# Patient Record
Sex: FEMALE | Race: WHITE | NOT HISPANIC OR LATINO | Employment: OTHER | URBAN - METROPOLITAN AREA
[De-identification: names, ages, dates, MRNs, and addresses within clinical notes are randomized per-mention and may not be internally consistent; named-entity substitution may affect disease eponyms.]

---

## 2023-10-05 ENCOUNTER — TELEPHONE (OUTPATIENT)
Dept: PRIMARY CARE | Facility: CLINIC | Age: 68
End: 2023-10-05

## 2023-10-05 NOTE — TELEPHONE ENCOUNTER
[facility] called in regards to the cert of medical necessity. States the form is missing the patient's height and weight. Form needs to be updated and refaxed. Callback number is [phone].

## 2024-06-24 ENCOUNTER — TELEPHONE (OUTPATIENT)
Dept: PRIMARY CARE | Facility: CLINIC | Age: 69
End: 2024-06-24

## 2024-06-24 NOTE — TELEPHONE ENCOUNTER
Germán is calling with [facility] and would like to know if Dr. Alvarez would be ok with them seeing this pt in between office visits with her, please advise

## 2024-07-10 ENCOUNTER — TELEPHONE (OUTPATIENT)
Dept: PRIMARY CARE | Facility: CLINIC | Age: 69
End: 2024-07-10
Payer: MEDICARE

## 2024-07-10 NOTE — TELEPHONE ENCOUNTER
Pt is calling and would like to know why Dr. Alvarez wants her to have home care, pt states that she is in rehab right now and does not have her cell phone, pt also says that it is ok to call her sister Yarelis at [phone] to explain why she needs home care, please advise

## 2024-07-10 NOTE — TELEPHONE ENCOUNTER
Call placed to patient's sister, advised PCP has not ordered homecare and that this may have been recommended from the patient's hospital admission. Pt's sister scheduled follow up for Tuesday with PCP to discuss patient being placed in assisted living.

## 2024-07-12 NOTE — PROGRESS NOTES
This is a 68 year old female for REHAB FU visit and FOR PLACEMENT in SNF evaluation:  Was at [facility] then F SOCIAL WORK sent to [facility] from [facility] to [facility] and now they are RECOMMENDING SNF    Her middle sister Zulema not here today but resides in [place] and the family wishes the PLACEMENT to be on [facility].    She also has VOCAL CORD issues likely smoking related but did not report to Dr Bradford for FU    ALSO REPORTS NEW ONSET DIARRHEA JUNE 28th which is NOT WATERY but loose NO BRBPR or MELENA        SUMMARY BELOW:      ASSESSMENT AND PLAN:   Acute resp failure hypercapneic and hypoxic   COPD exacerbation  - home regimen: Dulera, Incruse, Albuterol  HX  etoh abuse  Lung infiltrate  Severe protein calorie malnutrition     Plan  - Supplemental oxygen as needed, wean as tolerated  - continue BiPAP nightly and as needed  - continue steroids - decreased IV Solumedrol to 40 mg q 12 hrs  - continue empiric antibiotics - azithromycin day 5/5 and ceftriaxone day 5/5, last day today 6/26  - Bronchodilators -Duoneb scheduled and as needed- continue Dulera and Spiriva (for Incruse)  - mucinex and acapella  - add humidification to oxygen, ocean nasal spray to help with nasal congestion  - smoking cessation strongly encouraged, patient reports she not going to smoke anymore  - follow up outpatient with Dr. Martinez - currently scheduled 8/9/2024  - consult to smoking cessation, message sent to arrange  - encourage mobility, out of bed to chair, ambulate with therapy as able  - PT/OT recommending SNF and CM working on placement    ADMITTED TO [facility]:    Visit Diagnoses  SOB (shortness of breath)    Acute respiratory failure with hypoxia and hypercapnia (HCC)    History of COPD    Tachycardia    Tobacco use disorder      CURRENT HOSPITAL COURSE   Patient is 69 yo female who was admitted on 6/22/24 due to SOB. Patient was initially placed on BiPAP secondary acute hypercapnic and hypoxemia respiratory failure  "due to COPD exac and lung infiltrate. Labs show severe protein calorie malnutrition. Patient endorses decreased PO intake at home.     Relevant Past Medical History: COPD, smoker, lung nodule, h/o ETOH abuse, s/p L femur ORIF     HOME LIVING  Patient Lives With: Self/Alone  Assistance Available: PRN (family live nearby)  Entry To Home: No Stairs  Number Of Stairs To Bed/Bath: 0  Tub/Shower Type: tub/shower combo  Laundry: first floor  Equipment Owned: Cane, Walker- Wheeled         DISCHARGE RECOMMENDATIONS  Subacute/SNF  Recommended Discharge Disposition Comments: Patient is not at her usual functional baseline and would benefit from continued skilled PT to improve activity tolerance in standing/ambulation to allow patient to perform ADLs and household IADLs  Recommended Discharge Disposition Due to: Functional deficits requiring ongoing therapy service prior to discharge home., Anticipated community discharge, Functional status decline  Recommended Discharge Equipment: To Be Determined     ASSESSMENT  Response to Therapy Interventions: Requires Additional Time to Complete Activities, Limited Participation, Low Activity Tolerance      ROS is NEG for HEADACHE, NAUSEA, VOMITING, DIARRHEA, CHEST PAIN, SOB, and BLEEDING and as further REVIEWED BELOW.      Subjective   Ingris Mckinney is a 68 y.o. female who presents for discuss referrals and Foot Swelling (\"Purple coloring in feet since July12th\" also some \"new red veins all over body\" ).    HPI:    Per nursing intake, pt here for discuss referrals and Foot Swelling (\"Purple coloring in feet since July12th\" also some \"new red veins all over body\" )       Review of systems is essentially negative for all systems except for any identified issues in HPI above.    Objective     /60   Pulse (!) 112   Temp 36.4 °C (97.6 °F)   Wt (!) 41.3 kg (91 lb)   SpO2 96% Comment: RA  BMI 15.87 kg/m²      Physical Examination:       GENERAL           General Appearance: " well-appearing, well-developed, well-hydrated, well-nourished, no acute distress.        HEENT           NECK supple, no masses or thyromegaly, no carotid bruit.        EYES           Extraocular Movements: normal, bilateral eyes PALMA, no conjunctival injection.        HEART           Rate and Rhythm regular rate and rhythm. Heart sounds: normal S1S2, no S3 or S4. Murmurs: none.        CHEST           Breath sounds: Clear to IPPA, RR<16 no use of accessory muscles.        ABDOMEN           General: Neg for LKKS or masses, no scleral icterus or jaundice.        MUSCULOSKELETAL           Joints Demonstration: Neg for erythema, swelling or joint deformities. gross abnormalities no gross abnormalities.        EXTREMITIES           Lower Extremities: Neg for cyanosis, clubbing or edema.       Assessment/Plan   DUE TO MULTIPLE COMPLEX CONDITIONS including ACUTE ON CHRONIC RESP FAILURE / COPD/ Hx ETOH ABUSE / LUNG INFILRATE and severe protein calorie malnutrition pt is recommended to SNF per review of ADMISSION NOTES and DC from REHAB NOTES.    SHE IS INTERESTED IN RELOCATING to a PCP from [facility] on [facility] near [facility].    CASE MGT TEAM TO ASSIST with this and other referrals which she wishes to consolidate to [facility]>      SMOKING CESSATION emphasized to conitnue TOTAL AVOIDANCE of CIGARETTES which she is currently successful at doing    FU with PULM recommended and already entered in DC NOTES and also CT SCAN at [facility] AUG 9th planned and pt is aware and has all notes from REHAB regarding these plans.    PT/OT recommend and CARE MGT team working on PLACEMENT to SNF  Problem List Items Addressed This Visit    None  Visit Diagnoses       SOB (shortness of breath)    -  Primary    Relevant Orders    Referral to Pulmonology    Acute on chronic respiratory failure, unspecified whether with hypoxia or hypercapnia (Multi)        Relevant Orders    Referral to Pulmonology    Hypoxia        Relevant  Orders    Referral to Pulmonology    Hx of hypercapnia        Muscle weakness        Relevant Orders    Referral to Pulmonology    Tachycardia        Relevant Orders    Referral to Pulmonology    Hospital discharge follow-up        Relevant Orders    Referral to Pulmonology    Follow Up In [facility] - Care Manager            FOLLOW UP:  PRN and as specified above         Ngoc Alvarez M.D.

## 2024-07-16 ENCOUNTER — OFFICE VISIT (OUTPATIENT)
Dept: PRIMARY CARE | Facility: CLINIC | Age: 69
End: 2024-07-16
Payer: MEDICARE

## 2024-07-16 VITALS
HEART RATE: 112 BPM | BODY MASS INDEX: 15.87 KG/M2 | DIASTOLIC BLOOD PRESSURE: 60 MMHG | WEIGHT: 91 LBS | OXYGEN SATURATION: 96 % | SYSTOLIC BLOOD PRESSURE: 106 MMHG | TEMPERATURE: 97.6 F

## 2024-07-16 DIAGNOSIS — M62.81 MUSCLE WEAKNESS: ICD-10-CM

## 2024-07-16 DIAGNOSIS — Z87.898: ICD-10-CM

## 2024-07-16 DIAGNOSIS — Z09 HOSPITAL DISCHARGE FOLLOW-UP: ICD-10-CM

## 2024-07-16 DIAGNOSIS — R19.7 DIARRHEA, UNSPECIFIED TYPE: ICD-10-CM

## 2024-07-16 DIAGNOSIS — R23.3 ECCHYMOSES, SPONTANEOUS: ICD-10-CM

## 2024-07-16 DIAGNOSIS — J96.20 ACUTE ON CHRONIC RESPIRATORY FAILURE, UNSPECIFIED WHETHER WITH HYPOXIA OR HYPERCAPNIA (MULTI): ICD-10-CM

## 2024-07-16 DIAGNOSIS — R09.02 HYPOXIA: ICD-10-CM

## 2024-07-16 DIAGNOSIS — Q31.8 VOCAL CORD ANOMALY: ICD-10-CM

## 2024-07-16 DIAGNOSIS — Z86.69 HISTORY OF CATARACT: ICD-10-CM

## 2024-07-16 DIAGNOSIS — R06.02 SOB (SHORTNESS OF BREATH): Primary | ICD-10-CM

## 2024-07-16 DIAGNOSIS — R00.0 TACHYCARDIA: ICD-10-CM

## 2024-07-16 PROCEDURE — 99214 OFFICE O/P EST MOD 30 MIN: CPT | Performed by: FAMILY MEDICINE

## 2024-07-16 PROCEDURE — 1159F MED LIST DOCD IN RCRD: CPT | Performed by: FAMILY MEDICINE

## 2024-07-16 RX ORDER — IBUPROFEN 200 MG
1 TABLET ORAL EVERY 24 HOURS
COMMUNITY

## 2024-07-16 RX ORDER — ACETAMINOPHEN 325 MG/1
1 TABLET ORAL
COMMUNITY

## 2024-07-16 RX ORDER — FUROSEMIDE 20 MG/1
20 TABLET ORAL DAILY
COMMUNITY

## 2024-07-16 RX ORDER — PREDNISONE 10 MG/1
10 TABLET ORAL DAILY
COMMUNITY
Start: 2024-06-28 | End: 2024-07-16 | Stop reason: WASHOUT

## 2024-07-16 RX ORDER — OMEPRAZOLE 20 MG/1
20 CAPSULE, DELAYED RELEASE ORAL
COMMUNITY

## 2024-07-16 RX ORDER — ALBUTEROL SULFATE 90 UG/1
2 AEROSOL, METERED RESPIRATORY (INHALATION) EVERY 4 HOURS PRN
COMMUNITY
Start: 2024-01-08

## 2024-07-16 RX ORDER — SODIUM CHLORIDE FOR INHALATION 7 %
4 VIAL, NEBULIZER (ML) INHALATION 2 TIMES DAILY
COMMUNITY
Start: 2024-06-28

## 2024-07-16 RX ORDER — MOMETASONE FUROATE AND FORMOTEROL FUMARATE DIHYDRATE 200; 5 UG/1; UG/1
2 AEROSOL RESPIRATORY (INHALATION) 2 TIMES DAILY
COMMUNITY
Start: 2024-01-08

## 2024-07-16 RX ORDER — HYDROXYZINE PAMOATE 25 MG/1
25 CAPSULE ORAL 3 TIMES DAILY PRN
COMMUNITY

## 2024-07-16 RX ORDER — PANTOPRAZOLE SODIUM 40 MG/1
40 TABLET, DELAYED RELEASE ORAL
COMMUNITY
Start: 2024-06-29

## 2024-07-16 RX ORDER — GUAIFENESIN 600 MG/1
1200 TABLET, EXTENDED RELEASE ORAL 2 TIMES DAILY
COMMUNITY

## 2024-07-16 RX ORDER — MELATONIN 3 MG
2 CAPSULE ORAL NIGHTLY
COMMUNITY

## 2024-07-16 RX ORDER — FLUTICASONE PROPIONATE 50 MCG
2 SPRAY, SUSPENSION (ML) NASAL
COMMUNITY
Start: 2022-09-27

## 2024-07-16 ASSESSMENT — COLUMBIA-SUICIDE SEVERITY RATING SCALE - C-SSRS

## 2024-07-16 ASSESSMENT — ENCOUNTER SYMPTOMS
OCCASIONAL FEELINGS OF UNSTEADINESS: 1
DEPRESSION: 0
LOSS OF SENSATION IN FEET: 1

## 2024-07-16 ASSESSMENT — PATIENT HEALTH QUESTIONNAIRE - PHQ9
2. FEELING DOWN, DEPRESSED OR HOPELESS: NOT AT ALL
SUM OF ALL RESPONSES TO PHQ9 QUESTIONS 1 AND 2: 0
1. LITTLE INTEREST OR PLEASURE IN DOING THINGS: NOT AT ALL

## 2024-07-17 ENCOUNTER — LAB (OUTPATIENT)
Dept: LAB | Facility: LAB | Age: 69
End: 2024-07-17
Payer: MEDICARE

## 2024-07-17 PROCEDURE — 87493 C DIFF AMPLIFIED PROBE: CPT

## 2024-07-17 PROCEDURE — 87506 IADNA-DNA/RNA PROBE TQ 6-11: CPT

## 2024-07-18 LAB
C COLI+JEJ+UPSA DNA STL QL NAA+PROBE: NOT DETECTED
C DIF TOX TCDA+TCDB STL QL NAA+PROBE: NOT DETECTED
EC STX1 GENE STL QL NAA+PROBE: NOT DETECTED
EC STX2 GENE STL QL NAA+PROBE: NOT DETECTED
NOROVIRUS GI + GII RNA STL NAA+PROBE: NOT DETECTED
RV RNA STL NAA+PROBE: NOT DETECTED
SALMONELLA DNA STL QL NAA+PROBE: NOT DETECTED
SHIGELLA DNA SPEC QL NAA+PROBE: NOT DETECTED
V CHOLERAE DNA STL QL NAA+PROBE: NOT DETECTED
Y ENTEROCOL DNA STL QL NAA+PROBE: NOT DETECTED

## 2024-07-22 ENCOUNTER — TELEPHONE (OUTPATIENT)
Dept: PRIMARY CARE | Facility: CLINIC | Age: 69
End: 2024-07-22
Payer: MEDICARE

## 2024-07-22 NOTE — PROGRESS NOTES
I received referral for Ingris but she does not qualify for CCM as she doesn't have 2 qualifying diagnosis.  Is she currently at AL facility?  If so their SW would be responsible for moving her to desired location and finding new PCP.

## 2024-07-26 ENCOUNTER — TELEPHONE (OUTPATIENT)
Dept: PRIMARY CARE | Facility: CLINIC | Age: 69
End: 2024-07-26
Payer: MEDICARE

## 2024-07-26 NOTE — TELEPHONE ENCOUNTER
Ventura is calling from [facility] and would like to know If Dr. Alvarez would be willing to follow this patient and also sign orders, please advise

## 2024-07-27 ENCOUNTER — NURSING HOME VISIT (OUTPATIENT)
Dept: POST ACUTE CARE | Facility: EXTERNAL LOCATION | Age: 69
End: 2024-07-27
Payer: MEDICARE

## 2024-07-27 DIAGNOSIS — Z91.81 AT RISK FOR FALLING: ICD-10-CM

## 2024-07-27 DIAGNOSIS — J44.9 CHRONIC OBSTRUCTIVE PULMONARY DISEASE, UNSPECIFIED COPD TYPE (MULTI): Primary | ICD-10-CM

## 2024-07-27 DIAGNOSIS — J96.90 RESPIRATORY FAILURE, UNSPECIFIED CHRONICITY, UNSPECIFIED WHETHER WITH HYPOXIA OR HYPERCAPNIA (MULTI): ICD-10-CM

## 2024-07-27 DIAGNOSIS — E46 MALNUTRITION, UNSPECIFIED TYPE (MULTI): ICD-10-CM

## 2024-07-27 DIAGNOSIS — R53.1 WEAKNESS: ICD-10-CM

## 2024-07-27 PROCEDURE — 99308 SBSQ NF CARE LOW MDM 20: CPT | Performed by: INTERNAL MEDICINE

## 2024-07-27 NOTE — LETTER
Patient: Ingris Mckinney  : 1955    Encounter Date: 2024    PLACE OF SERVICE:  [facility]    This is a sick visit.    This is cross-coverage for Dr. Baer.    Subjective  Patient ID: Ingris Mckinney is a 68 y.o. female who presents for Follow-up.    Ms. Ingris Mckinney is a 68-year-old female with history of COPD with respiratory failure.  She is unable to care for herself and requires supportive care.    Review of Systems   Constitutional:  Negative for chills and fever.   Cardiovascular:  Negative for chest pain.   All other systems reviewed and are negative.    Objective  /78   Pulse 80   Temp 36.6 °C (97.9 °F)   Resp 18     Physical Exam  Vitals reviewed.   Constitutional:       General: She is not in acute distress.     Comments: This is a well-developed, well-nourished female, siting in a chair   HENT:      Right Ear: Tympanic membrane, ear canal and external ear normal.      Left Ear: Tympanic membrane, ear canal and external ear normal.   Eyes:      General: No scleral icterus.     Pupils: Pupils are equal, round, and reactive to light.   Neck:      Vascular: No carotid bruit.   Cardiovascular:      Heart sounds: Normal heart sounds, S1 normal and S2 normal. No murmur heard.     No friction rub.   Pulmonary:      Breath sounds: Decreased breath sounds (throughout) present.   Abdominal:      Palpations: There is no hepatomegaly, splenomegaly or mass.   Musculoskeletal:         General: No swelling or deformity. Normal range of motion.      Cervical back: Neck supple.      Right lower leg: No edema.      Left lower leg: No edema.   Lymphadenopathy:      Cervical: No cervical adenopathy.      Upper Body:      Right upper body: No axillary adenopathy.      Left upper body: No axillary adenopathy.      Lower Body: No right inguinal adenopathy. No left inguinal adenopathy.   Neurological:      Mental Status: She is oriented to person, place, and time.      Cranial Nerves: Cranial nerves 2-12  are intact. No cranial nerve deficit.      Sensory: No sensory deficit.      Motor: Motor function is intact. No weakness.      Gait: Gait is intact.      Deep Tendon Reflexes: Reflexes normal.   Psychiatric:         Mood and Affect: Mood normal. Mood is not anxious or depressed. Affect is not angry.         Behavior: Behavior is not agitated.         Thought Content: Thought content normal.         Judgment: Judgment normal.     LAB WORK:  Laboratory studies were reviewed.    Assessment/Plan  Problem List Items Addressed This Visit    None  Visit Diagnoses         Codes    Chronic obstructive pulmonary disease, unspecified COPD type (Multi)    -  Primary J44.9    Respiratory failure, unspecified chronicity, unspecified whether with hypoxia or hypercapnia (Multi)     J96.90    Malnutrition, unspecified type (Multi)     E46    Weakness     R53.1    At risk for falling     Z91.81        1. COPD, on bronchodilator therapy.  2. Respiratory failure.  Use oxygen as needed.  3. Malnutrition.  Encouraged to eat.  4. Weakness, on PT/OT.  5. Fall risk, on fall precautions.    Scribe Attestation  By signing my name below, IKelly Scribe attest that this documentation has been prepared under the direction and in the presence of Josee Fowler MD.     All medical record entries made by the scribe were personally dictated by me I have reviewed the chart and agree the record accurately reflects my personal performance of his history physical examination and management      Electronically Signed By: Josee Fowler MD   8/5/24 11:04 PM

## 2024-07-29 NOTE — TELEPHONE ENCOUNTER
Inés calling asking for an update to see if PCP will follow for home care. Call back number is [phone].

## 2024-08-03 VITALS
SYSTOLIC BLOOD PRESSURE: 126 MMHG | RESPIRATION RATE: 18 BRPM | TEMPERATURE: 97.9 F | HEART RATE: 80 BPM | DIASTOLIC BLOOD PRESSURE: 78 MMHG

## 2024-08-03 ASSESSMENT — ENCOUNTER SYMPTOMS
FEVER: 0
CHILLS: 0

## 2024-08-03 NOTE — PROGRESS NOTES
PLACE OF SERVICE:  [facility]    This is a sick visit.    This is cross-coverage for Dr. Baer.    Subjective   Patient ID: Ingris Mckinney is a 68 y.o. female who presents for Follow-up.    Ms. Ingris Mckinney is a 68-year-old female with history of COPD with respiratory failure.  She is unable to care for herself and requires supportive care.    Review of Systems   Constitutional:  Negative for chills and fever.   Cardiovascular:  Negative for chest pain.   All other systems reviewed and are negative.    Objective   /78   Pulse 80   Temp 36.6 °C (97.9 °F)   Resp 18     Physical Exam  Vitals reviewed.   Constitutional:       General: She is not in acute distress.     Comments: This is a well-developed, well-nourished female, siting in a chair   HENT:      Right Ear: Tympanic membrane, ear canal and external ear normal.      Left Ear: Tympanic membrane, ear canal and external ear normal.   Eyes:      General: No scleral icterus.     Pupils: Pupils are equal, round, and reactive to light.   Neck:      Vascular: No carotid bruit.   Cardiovascular:      Heart sounds: Normal heart sounds, S1 normal and S2 normal. No murmur heard.     No friction rub.   Pulmonary:      Breath sounds: Decreased breath sounds (throughout) present.   Abdominal:      Palpations: There is no hepatomegaly, splenomegaly or mass.   Musculoskeletal:         General: No swelling or deformity. Normal range of motion.      Cervical back: Neck supple.      Right lower leg: No edema.      Left lower leg: No edema.   Lymphadenopathy:      Cervical: No cervical adenopathy.      Upper Body:      Right upper body: No axillary adenopathy.      Left upper body: No axillary adenopathy.      Lower Body: No right inguinal adenopathy. No left inguinal adenopathy.   Neurological:      Mental Status: She is oriented to person, place, and time.      Cranial Nerves: Cranial nerves 2-12 are intact. No cranial nerve deficit.      Sensory: No sensory  deficit.      Motor: Motor function is intact. No weakness.      Gait: Gait is intact.      Deep Tendon Reflexes: Reflexes normal.   Psychiatric:         Mood and Affect: Mood normal. Mood is not anxious or depressed. Affect is not angry.         Behavior: Behavior is not agitated.         Thought Content: Thought content normal.         Judgment: Judgment normal.     LAB WORK:  Laboratory studies were reviewed.    Assessment/Plan   Problem List Items Addressed This Visit    None  Visit Diagnoses         Codes    Chronic obstructive pulmonary disease, unspecified COPD type (Multi)    -  Primary J44.9    Respiratory failure, unspecified chronicity, unspecified whether with hypoxia or hypercapnia (Multi)     J96.90    Malnutrition, unspecified type (Multi)     E46    Weakness     R53.1    At risk for falling     Z91.81        1. COPD, on bronchodilator therapy.  2. Respiratory failure.  Use oxygen as needed.  3. Malnutrition.  Encouraged to eat.  4. Weakness, on PT/OT.  5. Fall risk, on fall precautions.    Scribe Attestation  By signing my name below, IKelly Scribe attest that this documentation has been prepared under the direction and in the presence of Josee Fowler MD.     All medical record entries made by the scribjuliane were personally dictated by me I have reviewed the chart and agree the record accurately reflects my personal performance of his history physical examination and management

## 2024-08-06 ENCOUNTER — TELEPHONE (OUTPATIENT)
Dept: PRIMARY CARE | Facility: CLINIC | Age: 69
End: 2024-08-06
Payer: MEDICARE

## 2024-08-06 NOTE — TELEPHONE ENCOUNTER
Agustina canela nurse  from [facility] stating this pt is applying for a waiver and there will be some orders sent over that will need to be signed due to the pt being admitting in the hospital from 6/22- 6/28 and then a rehab visit after nurse is requesting that these orders be signed and sent back ASAP

## 2024-08-12 ENCOUNTER — TELEPHONE (OUTPATIENT)
Dept: PRIMARY CARE | Facility: CLINIC | Age: 69
End: 2024-08-12
Payer: MEDICARE

## 2024-08-12 NOTE — TELEPHONE ENCOUNTER
"[facility] Physical Therapy left VM stating \"pt resting HR is 107-112 bpm but pt does not seem to be in any cardiac distress or any cardiac symptoms. Any further instructions or any further information you may need just call us.\"   "

## 2024-09-23 ENCOUNTER — OFFICE VISIT (OUTPATIENT)
Dept: PRIMARY CARE | Facility: CLINIC | Age: 69
End: 2024-09-23
Payer: MEDICARE

## 2024-09-23 VITALS
WEIGHT: 93 LBS | TEMPERATURE: 97 F | HEART RATE: 97 BPM | HEIGHT: 64 IN | DIASTOLIC BLOOD PRESSURE: 80 MMHG | OXYGEN SATURATION: 98 % | SYSTOLIC BLOOD PRESSURE: 110 MMHG | BODY MASS INDEX: 15.88 KG/M2

## 2024-09-23 DIAGNOSIS — N95.9 MENOPAUSAL AND POSTMENOPAUSAL DISORDER: ICD-10-CM

## 2024-09-23 DIAGNOSIS — Z00.00 PHYSICAL EXAM: Primary | ICD-10-CM

## 2024-09-23 DIAGNOSIS — Z12.31 BREAST CANCER SCREENING BY MAMMOGRAM: ICD-10-CM

## 2024-09-23 PROBLEM — M16.9 DEGENERATIVE JOINT DISEASE (DJD) OF HIP: Status: ACTIVE | Noted: 2024-09-23

## 2024-09-23 PROBLEM — R26.89 BALANCE DISORDER: Status: ACTIVE | Noted: 2024-09-23

## 2024-09-23 PROBLEM — S32.591A CLOSED FRACTURE OF RIGHT INFERIOR PUBIC RAMUS: Status: ACTIVE | Noted: 2024-09-23

## 2024-09-23 PROBLEM — E78.5 HYPERLIPIDEMIA: Status: ACTIVE | Noted: 2024-09-23

## 2024-09-23 PROBLEM — J43.2 CENTRILOBULAR EMPHYSEMA (MULTI): Chronic | Status: ACTIVE | Noted: 2022-06-08

## 2024-09-23 PROBLEM — R09.02 HYPOXEMIA: Status: ACTIVE | Noted: 2024-09-23

## 2024-09-23 PROBLEM — F41.9 ANXIETY: Status: ACTIVE | Noted: 2024-09-23

## 2024-09-23 PROBLEM — J44.1 COPD WITH EXACERBATION (MULTI): Status: ACTIVE | Noted: 2024-06-24

## 2024-09-23 PROBLEM — S32.9XXA CLOSED FRACTURE OF PELVIS (MULTI): Status: ACTIVE | Noted: 2022-11-14

## 2024-09-23 PROBLEM — J96.22 ACUTE ON CHRONIC RESPIRATORY FAILURE WITH HYPOXIA AND HYPERCAPNIA (MULTI): Status: ACTIVE | Noted: 2024-06-22

## 2024-09-23 PROBLEM — J96.21 ACUTE ON CHRONIC RESPIRATORY FAILURE WITH HYPOXIA AND HYPERCAPNIA (MULTI): Status: ACTIVE | Noted: 2024-06-22

## 2024-09-23 PROBLEM — F32.A DEPRESSION: Status: ACTIVE | Noted: 2024-09-23

## 2024-09-23 PROBLEM — I10 ESSENTIAL HYPERTENSION: Status: ACTIVE | Noted: 2023-01-16

## 2024-09-23 PROCEDURE — 99397 PER PM REEVAL EST PAT 65+ YR: CPT | Performed by: INTERNAL MEDICINE

## 2024-09-23 PROCEDURE — 1036F TOBACCO NON-USER: CPT | Performed by: INTERNAL MEDICINE

## 2024-09-23 PROCEDURE — 90662 IIV NO PRSV INCREASED AG IM: CPT | Performed by: INTERNAL MEDICINE

## 2024-09-23 PROCEDURE — 3079F DIAST BP 80-89 MM HG: CPT | Performed by: INTERNAL MEDICINE

## 2024-09-23 PROCEDURE — 1126F AMNT PAIN NOTED NONE PRSNT: CPT | Performed by: INTERNAL MEDICINE

## 2024-09-23 PROCEDURE — 90677 PCV20 VACCINE IM: CPT | Performed by: INTERNAL MEDICINE

## 2024-09-23 PROCEDURE — 1159F MED LIST DOCD IN RCRD: CPT | Performed by: INTERNAL MEDICINE

## 2024-09-23 PROCEDURE — 3074F SYST BP LT 130 MM HG: CPT | Performed by: INTERNAL MEDICINE

## 2024-09-23 PROCEDURE — 99215 OFFICE O/P EST HI 40 MIN: CPT | Performed by: INTERNAL MEDICINE

## 2024-09-23 PROCEDURE — 1124F ACP DISCUSS-NO DSCNMKR DOCD: CPT | Performed by: INTERNAL MEDICINE

## 2024-09-23 PROCEDURE — G0439 PPPS, SUBSEQ VISIT: HCPCS | Performed by: INTERNAL MEDICINE

## 2024-09-23 PROCEDURE — 3008F BODY MASS INDEX DOCD: CPT | Performed by: INTERNAL MEDICINE

## 2024-09-23 RX ORDER — PREDNISONE 20 MG/1
20 TABLET ORAL DAILY
COMMUNITY
Start: 2024-09-18

## 2024-09-23 ASSESSMENT — PAIN SCALES - GENERAL: PAINLEVEL: 0-NO PAIN

## 2024-09-23 NOTE — PROGRESS NOTES
Outpatient Visit Note    No chief complaint on file.      HPI:  Ingris Mckinney is a 68 y.o. female here  for a Medicare Wellness Visit.    Health Maintenance    Pt stopped smoking 3 mos ago; no illicit drug use; unknown ETOH use.     Screening colonoscopy N/A. Screening pap N/A. Screening mammogram N/A. DEXA overdue.     Hearing screen: reports no difficulty with hearing    Does the patient use opioid medications: No    How does the patient rate their health status today: fair    Cognitive Screen:  AAAx3  to person, place and time: Yes  3 word recall: banana, chair, sunshine   - Immediate recall: Yes   - 5 minutes recall: Yes   Impression: No cognitive deficiency observed during screening    Reviewed:   Past Medical History/Allergies:  Yes  Family History:  Yes  Social History:  Yes  Current Medications:  Yes  Vital Signs:  Yes  Advanced Directives:  discussed  Immunizations:  reviewed today  Home Safety:                    Up & Go test > 30 seconds?  No                   Home have rugs; lack grab bars in bathroom; lack handrail on stairs; have poor lighting?  No                   Hearing difficulties?  No  Geriatric Assessment                   ADL areas requiring assistance:  Does not need help with Dressing, Eating, Ambulating, Toileting, Grooming, Hygiene.                    IADL areas requiring assistance:  Does not need help with Shopping, Housework, Accounting, Transportation, Driving.   Medications: reviewed  Current supplements:  Reviewed and recorded.   Other providers: Reviewed and recorded          Past Medical History:   Diagnosis Date    At risk for falling     COPD (chronic obstructive pulmonary disease) (Multi)     Hypoxia     Malnutrition (Multi)     Personal history of other diseases of the respiratory system     History of chronic obstructive lung disease    Respiratory failure (Multi)     Tachycardia     Weakness         Current Medications  Current Outpatient Medications   Medication  Instructions    acetaminophen (Tylenol) 325 mg tablet 1 tablet, oral, Daily RT    albuterol 90 mcg/actuation inhaler 2 puffs, inhalation, Every 4 hours PRN    Dulera 200-5 mcg/actuation inhaler 2 puffs, inhalation, 2 times daily    fluticasone (Flonase) 50 mcg/actuation nasal spray 2 sprays, Does not apply, Daily RT    furosemide (LASIX) 20 mg, oral, Daily    guaiFENesin (MUCINEX) 1,200 mg, oral, 2 times daily, Do not crush, chew, or split.    hydrOXYzine pamoate (VISTARIL) 25 mg, oral, 3 times daily PRN    L. acidophilus/pectin, citrus (ACIDOPHILUS PROBIOTIC ORAL) 2 tablets, oral, Daily    melatonin 3 mg capsule 2 tablets, oral, Nightly    nicotine (Nicoderm CQ) 14 mg/24 hr patch 1 patch, transdermal, Every 24 hours    omeprazole (PRILOSEC) 20 mg, oral, Daily before breakfast, Do not crush or chew.    pantoprazole (PROTONIX) 40 mg, oral, Daily before breakfast    predniSONE (DELTASONE) 20 mg, oral, Daily    sodium chloride 7 % solution for nebulization nebulizer solution 4 mL, inhalation, 2 times daily    sodium chloride-Aloe vera gel (Ayr Saline) gel topical gel 1 Application, nasal, Every 6 hours PRN    umeclidinium (Incruse Ellipta) 62.5 mcg/actuation inhalation 1 puff, inhalation, Daily RT        Allergies  Allergies   Allergen Reactions    Scallops Anaphylaxis    Iodinated Contrast Media Itching        Immunizations  Immunization History   Administered Date(s) Administered    Moderna SARS-CoV-2 Vaccination 02/15/2021, 03/15/2021, 11/04/2021        Past Surgical History:   Procedure Laterality Date    CT GUIDED IMAGING FOR NEEDLE PLACEMENT  5/5/2020    CT GUIDED IMAGING FOR NEEDLE PLACEMENT [facility]    OTHER SURGICAL HISTORY  11/11/2022    Hip surgery    OTHER SURGICAL HISTORY  11/11/2022    Arm surgery    OTHER SURGICAL HISTORY  11/11/2022    Leg surgery     Family History   Problem Relation Name Age of Onset    Other (chronic lung disease) Other       Social History     Tobacco Use    Smoking  status: Former     Types: Cigarettes    Smokeless tobacco: Never   Substance Use Topics    Alcohol use: Yes     Tobacco Use: Medium Risk (9/23/2024)    Patient History     Smoking Tobacco Use: Former     Smokeless Tobacco Use: Never     Passive Exposure: Not on file        ROS  All pertinent positive symptoms are included in the history of present illness.  All other systems have been reviewed and are negative and noncontributory to this patient's current ailments.    VITAL SIGNS  Vitals:    09/23/24 1013   BP: 110/80   Pulse: 97   Temp: 36.1 °C (97 °F)   SpO2: 98%     Vitals:    09/23/24 1013   Weight: (!) 42.2 kg (93 lb)      Body mass index is 16.22 kg/m².     PHYSICAL EXAM  GENERAL APPEARANCE: cachetic, looks older than stated age, in no acute distress, not ill or tired appearing, conversing well.   HEENT: no trauma, normocephalic. PERRLA and EOMI with normal external exam. TM's intact with no injection or effusion, no signs of infection. Nares patent, turbinates pink without discharge. Pharynx pink with no exudates or lesions, no enlarged tonsils.   NECK: no nodes, supple without rigidity, no neck mass was observed, no thyromegaly or thyroid nodules.   HEART: regular rate and rhythm, S1 and S2 heard with no murmurs or skipped beats, no carotid bruits.   LUNGS: clear to auscultation bilaterally with no wheezes, crackles or rales.   ABDOMEN: no organomegaly, soft, nontender, nondistended, normal bowel sounds, no guarding/rebound/rigidity.   EXTREMITIES: moving all extremities equally with no edema or deformities.   SKIN: normal skin color and pigmentation, normal skin turgor without rash, lesions, or nodules visualized.   NEUROLOGIC EXAM: CN II-XII grossly intact, normal gait, normal balance, 5/5 muscle strength, sensation grossly intact.   PSYCH: mood and affect appropriate; alert and oriented to time, place, person; no difficulty with speech or language.   LYMPH NODES: no cervical lymphadenopathy.          Assessment/Plan   Diagnoses and all orders for this visit:  Physical exam  -     Lipid panel; Future  Breast cancer screening by mammogram  -     BI mammo bilateral screening tomosynthesis; Future  Menopausal and postmenopausal disorder  -     XR DEXA bone density; Future  Other orders  -     Flu vaccine, trivalent, preservative free, HIGH-DOSE, age 65y+ (Fluzone)  -     Pneumococcal conjugate vaccine, 20-valent (PREVNAR 20)      This was a shared decision making visit.    Next Wellness Exam Due  In 1 year from today      Marge Camacho MD   09/23/24   10:19 AM

## 2024-09-25 ENCOUNTER — TELEPHONE (OUTPATIENT)
Dept: PRIMARY CARE | Facility: CLINIC | Age: 69
End: 2024-09-25
Payer: MEDICARE

## 2024-10-01 ENCOUNTER — APPOINTMENT (OUTPATIENT)
Dept: PRIMARY CARE | Facility: CLINIC | Age: 69
End: 2024-10-01
Payer: MEDICARE

## 2024-10-03 ENCOUNTER — LAB (OUTPATIENT)
Dept: LAB | Facility: LAB | Age: 69
End: 2024-10-03
Payer: MEDICARE

## 2024-10-03 ENCOUNTER — HOSPITAL ENCOUNTER (OUTPATIENT)
Dept: RADIOLOGY | Facility: HOSPITAL | Age: 69
Discharge: HOME | End: 2024-10-03
Payer: MEDICARE

## 2024-10-03 DIAGNOSIS — Z00.00 PHYSICAL EXAM: ICD-10-CM

## 2024-10-03 DIAGNOSIS — E78.2 MIXED HYPERLIPIDEMIA: Primary | ICD-10-CM

## 2024-10-03 DIAGNOSIS — N95.9 MENOPAUSAL AND POSTMENOPAUSAL DISORDER: ICD-10-CM

## 2024-10-03 LAB
CHOLEST SERPL-MCNC: 272 MG/DL (ref 0–199)
CHOLEST/HDLC SERPL: 4 {RATIO}
HDLC SERPL-MCNC: 68.8 MG/DL
LDLC SERPL CALC-MCNC: 172 MG/DL
NON HDL CHOLESTEROL: 203 MG/DL (ref 0–149)
TRIGL SERPL-MCNC: 156 MG/DL (ref 0–149)
VLDL: 31 MG/DL (ref 0–40)

## 2024-10-03 PROCEDURE — 77080 DXA BONE DENSITY AXIAL: CPT | Performed by: RADIOLOGY

## 2024-10-03 PROCEDURE — 36415 COLL VENOUS BLD VENIPUNCTURE: CPT

## 2024-10-03 PROCEDURE — 77080 DXA BONE DENSITY AXIAL: CPT

## 2024-10-04 RX ORDER — ROSUVASTATIN CALCIUM 10 MG/1
10 TABLET, COATED ORAL DAILY
Qty: 90 TABLET | Refills: 0 | Status: SHIPPED | OUTPATIENT
Start: 2024-10-04 | End: 2025-11-08

## 2024-10-18 ENCOUNTER — HOSPITAL ENCOUNTER (OUTPATIENT)
Dept: RADIOLOGY | Facility: HOSPITAL | Age: 69
Discharge: HOME | End: 2024-10-18
Payer: MEDICARE

## 2024-10-18 VITALS — WEIGHT: 93 LBS | BODY MASS INDEX: 15.88 KG/M2 | HEIGHT: 64 IN

## 2024-10-18 DIAGNOSIS — Z12.31 BREAST CANCER SCREENING BY MAMMOGRAM: ICD-10-CM

## 2024-10-18 PROCEDURE — 77067 SCR MAMMO BI INCL CAD: CPT

## 2024-12-09 ENCOUNTER — APPOINTMENT (OUTPATIENT)
Dept: OTOLARYNGOLOGY | Facility: CLINIC | Age: 69
End: 2024-12-09
Payer: MEDICARE

## 2024-12-09 VITALS — WEIGHT: 8 LBS | BODY MASS INDEX: 1.37 KG/M2 | HEIGHT: 64 IN | TEMPERATURE: 97.1 F

## 2024-12-09 DIAGNOSIS — R49.0 HOARSENESS: Primary | ICD-10-CM

## 2024-12-09 DIAGNOSIS — J38.01 UNILATERAL COMPLETE PARALYSIS OF VOCAL CORD: ICD-10-CM

## 2024-12-09 DIAGNOSIS — J38.7 LESION OF LARYNX: ICD-10-CM

## 2024-12-09 PROCEDURE — 1160F RVW MEDS BY RX/DR IN RCRD: CPT | Performed by: OTOLARYNGOLOGY

## 2024-12-09 PROCEDURE — 99214 OFFICE O/P EST MOD 30 MIN: CPT | Performed by: OTOLARYNGOLOGY

## 2024-12-09 PROCEDURE — 31575 DIAGNOSTIC LARYNGOSCOPY: CPT | Performed by: OTOLARYNGOLOGY

## 2024-12-09 PROCEDURE — 3008F BODY MASS INDEX DOCD: CPT | Performed by: OTOLARYNGOLOGY

## 2024-12-09 PROCEDURE — 1159F MED LIST DOCD IN RCRD: CPT | Performed by: OTOLARYNGOLOGY

## 2024-12-09 PROCEDURE — 1036F TOBACCO NON-USER: CPT | Performed by: OTOLARYNGOLOGY

## 2024-12-09 NOTE — PROGRESS NOTES
HPI  Ingris Mckinney is a 69 y.o. female history of smoking and vocal cord paralysis diagnosed November 2022.Since CT scan was without irregularity at that time.  She has been followed for some subcentimeter lung nodules.  She underwent injection medialization January 16, 2023 and had good result from this.  She presented for a couple follow-ups and seem to have some spontaneous improvement of the vocal cord mobility.  She has been lost to follow-up since and was last seen May 2023.  12 days ago she experienced acute decline of her voice and presents for evaluation of this.  She had been ceasing her smoking and then resuming.  She has been having a little bit of coughing but seems to be swallowing well.  No hemoptysis.  She has some intermittent left throat pain but not now      Past Medical History:   Diagnosis Date    At risk for falling     COPD (chronic obstructive pulmonary disease) (Multi)     Hypoxia     Malnutrition (Multi)     Personal history of other diseases of the respiratory system     History of chronic obstructive lung disease    Respiratory failure (Multi)     Tachycardia     Weakness             Medications:     Current Outpatient Medications:     acetaminophen (Tylenol) 325 mg tablet, Take 1 tablet (325 mg) by mouth once daily., Disp: , Rfl:     albuterol 90 mcg/actuation inhaler, Inhale 2 puffs every 4 hours if needed., Disp: , Rfl:     alendronate (Fosamax) 70 mg tablet, Take 1 tablet (70 mg) by mouth every 7 days. Take in the morning with a full glass of water, on an empty stomach, and do not take anything else by mouth or lie down for the next 30 min., Disp: 12 tablet, Rfl: 3    Dulera 200-5 mcg/actuation inhaler, Inhale 2 puffs twice a day., Disp: , Rfl:     furosemide (Lasix) 20 mg tablet, Take 1 tablet (20 mg) by mouth once daily., Disp: , Rfl:     hydrOXYzine pamoate (Vistaril) 25 mg capsule, Take 1 capsule (25 mg) by mouth 3 times a day as needed for itching., Disp: , Rfl:     melatonin 3 mg  "capsule, Take 2 tablets by mouth once daily at bedtime., Disp: , Rfl:     nicotine (Nicoderm CQ) 14 mg/24 hr patch, Place 1 patch on the skin once every 24 hours., Disp: , Rfl:     omeprazole (PriLOSEC) 20 mg DR capsule, Take 1 capsule (20 mg) by mouth once daily in the morning. Take before meals. Do not crush or chew., Disp: , Rfl:     rosuvastatin (Crestor) 10 mg tablet, Take 1 tablet (10 mg) by mouth once daily., Disp: 90 tablet, Rfl: 0    sodium chloride 7 % solution for nebulization nebulizer solution, Inhale 4 mL twice a day., Disp: , Rfl:     sodium chloride-Aloe vera gel (Ayr Saline) gel topical gel, Apply 1 Application to affected nostril(s) every 6 hours if needed., Disp: , Rfl:     umeclidinium (Incruse Ellipta) 62.5 mcg/actuation inhalation, Inhale 1 puff (62.5 mcg) once daily., Disp: , Rfl:     fluticasone (Flonase) 50 mcg/actuation nasal spray, 2 sprays by Does not apply route once daily. (Patient not taking: Reported on 12/9/2024), Disp: , Rfl:     guaiFENesin (Mucinex) 600 mg 12 hr tablet, Take 2 tablets (1,200 mg) by mouth 2 times a day. Do not crush, chew, or split. (Patient not taking: Reported on 12/9/2024), Disp: , Rfl:     L. acidophilus/pectin, citrus (ACIDOPHILUS PROBIOTIC ORAL), Take 2 tablets by mouth once daily. (Patient not taking: Reported on 12/9/2024), Disp: , Rfl:     pantoprazole (ProtoNix) 40 mg EC tablet, Take 1 tablet (40 mg) by mouth once daily in the morning. Take before meals. (Patient not taking: Reported on 12/9/2024), Disp: , Rfl:     predniSONE (Deltasone) 20 mg tablet, Take 1 tablet (20 mg) by mouth once daily., Disp: , Rfl:      Allergies:  Allergies   Allergen Reactions    Scallops Anaphylaxis    Iodinated Contrast Media Itching        Physical Exam:  Last Recorded Vitals  Temperature 36.2 °C (97.1 °F), height 1.613 m (5' 3.5\"), weight (!) 3.629 kg (8 lb).  General:     General appearance: Well-developed, well-nourished in no acute distress.       Voice:  normal       " Head/face: Normal appearance; nontender to palpation     Facial nerve function: Normal and symmetric bilaterally.    Oral/oropharynx:     Oral vestibule: Normal labial and gingival mucosa     Tongue/floor of mouth: Normal without lesion     Oropharynx: Clear.  No lesions present of the hard/soft palate, posterior pharynx    Neck:     Neck: Normal appearance, trachea midline     Salivary glands: Normal to palpation bilaterally     Lymph nodes: No cervical lymphadenopathy to palpation     Thyroid: No thyromegaly.  No palpable nodules     Range of motion: Normal    Neurological:     Cortical functions: Alert and oriented x3, appropriate affect       Larynx/hypopharynx:     Laryngeal findings: Mirror exam inadequate or limited secondary to enlarged base of tongue and/or excessive gagging.  Flexible laryngoscopy performed secondary to inadequate mirror examination.  Verbal informed consent the flexible laryngoscope was passed through the nasal cavity.  Normal epiglottis, aryepiglottic folds, arytenoids, false vocal cords, true vocal cords.  She has normal vocal cord mobility on the right.  Left side fixed in the paramedian position.  Mildly bowed.  Mildly shortened.  She has mucosal irregularity of the anterior left false vocal cord that is new  Nasopharynx:     Nasopharynx: Normal    Ear:     Ear canal: Normal bilaterally     Tympanic membrane: Intact and mobile bilaterally     Pinna: Normal bilaterally     Hearing:  Gross hearing assessment normal by voice    Nose:     Visualized using: Anterior rhinoscopy     Nasopharynx: Inadequate mirror exam secondary to gag, anatomy.       Nasal dorsum: Nontraumatic midline appearance     Septum: Midline     Inferior turbinates: Normally sized     Mucosa: Bilateral, pink, normal appearing       ASSESSMENT/PLAN:  Left vocal cord paralysis.  Concerning finding on flexible laryngoscopy today for the possibility of neoplasm.  Recommend proceed to the operating room for direct  laryngoscopy and biopsy with further plan to follow.  Risks of anesthesia, bleeding, failure to diagnose were discussed.  Consent indicated and this will be scheduled        Carlito Bradford MD

## 2024-12-18 ENCOUNTER — PREP FOR PROCEDURE (OUTPATIENT)
Dept: OTOLARYNGOLOGY | Facility: HOSPITAL | Age: 69
End: 2024-12-18
Payer: MEDICARE

## 2024-12-18 DIAGNOSIS — J38.7 LESION OF LARYNX: Primary | ICD-10-CM

## 2024-12-19 PROBLEM — J38.7 LESION OF LARYNX: Status: ACTIVE | Noted: 2024-12-18

## 2024-12-30 ENCOUNTER — PRE-ADMISSION TESTING (OUTPATIENT)
Dept: PREADMISSION TESTING | Facility: HOSPITAL | Age: 69
End: 2024-12-30
Payer: MEDICARE

## 2024-12-30 ENCOUNTER — LAB (OUTPATIENT)
Dept: LAB | Facility: LAB | Age: 69
End: 2024-12-30
Payer: MEDICARE

## 2024-12-30 VITALS
WEIGHT: 86.4 LBS | OXYGEN SATURATION: 96 % | SYSTOLIC BLOOD PRESSURE: 108 MMHG | TEMPERATURE: 97.2 F | HEIGHT: 63 IN | HEART RATE: 108 BPM | BODY MASS INDEX: 15.31 KG/M2 | RESPIRATION RATE: 16 BRPM | DIASTOLIC BLOOD PRESSURE: 73 MMHG

## 2024-12-30 DIAGNOSIS — Z01.818 PREOPERATIVE TESTING: ICD-10-CM

## 2024-12-30 DIAGNOSIS — Z01.818 PREOPERATIVE TESTING: Primary | ICD-10-CM

## 2024-12-30 LAB
ANION GAP SERPL CALCULATED.3IONS-SCNC: 12 MMOL/L (ref 10–20)
BUN SERPL-MCNC: 9 MG/DL (ref 6–23)
CALCIUM SERPL-MCNC: 8.8 MG/DL (ref 8.6–10.3)
CHLORIDE SERPL-SCNC: 102 MMOL/L (ref 98–107)
CO2 SERPL-SCNC: 30 MMOL/L (ref 21–32)
CREAT SERPL-MCNC: 0.44 MG/DL (ref 0.5–1.05)
EGFRCR SERPLBLD CKD-EPI 2021: >90 ML/MIN/1.73M*2
ERYTHROCYTE [DISTWIDTH] IN BLOOD BY AUTOMATED COUNT: 15 % (ref 11.5–14.5)
GLUCOSE SERPL-MCNC: 111 MG/DL (ref 74–99)
HCT VFR BLD AUTO: 41.4 % (ref 36–46)
HGB BLD-MCNC: 13.3 G/DL (ref 12–16)
MCH RBC QN AUTO: 30.1 PG (ref 26–34)
MCHC RBC AUTO-ENTMCNC: 32.1 G/DL (ref 32–36)
MCV RBC AUTO: 94 FL (ref 80–100)
NRBC BLD-RTO: 0 /100 WBCS (ref 0–0)
PLATELET # BLD AUTO: 242 X10*3/UL (ref 150–450)
POTASSIUM SERPL-SCNC: 3.8 MMOL/L (ref 3.5–5.3)
RBC # BLD AUTO: 4.42 X10*6/UL (ref 4–5.2)
SODIUM SERPL-SCNC: 140 MMOL/L (ref 136–145)
WBC # BLD AUTO: 10 X10*3/UL (ref 4.4–11.3)

## 2024-12-30 PROCEDURE — 80048 BASIC METABOLIC PNL TOTAL CA: CPT

## 2024-12-30 PROCEDURE — 85027 COMPLETE CBC AUTOMATED: CPT

## 2024-12-30 PROCEDURE — 99204 OFFICE O/P NEW MOD 45 MIN: CPT | Performed by: NURSE PRACTITIONER

## 2024-12-30 RX ORDER — ALBUTEROL SULFATE 0.83 MG/ML
2.5 SOLUTION RESPIRATORY (INHALATION) EVERY 4 HOURS PRN
COMMUNITY

## 2024-12-30 ASSESSMENT — DUKE ACTIVITY SCORE INDEX (DASI)
CAN YOU DO HEAVY WORK AROUND THE HOUSE LIKE SCRUBBING FLOORS OR LIFTING AND MOVING HEAVY FURNITURE: NO
CAN YOU PARTICIPATE IN STRENOUS SPORTS LIKE SWIMMING, SINGLES TENNIS, FOOTBALL, BASKETBALL, OR SKIING: NO
CAN YOU CLIMB A FLIGHT OF STAIRS OR WALK UP A HILL: YES
CAN YOU DO MODERATE WORK AROUND THE HOUSE LIKE VACUUMING, SWEEPING FLOORS OR CARRYING GROCERIES: YES
CAN YOU WALK INDOORS, SUCH AS AROUND YOUR HOUSE: YES
CAN YOU TAKE CARE OF YOURSELF (EAT, DRESS, BATHE, OR USE TOILET): YES
DASI METS SCORE: 5.4
CAN YOU PARTICIPATE IN MODERATE RECREATIONAL ACTIVITIES LIKE GOLF, BOWLING, DANCING, DOUBLES TENNIS OR THROWING A BASEBALL OR FOOTBALL: NO
CAN YOU DO LIGHT WORK AROUND THE HOUSE LIKE DUSTING OR WASHING DISHES: YES
CAN YOU HAVE SEXUAL RELATIONS: YES
CAN YOU DO YARD WORK LIKE RAKING LEAVES, WEEDING OR PUSHING A MOWER: NO
CAN YOU WALK A BLOCK OR TWO ON LEVEL GROUND: NO
CAN YOU RUN A SHORT DISTANCE: NO
TOTAL_SCORE: 21.45

## 2024-12-30 ASSESSMENT — ENCOUNTER SYMPTOMS
CARDIOVASCULAR NEGATIVE: 1
PSYCHIATRIC NEGATIVE: 1
EYES NEGATIVE: 1
GASTROINTESTINAL NEGATIVE: 1
SHORTNESS OF BREATH: 1
ACTIVITY CHANGE: 1

## 2024-12-30 ASSESSMENT — PAIN - FUNCTIONAL ASSESSMENT: PAIN_FUNCTIONAL_ASSESSMENT: 0-10

## 2024-12-30 ASSESSMENT — PAIN SCALES - GENERAL: PAINLEVEL_OUTOF10: 0 - NO PAIN

## 2024-12-30 NOTE — PREPROCEDURE INSTRUCTIONS
Medication List            Accurate as of December 30, 2024  4:08 PM. Always use your most recent med list.                acetaminophen 325 mg tablet  Commonly known as: Tylenol  Medication Adjustments for Surgery: Take/Use as prescribed     * albuterol 2.5 mg /3 mL (0.083 %) nebulizer solution  Medication Adjustments for Surgery: Take/Use as prescribed     * albuterol 90 mcg/actuation inhaler  Medication Adjustments for Surgery: Take/Use as prescribed  Notes to patient: Bring inhaler the day of surgery     alendronate 70 mg tablet  Commonly known as: Fosamax  Take 1 tablet (70 mg) by mouth every 7 days. Take in the morning with a full glass of water, on an empty stomach, and do not take anything else by mouth or lie down for the next 30 min.  Medication Adjustments for Surgery: Do Not take on the morning of surgery  Notes to patient: Takes on saturday     Dulera 200-5 mcg/actuation inhaler  Generic drug: mometasone-formoterol  Medication Adjustments for Surgery: Take on the morning of surgery     rosuvastatin 10 mg tablet  Commonly known as: Crestor  Take 1 tablet (10 mg) by mouth once daily.  Medication Adjustments for Surgery: Take on the morning of surgery     umeclidinium 62.5 mcg/actuation inhalation  Commonly known as: Incruse Ellipta  Medication Adjustments for Surgery: Take on the morning of surgery           * This list has 2 medication(s) that are the same as other medications prescribed for you. Read the directions carefully, and ask your doctor or other care provider to review them with you.                      Why must I stop eating and drinking near surgery time?  With sedation, food or liquid in your stomach can enter your lungs causing serious complications  Increases nausea and vomiting    When do I need to stop eating and drinking before my surgery?   Do not eat or drink after midnight the night before your surgery/procedure.  You may have small sips of water to take your medication.    PAT  DISCHARGE INSTRUCTIONS    Please call the Same Day Surgery (SDS) Department of the hospital where your procedure will be performed after 2:00 PM the day before your surgery. If you are scheduled on a Monday, or a Tuesday following a Monday holiday, you will need to call on the last business day prior to your surgery.    [facility]  [address] [place] [address] [phone]  [facility]  [address] [place] [address], [id number] [phone]  [facility]  [address] [facility] [place] [address]  [phone]    Please let your surgeon know if:      You develop any open sores, shingles, burning or painful urination as these may increase your risk of an infection.   You no longer wish to have the surgery.   Any other personal circumstances change that may lead to the need to cancel or defer this surgery-such as being sick or getting admitted to any hospital within one week of your planned procedure.    Your contact details change, such as a change of address or phone number.    Starting now:     Please DO NOT drink alcohol or smoke for 24 hours before surgery. It is well known that quitting smoking can make a huge difference to your health and recovery from surgery. The longer you abstain from smoking, the better your chances of a healthy recovery. If you need help with quitting, call 2-800-QUIT-NOW to be connected to a trained counselor who will discuss the best methods to help you quit.     Before your surgery:    Please stop all supplements 7 days prior to surgery. Or as directed by your surgeon.   Please stop taking NSAID pain medicine such as Advil and Motrin 7 days before surgery.    If you develop any fever, cough, cold, rashes, cuts, scratches, scrapes, urinary symptoms or infection anywhere on your body (including teeth and gums) prior to surgery, please call your  surgeon’s office as soon as possible. This may require treatment to reduce the chance of cancellation on the day of surgery.    The day before your surgery:   DIET- Please follow the diet instructions at the top of your packet.   Get a good night’s rest.  Use the special soap for bathing if you have been instructed to use one.    Scheduled surgery times may change and you will be notified if this occurs - please check your personal voicemail for any updates.     On the morning of surgery:   Wear comfortable, loose fitting clothes which open in the front. Please do not wear moisturizers, creams, lotions, makeup or perfume.    Please bring with you to surgery:   Photo ID and insurance card   Current list of medicines and allergies   Pacemaker/ Defibrillator/Heart stent cards   CPAP machine and mask    Slings/ splints/ crutches   A copy of your complete advanced directive/DHPOA.    Please do NOT bring with you to surgery:   All jewelry and valuables should be left at home.   Prosthetic devices such as contact lenses, hearing aids, dentures, eyelash extensions, hairpins and body piercings must be removed prior to going in to the surgical suite.    After outpatient surgery:   A responsible adult MUST accompany you at the time of discharge and stay with you for 24 hours after your surgery. You may NOT drive yourself home after surgery.    Do not drive, operate machinery, make critical decisions or do activities that require co-ordination or balance until after a night’s sleep.   Do not drink alcoholic beverages for 24 hours.   Instructions for resuming your medications will be provided by your surgeon.    CALL YOUR DOCTOR AFTER SURGERY IF YOU HAVE:     Chills and/or a fever of 101° F or higher.    Redness, swelling, pus or drainage from your surgical wound or a bad smell from the wound.    Lightheadedness, fainting or confusion.    Persistent vomiting (throwing up) and are not able to eat or drink for 12 hours.    Three  or more loose, watery bowel movements in 24 hours (diarrhea).   Difficulty or pain while urinating( after non-urological surgery)    Pain and swelling in your legs, especially if it is only on one side.    Difficulty breathing or are breathing faster than normal.    Any new concerning symptoms.

## 2024-12-30 NOTE — CPM/PAT H&P
CPM/PAT Evaluation       Name: Ingris Mckinney (Ingris Mckinney)  /Age: 1955/69 y.o.     In-Person       Chief Complaint: Lesion of larynx     HPI  A 77-year-old ill appearing female with lesion of Larynx.  History of COPD, smoking -currently uses O2 2 L nasal cannula as needed.  She had vocal cord paralysis diagnosed 2022 and progressive voice hoarseness status post injection with positive result 2023.  She has had worsening voice hoarseness over the past few weeks as well as mild cough and intermittent left throat pain.  Denies fever, chills, dysphagia, nausea, vomiting, chest pain, or syncope.  He is scheduled for direct laryngoscopy.       Past Medical History:   Diagnosis Date    At risk for falling     COPD (chronic obstructive pulmonary disease) (Multi)     Hypoxia     Malnutrition (Multi)     Personal history of other diseases of the respiratory system     History of chronic obstructive lung disease    Respiratory failure (Multi)     Tachycardia     Weakness        Past Surgical History:   Procedure Laterality Date    CT GUIDED IMAGING FOR NEEDLE PLACEMENT  2020    CT GUIDED IMAGING FOR NEEDLE PLACEMENT [facility]    OTHER SURGICAL HISTORY  2022    Hip surgery    OTHER SURGICAL HISTORY  2022    Arm surgery    OTHER SURGICAL HISTORY  2022    Leg surgery         Allergies   Allergen Reactions    Scallops Anaphylaxis    Iodinated Contrast Media Itching       Current Outpatient Medications   Medication Sig Dispense Refill    albuterol 2.5 mg /3 mL (0.083 %) nebulizer solution Take 3 mL (2.5 mg) by nebulization every 4 hours if needed for wheezing.      albuterol 90 mcg/actuation inhaler Inhale 2 puffs every 4 hours if needed.      alendronate (Fosamax) 70 mg tablet Take 1 tablet (70 mg) by mouth every 7 days. Take in the morning with a full glass of water, on an empty stomach, and do not take anything else by mouth or lie down for the next 30 min. (Patient taking differently:  Take 1 tablet (70 mg) by mouth every 7 days. Saturday) 12 tablet 3    Dulera 200-5 mcg/actuation inhaler Inhale 2 puffs twice a day.      rosuvastatin (Crestor) 10 mg tablet Take 1 tablet (10 mg) by mouth once daily. 90 tablet 0    umeclidinium (Incruse Ellipta) 62.5 mcg/actuation inhalation Inhale 1 puff (62.5 mcg) once daily.      acetaminophen (Tylenol) 325 mg tablet Take 1 tablet (325 mg) by mouth once daily. (Patient not taking: Reported on 12/30/2024)       No current facility-administered medications for this visit.       Review of Systems   Constitutional:  Positive for activity change.   HENT: Negative.     Eyes: Negative.         Glasses   Respiratory:  Positive for shortness of breath (chronic).         COPD, smoker, uses O2 2 L nasal cannula as needed   Cardiovascular: Negative.    Gastrointestinal: Negative.    Genitourinary: Negative.    Musculoskeletal:  Positive for gait problem (uses a cane).   Skin: Negative.    Psychiatric/Behavioral: Negative.          Physical Exam  Vitals reviewed.   Constitutional:       Appearance: She is ill-appearing.   HENT:      Head: Normocephalic and atraumatic.      Mouth/Throat:      Mouth: Mucous membranes are moist.      Comments: Edentulous, hoarse voice  Eyes:      Pupils: Pupils are equal, round, and reactive to light.      Comments: Glasses   Cardiovascular:      Rate and Rhythm: Normal rate and regular rhythm.      Heart sounds: Murmur (Soft) heard.   Pulmonary:      Effort: Pulmonary effort is normal.      Comments: Bilateral lung sounds diminished throughout  Abdominal:      Palpations: Abdomen is soft.   Musculoskeletal:         General: Normal range of motion.      Cervical back: Normal range of motion.      Comments: Hunched gait uses a cane   Skin:     General: Skin is warm and dry.   Neurological:      Mental Status: She is alert and oriented to person, place, and time.   Psychiatric:         Mood and Affect: Mood normal.          PAT AIRWAY:   Airway:  "    Mallampati::  II    Neck ROM::  Full   Edentulous      /73   Pulse 108   Temp 36.2 °C (97.2 °F) (Temporal)   Resp 16   Ht 1.6 m (5' 3\")   Wt (!) 39.2 kg (86 lb 6.4 oz)   SpO2 96%   BMI 15.31 kg/m²       Stop Bang Score 1   CHADS: 2.8%  DASI risk score: 21.45  METS: 5.4  AYE: 1.2%  RCRI: 0.4%  ASA: III  ARISCAT:1.6% score 3  EKG done 12/17/2024  Pulmonology last visit 12/24/2024  Aortic valve stenosis/heart murmur recent cardiology visit patient to follow-up in 8 months  PAT orders CBC, BMP      Assessment and Plan:     Lesion of larynx Plan: Direct laryngoscopy  COPD, pulmonary nodule follows with pulmonology Dr. Juan KAPADIA last visit 12/24/2024 takes Dulera, Incruse Ellipta, albuterol inhaler as needed-uses O2 2 L nasal cannula as needed  Aortic valve stenosis, heart murmur follows with cardiology Dr. Valadez last visit 12/17/2024-next follow-up 8 months  Hypertension no medications currently  Anxiety/depression  Hypercholesterolemia takes rosuvastatin  Osteoporosis managed with Fosamax  Per record past EtOH use  Balance disorder uses a cane  Smoker cigarettes 1 pack/day x 40 years-has cut down to a few cigarettes  BMI 15.31        "

## 2025-01-06 DIAGNOSIS — E78.2 MIXED HYPERLIPIDEMIA: ICD-10-CM

## 2025-01-06 RX ORDER — ROSUVASTATIN CALCIUM 10 MG/1
10 TABLET, COATED ORAL DAILY
Qty: 90 TABLET | Refills: 2 | Status: SHIPPED | OUTPATIENT
Start: 2025-01-06 | End: 2026-02-10

## 2025-01-13 ENCOUNTER — ANESTHESIA EVENT (OUTPATIENT)
Dept: OPERATING ROOM | Facility: HOSPITAL | Age: 70
End: 2025-01-13
Payer: MEDICARE

## 2025-01-13 ENCOUNTER — HOSPITAL ENCOUNTER (OUTPATIENT)
Facility: HOSPITAL | Age: 70
Setting detail: OUTPATIENT SURGERY
Discharge: HOME | End: 2025-01-13
Attending: OTOLARYNGOLOGY | Admitting: OTOLARYNGOLOGY
Payer: MEDICARE

## 2025-01-13 ENCOUNTER — ANESTHESIA (OUTPATIENT)
Dept: OPERATING ROOM | Facility: HOSPITAL | Age: 70
End: 2025-01-13
Payer: MEDICARE

## 2025-01-13 VITALS
WEIGHT: 86 LBS | RESPIRATION RATE: 14 BRPM | SYSTOLIC BLOOD PRESSURE: 106 MMHG | HEART RATE: 82 BPM | TEMPERATURE: 97.7 F | OXYGEN SATURATION: 94 % | DIASTOLIC BLOOD PRESSURE: 58 MMHG | HEIGHT: 63 IN | BODY MASS INDEX: 15.24 KG/M2

## 2025-01-13 DIAGNOSIS — J38.7 LESION OF LARYNX: ICD-10-CM

## 2025-01-13 PROCEDURE — A31535 PR LARYNGOSCOPY,DIRCT,OP,BIOPSY

## 2025-01-13 PROCEDURE — 2500000002 HC RX 250 W HCPCS SELF ADMINISTERED DRUGS (ALT 637 FOR MEDICARE OP, ALT 636 FOR OP/ED): Performed by: ANESTHESIOLOGY

## 2025-01-13 PROCEDURE — 3600000003 HC OR TIME - INITIAL BASE CHARGE - PROCEDURE LEVEL THREE: Performed by: OTOLARYNGOLOGY

## 2025-01-13 PROCEDURE — 3700000001 HC GENERAL ANESTHESIA TIME - INITIAL BASE CHARGE: Performed by: OTOLARYNGOLOGY

## 2025-01-13 PROCEDURE — 7100000010 HC PHASE TWO TIME - EACH INCREMENTAL 1 MINUTE: Performed by: OTOLARYNGOLOGY

## 2025-01-13 PROCEDURE — 88305 TISSUE EXAM BY PATHOLOGIST: CPT | Performed by: STUDENT IN AN ORGANIZED HEALTH CARE EDUCATION/TRAINING PROGRAM

## 2025-01-13 PROCEDURE — 31535 LARYNGOSCOPY W/BIOPSY: CPT | Performed by: OTOLARYNGOLOGY

## 2025-01-13 PROCEDURE — 7100000001 HC RECOVERY ROOM TIME - INITIAL BASE CHARGE: Performed by: OTOLARYNGOLOGY

## 2025-01-13 PROCEDURE — 88305 TISSUE EXAM BY PATHOLOGIST: CPT | Mod: TC,TRILAB | Performed by: OTOLARYNGOLOGY

## 2025-01-13 PROCEDURE — 88332 PATH CONSLTJ SURG EA ADD BLK: CPT | Performed by: STUDENT IN AN ORGANIZED HEALTH CARE EDUCATION/TRAINING PROGRAM

## 2025-01-13 PROCEDURE — 2500000004 HC RX 250 GENERAL PHARMACY W/ HCPCS (ALT 636 FOR OP/ED): Performed by: OTOLARYNGOLOGY

## 2025-01-13 PROCEDURE — 88331 PATH CONSLTJ SURG 1 BLK 1SPC: CPT | Performed by: STUDENT IN AN ORGANIZED HEALTH CARE EDUCATION/TRAINING PROGRAM

## 2025-01-13 PROCEDURE — 7100000002 HC RECOVERY ROOM TIME - EACH INCREMENTAL 1 MINUTE: Performed by: OTOLARYNGOLOGY

## 2025-01-13 PROCEDURE — 2500000004 HC RX 250 GENERAL PHARMACY W/ HCPCS (ALT 636 FOR OP/ED)

## 2025-01-13 PROCEDURE — A31535 PR LARYNGOSCOPY,DIRCT,OP,BIOPSY: Performed by: ANESTHESIOLOGY

## 2025-01-13 PROCEDURE — 7100000009 HC PHASE TWO TIME - INITIAL BASE CHARGE: Performed by: OTOLARYNGOLOGY

## 2025-01-13 PROCEDURE — 2720000007 HC OR 272 NO HCPCS: Performed by: OTOLARYNGOLOGY

## 2025-01-13 PROCEDURE — 96372 THER/PROPH/DIAG INJ SC/IM: CPT | Performed by: OTOLARYNGOLOGY

## 2025-01-13 PROCEDURE — 88332 PATH CONSLTJ SURG EA ADD BLK: CPT | Mod: TC,SUR,TRILAB | Performed by: OTOLARYNGOLOGY

## 2025-01-13 PROCEDURE — 3700000002 HC GENERAL ANESTHESIA TIME - EACH INCREMENTAL 1 MINUTE: Performed by: OTOLARYNGOLOGY

## 2025-01-13 PROCEDURE — 2500000005 HC RX 250 GENERAL PHARMACY W/O HCPCS: Performed by: OTOLARYNGOLOGY

## 2025-01-13 PROCEDURE — 3600000008 HC OR TIME - EACH INCREMENTAL 1 MINUTE - PROCEDURE LEVEL THREE: Performed by: OTOLARYNGOLOGY

## 2025-01-13 RX ORDER — ROCURONIUM BROMIDE 10 MG/ML
INJECTION, SOLUTION INTRAVENOUS AS NEEDED
Status: DISCONTINUED | OUTPATIENT
Start: 2025-01-13 | End: 2025-01-13

## 2025-01-13 RX ORDER — LIDOCAINE HYDROCHLORIDE 10 MG/ML
0.1 INJECTION, SOLUTION INFILTRATION; PERINEURAL ONCE
Status: DISCONTINUED | OUTPATIENT
Start: 2025-01-13 | End: 2025-01-13 | Stop reason: HOSPADM

## 2025-01-13 RX ORDER — HYDROMORPHONE HYDROCHLORIDE 0.2 MG/ML
0.2 INJECTION INTRAMUSCULAR; INTRAVENOUS; SUBCUTANEOUS EVERY 5 MIN PRN
Status: DISCONTINUED | OUTPATIENT
Start: 2025-01-13 | End: 2025-01-13 | Stop reason: HOSPADM

## 2025-01-13 RX ORDER — LIDOCAINE HYDROCHLORIDE 10 MG/ML
INJECTION, SOLUTION INTRAVENOUS AS NEEDED
Status: DISCONTINUED | OUTPATIENT
Start: 2025-01-13 | End: 2025-01-13

## 2025-01-13 RX ORDER — ONDANSETRON HYDROCHLORIDE 2 MG/ML
4 INJECTION, SOLUTION INTRAVENOUS ONCE AS NEEDED
Status: DISCONTINUED | OUTPATIENT
Start: 2025-01-13 | End: 2025-01-13 | Stop reason: HOSPADM

## 2025-01-13 RX ORDER — HYDRALAZINE HYDROCHLORIDE 20 MG/ML
5 INJECTION INTRAMUSCULAR; INTRAVENOUS EVERY 30 MIN PRN
Status: DISCONTINUED | OUTPATIENT
Start: 2025-01-13 | End: 2025-01-13 | Stop reason: HOSPADM

## 2025-01-13 RX ORDER — ONDANSETRON HYDROCHLORIDE 2 MG/ML
INJECTION, SOLUTION INTRAVENOUS AS NEEDED
Status: DISCONTINUED | OUTPATIENT
Start: 2025-01-13 | End: 2025-01-13

## 2025-01-13 RX ORDER — EPINEPHRINE 1 MG/ML
INJECTION INTRAMUSCULAR; INTRAVENOUS; SUBCUTANEOUS AS NEEDED
Status: DISCONTINUED | OUTPATIENT
Start: 2025-01-13 | End: 2025-01-13 | Stop reason: HOSPADM

## 2025-01-13 RX ORDER — PROPOFOL 10 MG/ML
INJECTION, EMULSION INTRAVENOUS AS NEEDED
Status: DISCONTINUED | OUTPATIENT
Start: 2025-01-13 | End: 2025-01-13

## 2025-01-13 RX ORDER — LIDOCAINE HYDROCHLORIDE 40 MG/ML
SOLUTION TOPICAL AS NEEDED
Status: DISCONTINUED | OUTPATIENT
Start: 2025-01-13 | End: 2025-01-13 | Stop reason: HOSPADM

## 2025-01-13 RX ORDER — HYDROMORPHONE HYDROCHLORIDE 0.2 MG/ML
0.1 INJECTION INTRAMUSCULAR; INTRAVENOUS; SUBCUTANEOUS EVERY 5 MIN PRN
Status: DISCONTINUED | OUTPATIENT
Start: 2025-01-13 | End: 2025-01-13 | Stop reason: HOSPADM

## 2025-01-13 RX ORDER — ALBUTEROL SULFATE 0.83 MG/ML
2.5 SOLUTION RESPIRATORY (INHALATION) ONCE AS NEEDED
Status: COMPLETED | OUTPATIENT
Start: 2025-01-13 | End: 2025-01-13

## 2025-01-13 RX ORDER — SODIUM CHLORIDE, SODIUM LACTATE, POTASSIUM CHLORIDE, CALCIUM CHLORIDE 600; 310; 30; 20 MG/100ML; MG/100ML; MG/100ML; MG/100ML
100 INJECTION, SOLUTION INTRAVENOUS CONTINUOUS
Status: DISCONTINUED | OUTPATIENT
Start: 2025-01-13 | End: 2025-01-13 | Stop reason: HOSPADM

## 2025-01-13 RX ADMIN — ONDANSETRON HYDROCHLORIDE 4 MG: 2 INJECTION INTRAMUSCULAR; INTRAVENOUS at 08:41

## 2025-01-13 RX ADMIN — SUGAMMADEX 200 MG: 100 INJECTION, SOLUTION INTRAVENOUS at 08:42

## 2025-01-13 RX ADMIN — ROCURONIUM BROMIDE 25 MG: 10 INJECTION, SOLUTION INTRAVENOUS at 07:56

## 2025-01-13 RX ADMIN — ALBUTEROL SULFATE 2.5 MG: 2.5 SOLUTION RESPIRATORY (INHALATION) at 08:57

## 2025-01-13 RX ADMIN — PROPOFOL 130 MG: 10 INJECTION, EMULSION INTRAVENOUS at 07:55

## 2025-01-13 RX ADMIN — PROPOFOL 30 MG: 10 INJECTION, EMULSION INTRAVENOUS at 08:13

## 2025-01-13 RX ADMIN — DEXAMETHASONE SODIUM PHOSPHATE 6 MG: 4 INJECTION, SOLUTION INTRAMUSCULAR; INTRAVENOUS at 08:07

## 2025-01-13 RX ADMIN — SODIUM CHLORIDE, POTASSIUM CHLORIDE, SODIUM LACTATE AND CALCIUM CHLORIDE: 600; 310; 30; 20 INJECTION, SOLUTION INTRAVENOUS at 07:50

## 2025-01-13 RX ADMIN — LIDOCAINE HYDROCHLORIDE 40 MG: 10 INJECTION, SOLUTION INTRAVENOUS at 07:55

## 2025-01-13 SDOH — HEALTH STABILITY: MENTAL HEALTH: CURRENT SMOKER: 1

## 2025-01-13 ASSESSMENT — PAIN SCALES - GENERAL
PAINLEVEL_OUTOF10: 0 - NO PAIN

## 2025-01-13 ASSESSMENT — PAIN - FUNCTIONAL ASSESSMENT
PAIN_FUNCTIONAL_ASSESSMENT: 0-10

## 2025-01-13 ASSESSMENT — COLUMBIA-SUICIDE SEVERITY RATING SCALE - C-SSRS
1. IN THE PAST MONTH, HAVE YOU WISHED YOU WERE DEAD OR WISHED YOU COULD GO TO SLEEP AND NOT WAKE UP?: NO
6. HAVE YOU EVER DONE ANYTHING, STARTED TO DO ANYTHING, OR PREPARED TO DO ANYTHING TO END YOUR LIFE?: NO
2. HAVE YOU ACTUALLY HAD ANY THOUGHTS OF KILLING YOURSELF?: NO

## 2025-01-13 NOTE — ANESTHESIA PROCEDURE NOTES
Airway  Date/Time: 1/13/2025 7:58 AM  Urgency: elective    Airway not difficult    Staffing  Performed: CRNA   Authorized by: Martín Kramer MD    Performed by: RADHA Soliz-MARCELLE  Patient location during procedure: OR    Indications and Patient Condition  Indications for airway management: anesthesia  Spontaneous Ventilation: absent  Sedation level: deep  Preoxygenated: yes  Patient position: sniffing  Mask difficulty assessment: 1 - vent by mask  Planned trial extubation    Final Airway Details  Final airway type: endotracheal airway      Successful airway: ETT  Cuffed: yes   Successful intubation technique: direct laryngoscopy  Facilitating devices/methods: intubating stylet  Endotracheal tube insertion site: oral  Blade: Aura  Blade size: #3  ETT size (mm): 5.5  Cormack-Lehane Classification: grade I - full view of glottis  Placement verified by: chest auscultation and capnometry   Measured from: lips  ETT to lips (cm): 19  Number of attempts at approach: 1  Number of other approaches attempted: 0    Additional Comments  Lips in preanesthetic condition after intubation.

## 2025-01-13 NOTE — ANESTHESIA PREPROCEDURE EVALUATION
Patient: Ingris Mckinney    Procedure Information       Date/Time: 01/13/25 0730    Procedure: Direct Laryngoscopy    [facility] / [facility]    Surgeons: Carlito Bradford MD          Past Medical History:   Diagnosis Date    At risk for falling     COPD (chronic obstructive pulmonary disease) (Multi)     Hypoxia     Malnutrition (Multi)     Personal history of other diseases of the respiratory system     History of chronic obstructive lung disease    Respiratory failure (Multi)     Tachycardia     Weakness       Relevant Problems   Cardiac   (+) Essential hypertension   (+) Hyperlipidemia      Pulmonary   (+) COPD with exacerbation (Multi)   (+) Centrilobular emphysema (Multi)      Neuro   (+) Anxiety   (+) Depression      Hematology   (+) Blood loss anemia      Musculoskeletal   (+) Degenerative joint disease (DJD) of hip     Past Surgical History:   Procedure Laterality Date    CT GUIDED IMAGING FOR NEEDLE PLACEMENT  5/5/2020    CT GUIDED IMAGING FOR NEEDLE PLACEMENT [facility]    OTHER SURGICAL HISTORY  11/11/2022    Hip surgery    OTHER SURGICAL HISTORY  11/11/2022    Arm surgery    OTHER SURGICAL HISTORY  11/11/2022    Leg surgery      Clinical information reviewed:   Tobacco  Allergies  Meds   Med Hx  Surg Hx  OB Status  Fam Hx  Soc   Hx        NPO Detail:  NPO/Void Status  Carbohydrate Drink Given Prior to Surgery? : N  Date of Last Liquid: 01/12/25  Time of Last Liquid: 2315  Date of Last Solid: 01/12/25  Time of Last Solid: 1930  Last Intake Type: Clear fluids  Time of Last Void: 0622         Physical Exam    Airway  Mallampati: II  TM distance: >3 FB  Neck ROM: full     Cardiovascular    Dental    Pulmonary    Abdominal            Anesthesia Plan    History of general anesthesia?: yes  History of complications of general anesthesia?: no    ASA 4     general     The patient is a current smoker.  Patient was previously instructed to abstain from smoking on day of procedure.  Patient  smoked on day of procedure.    intravenous induction   Postoperative administration of opioids is intended.  Anesthetic plan and risks discussed with patient.    Plan discussed with attending.

## 2025-01-13 NOTE — ANESTHESIA POSTPROCEDURE EVALUATION
Patient: Ingris Mckinney    Procedure Summary       Date: 01/13/25 Room / Location: [facility] / [facility]    Anesthesia Start: 0750 Anesthesia Stop: 0855    Procedure: Direct Laryngoscopy Diagnosis:       Lesion of larynx      (Lesion of larynx [J38.7])    Surgeons: Carlito Bradford MD Responsible Provider: Martín Kramer MD    Anesthesia Type: general ASA Status: 4            Anesthesia Type: general    Vitals Value Taken Time   /105 01/13/25 0855   Temp  01/13/25 0857   Pulse 102 01/13/25 0856   Resp 17 01/13/25 0856   SpO2 100 % 01/13/25 0856   Vitals shown include unfiled device data.    Anesthesia Post Evaluation    Patient location during evaluation: PACU  Patient participation: complete - patient participated  Level of consciousness: awake  Pain management: adequate  Airway patency: patent  Cardiovascular status: acceptable  Respiratory status: acceptable  Hydration status: acceptable  Postoperative Nausea and Vomiting: none        There were no known notable events for this encounter.

## 2025-01-13 NOTE — POST-PROCEDURE NOTE
0953-Patient brought back from PACU. Patient awake and alert, denies any pain or nausea. Denies any lightheadedness or dizziness. Patient has a moist unproductive cough, states she had prior to admission. No blood noted with cough. No signs of distress observed. Gingerale provided per request. Patient denies any other needs at this time, requests ride to wait in waiting room. Call light within reach.     1011-Patient tolerating gingerale well, denies any nausea, requesting crackers. Vital signs stable. Crackers provided per request. Patient denies any other needs.     1014-Discharge instructions explained to patient and copy provided, patient denies any questions. Patient to follow up with doctor as scheduled.     1020-IV site removed, tip intact, pressure applied, site bandaged. Patient assisted with getting dressed.     1029-Patient discharged home via wheelchair accompanied by volunteer and sister.

## 2025-01-13 NOTE — OP NOTE
OP NOTE     Date: 2025  OR Location: [facility]    Name: Ingris Mckinney : 1955 Age: 69 y.o. MRN: [id number]  Sex: female      Diagnosis  Pre-op Diagnosis    Pre-Op Diagnosis Codes:      * Lesion of larynx [J38.7] Post-op Diagnosis     Pre-Op Diagnosis Codes:      * Lesion of larynx [J38.7]     Procedures  Direct laryngosocopy with biopsy left supraglottic mass       Surgeons      Carlito Bradford MD     Assistant:  See OR log    Procedure Summary  Anesthesia: General  Anesthesia Staff: Anesthesiologist: Martín Kramer MD  CRNA: MONICA Soliz    Estimated Blood Loss: <5 cc      Specimen:   ID Type Source Tests Collected by Time   1 : left super epiglottis mass Tissue EPIGLOTTIS SURGICAL PATHOLOGY EXAM Carlito Bradford MD 2025 0812   2 : super epiglotiss mass specimen number 2 Tissue EPIGLOTTIS SURGICAL PATHOLOGY EXAM Carlito Bradford MD 2025 0839         Drains and/or Catheters: none     Implants: NONE     Findings: left false vocal cord/mass lesion.  Non obstructive.  Frozen section consistent with squamous cell caricnoma    Indications: Ingris Mckinney is a 69 y.o. year old female patient who is having surgery for left vocal cord paralysis with lesion seen left false vocal cord.  Presents today for this procedure.      The patient was seen in the preoperative area. The risks, benefits, complications, treatment options, non-operative alternatives, expected recovery and outcomes were discussed with the patient. The patient concurred with the proposed plan, giving informed consent.  The site of surgery was properly noted/marked if necessary per policy. The patient has been actively warmed in preoperative area.     Procedure Details: The patient was seen and identified in the preoperative area and transported to the operating room and positioned on the table in a supine fashion.  General anesthesia was induced and the patient was orotracheally intubated without difficulty.      The table was turned 90  degrees and the patient was draped in the standard fashion.  Moist gauze was placed to protect the gingiva.  Timeout was performed.  A Dedo laryngoscope was introduced to the oral cavity and advanced to the larynx.  Visualization was excellent.  There was irregularity confined to the left false vocal cord from anterior to posterior.  It was not obstructive.  Leukoplakia was seen.  It was not particularly bulky.  It extended a bit into the laryngeal ventricle on the left but spared the true vocal cord.  It did not appear to cross to the right-hand side.  Vallecula, base of tongue, hypopharynx were without mucosal irregularity.  Biopsies were taken of the left supraglottic lesion and sent for frozen section consistent with squamous cell carcinoma, invasion was difficult to assess on frozen section.  Additional generous biopsies from this area were taken for permanent section.  Epinephrine soaked pledget was placed and hemostasis was excellent.  This was removed and laryngotracheal anesthesia was applied and suctioned.  There were no other lesions of the larynx seen.  The laryngoscope was removed.  The gauze was removed.  The patient was awakened, extubated, and transported to the recovery room in good condition.  She tolerated the procedure well and there were no apparent intraoperative complications.    Complications:  None; patient tolerated the procedure well.    Disposition: PACU - hemodynamically stable.  Condition: stable             Carlito Bradford MD  Phone Number: [phone]

## 2025-01-24 ENCOUNTER — APPOINTMENT (OUTPATIENT)
Dept: OTOLARYNGOLOGY | Facility: CLINIC | Age: 70
End: 2025-01-24
Payer: MEDICARE

## 2025-01-24 VITALS — BODY MASS INDEX: 14.92 KG/M2 | HEIGHT: 63 IN | TEMPERATURE: 97 F | WEIGHT: 84.2 LBS

## 2025-01-24 DIAGNOSIS — R49.0 HOARSENESS: ICD-10-CM

## 2025-01-24 DIAGNOSIS — J38.7 LESION OF LARYNX: Primary | ICD-10-CM

## 2025-01-24 DIAGNOSIS — J38.01 UNILATERAL COMPLETE PARALYSIS OF VOCAL CORD: ICD-10-CM

## 2025-01-24 LAB
LABORATORY COMMENT REPORT: NORMAL
Lab: NORMAL
PATH REPORT.FINAL DX SPEC: NORMAL
PATH REPORT.GROSS SPEC: NORMAL
PATH REPORT.RELEVANT HX SPEC: NORMAL
PATH REPORT.TOTAL CANCER: NORMAL
RESIDENT REVIEW: NORMAL

## 2025-01-24 PROCEDURE — 1159F MED LIST DOCD IN RCRD: CPT | Performed by: OTOLARYNGOLOGY

## 2025-01-24 PROCEDURE — 3008F BODY MASS INDEX DOCD: CPT | Performed by: OTOLARYNGOLOGY

## 2025-01-24 PROCEDURE — 99213 OFFICE O/P EST LOW 20 MIN: CPT | Performed by: OTOLARYNGOLOGY

## 2025-01-24 PROCEDURE — 1160F RVW MEDS BY RX/DR IN RCRD: CPT | Performed by: OTOLARYNGOLOGY

## 2025-01-24 PROCEDURE — 31575 DIAGNOSTIC LARYNGOSCOPY: CPT | Performed by: OTOLARYNGOLOGY

## 2025-01-24 NOTE — PROGRESS NOTES
Ingris Mckinney is a 69 y.o. female status post direct laryngoscopy and biopsy.  The operative frozen section was consistent with at least squamous cell carcinoma in situ and we await the final pathology but it is clinically apparent that this appears to be an invasive squamous cell carcinoma.  She has been doing well and breathing well.  She has not had hemoptysis.  Her voice remains weak    Prior history: History of smoking and vocal cord paralysis diagnosed November 2022.Since CT scan was without irregularity at that time.  She has been followed for some subcentimeter lung nodules.  She underwent injection medialization January 16, 2023 and had good result from this.  She presented for a couple follow-ups and seem to have some spontaneous improvement of the vocal cord mobility.  She has been lost to follow-up since and was last seen May 2023.  12 days ago she experienced acute decline of her voice and presents for evaluation of this.  She had been ceasing her smoking and then resuming.  She has been having a little bit of coughing but seems to be swallowing well.  No hemoptysis.  She has some intermittent left throat pain but not now      Past Medical History:   Diagnosis Date    At risk for falling     COPD (chronic obstructive pulmonary disease) (Multi)     Hypoxia     Malnutrition (Multi)     Personal history of other diseases of the respiratory system     History of chronic obstructive lung disease    Respiratory failure (Multi)     Tachycardia     Weakness             Medications:     Current Outpatient Medications:     albuterol 2.5 mg /3 mL (0.083 %) nebulizer solution, Take 3 mL (2.5 mg) by nebulization every 4 hours if needed for wheezing., Disp: , Rfl:     albuterol 90 mcg/actuation inhaler, Inhale 2 puffs every 4 hours if needed., Disp: , Rfl:     alendronate (Fosamax) 70 mg tablet, Take 1 tablet (70 mg) by mouth every 7 days. Take in the morning with a full glass of water, on an empty stomach, and do not  "take anything else by mouth or lie down for the next 30 min., Disp: 12 tablet, Rfl: 3    Dulera 200-5 mcg/actuation inhaler, Inhale 2 puffs twice a day., Disp: , Rfl:     rosuvastatin (Crestor) 10 mg tablet, Take 1 tablet (10 mg) by mouth once daily., Disp: 90 tablet, Rfl: 2    umeclidinium (Incruse Ellipta) 62.5 mcg/actuation inhalation, Inhale 1 puff (62.5 mcg) once daily., Disp: , Rfl:     acetaminophen (Tylenol) 325 mg tablet, Take 1 tablet (325 mg) by mouth once daily. (Patient not taking: Reported on 1/24/2025), Disp: , Rfl:      Allergies:  Allergies   Allergen Reactions    Scallops Anaphylaxis    Iodinated Contrast Media Itching        Physical Exam:  Last Recorded Vitals  Temperature 36.1 °C (97 °F), height 1.6 m (5' 3\"), weight (!) 38.2 kg (84 lb 3.2 oz).  General:     General appearance: Well-developed, well-nourished in no acute distress.       Voice: Breathy, weak       Head/face: Normal appearance; nontender to palpation     Facial nerve function: Normal and symmetric bilaterally.    Oral/oropharynx:     Oral vestibule: Normal labial and gingival mucosa     Tongue/floor of mouth: Normal without lesion     Oropharynx: Clear.  No lesions present of the hard/soft palate, posterior pharynx    Neck:     Neck: Normal appearance, trachea midline     Salivary glands: Normal to palpation bilaterally     Lymph nodes: No cervical lymphadenopathy to palpation     Thyroid: No thyromegaly.  No palpable nodules     Range of motion: Normal    Neurological:     Cortical functions: Alert and oriented x3, appropriate affect       Larynx/hypopharynx:     Laryngeal findings: Mirror exam inadequate or limited secondary to enlarged base of tongue and/or excessive gagging.  Flexible laryngoscopy performed secondary to inadequate mirror examination.  Verbal informed consent the flexible laryngoscope was passed through the nasal cavity.  Normal epiglottis, aryepiglottic folds, arytenoids, false vocal cords, true vocal cords.  " She has normal vocal cord mobility on the right.  Left side again fixed in the paramedian position.  Mildly bowed.  Mildly shortened.  She has mucosal irregularity of the anterior left false vocal cord  Nasopharynx:     Nasopharynx: Normal    Ear:     Ear canal: Normal bilaterally     Tympanic membrane: Intact and mobile bilaterally     Pinna: Normal bilaterally     Hearing:  Gross hearing assessment normal by voice    Nose:     Visualized using: Anterior rhinoscopy     Nasopharynx: Inadequate mirror exam secondary to gag, anatomy.       Nasal dorsum: Nontraumatic midline appearance     Septum: Midline     Inferior turbinates: Normally sized     Mucosa: Bilateral, pink, normal appearing       ASSESSMENT/PLAN:  Left vocal cord paralysis.  Pathology and clinical findings consistent with invasive squamous cell carcinoma.  We discussed the findings at length.  CT scan of the neck was ordered.  She has a contrast reaction with 3 days of itching so this was ordered without contrast.  She was referred to Dr. Rivas for his opinion.  I will see her back after        Carlito Bradford MD

## 2025-02-03 ENCOUNTER — HOSPITAL ENCOUNTER (OUTPATIENT)
Dept: RADIATION ONCOLOGY | Facility: CLINIC | Age: 70
Setting detail: RADIATION/ONCOLOGY SERIES
Discharge: HOME | End: 2025-02-03
Payer: MEDICARE

## 2025-02-03 DIAGNOSIS — C32.1 PRIMARY SQUAMOUS CELL CARCINOMA OF SUPRAGLOTTIS (MULTI): ICD-10-CM

## 2025-02-04 ENCOUNTER — HOSPITAL ENCOUNTER (OUTPATIENT)
Dept: RADIOLOGY | Facility: HOSPITAL | Age: 70
Discharge: HOME | End: 2025-02-04
Payer: MEDICARE

## 2025-02-04 DIAGNOSIS — J38.7 LESION OF LARYNX: ICD-10-CM

## 2025-02-04 PROCEDURE — 70490 CT SOFT TISSUE NECK W/O DYE: CPT

## 2025-02-04 PROCEDURE — 70490 CT SOFT TISSUE NECK W/O DYE: CPT | Performed by: RADIOLOGY

## 2025-02-07 NOTE — PROGRESS NOTES
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 Mckinney is a 69 y.o. woman who had been following with ENT for history of vocal cord paralysis without CT abnormality since , which was successfully managed with injection medialization. She then developed acute worsening of her voice in 2024, with scope exam shortly after that showed mucosal irregularity along the anterior left false cord. The left true vocal cord was fixed in the paramedian position. The right true vocal cord exhibited normal movement. She was taken to the operating room for direct and biopsy on 2025. Operative findings showed a left false cord mass lesion which was not obstructive, and extended from anterior to posterior. The mass extended to the laryngeal ventricle on the left, but spared the true vocal cord. The mass did not appear to cross to the right. The vallecula, base of tongue and hypopharynx were normal. Biopsies from the left supraglottic mass were positive for invasive moderately differentiated squamous cell carcinoma. She had a CT neck on 2025, which was personally reviewed. This showed an enhancing mass in the left supraglottic larynx, 1.0 x 1.7 x 1.4 cm in size, which approached the epiglottis without definitive invasion. There was an area of irregularity/thickening on the right true vocal cord of unclear etiology.    Clinically,  she is overall fair. She has a relatively low baseline weight, though has lost an additional 8 pounds over the past. She denies any significant dysphagia or odynophagia, but notes overall decreased appetite. She has occasional mild left-sided throat discomfort. She denies any nausea or vomiting. She denies any coughing or choking on foods. She denies any new cough or hemoptysis. She denies any new breathing difficulties, but uses supplemental oxygen occasionally at home.         I have scoped her multiple times in the office and had a reasonable view with the microscope in the operating room. If she has disease in the contralateral cord it has not struck me as being evident in a way that catches my attention as atypical appearance. I would support the PET scan to see which you can see but I will be surprised and a bit humbled if she has a separate focus/activity on that right side.         The patient was seen for an opinion regarding radiation options for the diagnosis above. I personally reviewed the available outside records and imaging studies as detailed in the HPI. The allergies, medications, past medical history, surgical history, social history, family history, and review of systems were reviewed.     Prior Radiotherapy:  No radiation treatments to show. (Treatments may have been administered in another system.)       Past Medical History:    Past Medical History:   Diagnosis Date    Anxiety     At risk for falling     COPD (chronic obstructive pulmonary disease) (Multi)     Hypercholesteremia     Hypoxia     Larynx cancer (Multi)     Malnutrition (Multi)     Personal history of other diseases of the respiratory system     History of chronic obstructive lung disease    Respiratory failure (Multi)     Tachycardia     Weakness         Past Surgical History:    Past Surgical History:   Procedure Laterality Date    CT GUIDED IMAGING FOR NEEDLE PLACEMENT  05/05/2020    CT GUIDED IMAGING FOR NEEDLE PLACEMENT [facility]    LARYNGOSCOPY      direct w/ bx    OTHER SURGICAL HISTORY  11/11/2022    Hip surgery    OTHER SURGICAL HISTORY  11/11/2022    Arm surgery    OTHER SURGICAL HISTORY  11/11/2022    Leg surgery        Family History:  Cancer-related family history is not on file.    Social History:    Social History     Tobacco Use    Smoking status: Some Days     Current packs/day: 0.25     Average packs/day: 1 pack/day for 49.9 years (49.7 ttl pk-yrs)     Types: Cigarettes     Start date: 12/30/1974     Last attempt to quit: 8/1/2024     Passive exposure: Current    Smokeless tobacco: Never   Vaping Use    Vaping status: Never Used   Substance Use Topics    Alcohol use: Never    Drug use: Never       Allergies:    Allergies   Allergen Reactions    Scallops Anaphylaxis    Iodinated Contrast Media Itching        Medications:    Current Outpatient Medications:     albuterol 2.5 mg /3 mL (0.083 %) nebulizer solution, Take 3 mL (2.5 mg) by nebulization every 4 hours if needed for wheezing., Disp: , Rfl:     albuterol 90 mcg/actuation inhaler, Inhale 2 puffs every 4 hours if needed., Disp: , Rfl:     alendronate (Fosamax) 70 mg tablet, Take 1 tablet (70 mg) by mouth every 7 days. Take in the morning with a full glass of water, on an empty stomach, and do not take anything else by mouth or lie down for the next 30 min., Disp: 12 tablet, Rfl: 3    Dulera 200-5 mcg/actuation inhaler, Inhale 2 puffs twice a day., Disp: , Rfl:     rosuvastatin (Crestor) 10 mg tablet, Take 1 tablet (10 mg) by mouth once daily., Disp: 90 tablet, Rfl: 2    umeclidinium (Incruse Ellipta) 62.5 mcg/actuation inhalation, Inhale 1 puff (62.5 mcg) once daily., Disp: , Rfl:     acetaminophen (Tylenol) 325 mg tablet, Take 1 tablet (325 mg) by mouth once daily. (Patient not taking: Reported on 2/10/2025), Disp: , Rfl:   No current facility-administered medications for this encounter.      Review of Systems:  Review of Systems - Oncology    Performance  Status:  The Karnofsky performance scale today is 80, Normal activity with effort; some signs or symptoms of disease (ECOG equivalent 1).        OBJECTIVE  /72   Pulse (!) 120   Temp 35.6 °C (96.1 °F) (Temporal)   Resp 18   Wt (!) 35.6 kg (78 lb 7.7 oz)   SpO2 93%   BMI 13.90 kg/m²    Daily Weight  02/10/25 : (!) 35.6 kg (78 lb 7.7 oz)  02/10/25 : (!) 35.6 kg (78 lb 7.7 oz)  01/24/25 : (!) 38.2 kg (84 lb 3.2 oz)  01/13/25 : (!) 39 kg (86 lb)  12/30/24 : (!) 39.2 kg (86 lb 6.4 oz)  12/09/24 : (!) 3.629 kg (8 lb)  10/18/24 : (!) 42.2 kg (93 lb)  09/23/24 : (!) 42.2 kg (93 lb)  07/16/24 : (!) 41.3 kg (91 lb)  05/05/23 : 50.5 kg (111 lb 6.4 oz)     Physical Exam  Vitals reviewed.   Constitutional:       General: She is not in acute distress.     Appearance: Normal appearance. She is not ill-appearing.   HENT:      Head: Normocephalic and atraumatic.      Mouth/Throat:      Mouth: Mucous membranes are moist.      Comments: Edentulous. No appreciable masses/lesions within the oral cavity.    Nasopharyngoscopy was performed after oral consent was obtained. The nasopharyngoscope entered into the right nasal cavity without difficulty. The nasal cavity, septum and mucosa all appeared benign. The scope was then advanced to the nasopharynx where the torus tubarius and fossae were visualized and there were no significant findings. Oropharyngeal exam revealed a normal base of tongue with lymphoid tissue. There was an exophytic lesion of the left false cord, which extended to midline anteriorly. The mass appeared to approach the arytenoid posteriorly on the left. On phonation, the right vocal cord appeared to move normally, while the left appeared fixed. The mass did not appear to involve the laryngeal surface of the epiglottis. The hypopharynx appeared normal, as did the subglottis. The scope was withdrawn without complication.    Eyes:      Conjunctiva/sclera: Conjunctivae normal.   Cardiovascular:      Rate and  Rhythm: Normal rate.   Pulmonary:      Effort: Pulmonary effort is normal.   Abdominal:      General: There is no distension.   Musculoskeletal:         General: Normal range of motion.   Lymphadenopathy:      Cervical: No cervical adenopathy.   Skin:     General: Skin is warm and dry.   Neurological:      Mental Status: She is alert and oriented to person, place, and time.   Psychiatric:         Mood and Affect: Mood normal.         Behavior: Behavior normal.          Laboratory Review:  There are no laboratory contraindications to radiation therapy.    The pertinent lab results were reviewed and discussed with the patient.  Notably, per HPI      Pathology Review:  The pertinent pathology results were reviewed and discussed with the patient.  Notably, per HPI     Imaging:  The pertinent imaging results were reviewed and discussed with the patient.  Notably, per HPI       ASSESSMENT:   Ms. Mckinney is a 69 y.o. woman with left Primary squamous cell carcinoma of supraglottis (Multi), Clinical: cT3, here for a discussion of definitive radiation treatment.     PLAN:   We had an extensive discussion regarding the role of radiation in this setting. Unfortunately, due to her contrast allergy, evaluation based on noncontrast CT alone, is limited. She appears to have at least a locally advanced supraglottic primary, though her lymph node status is difficult to determine, and the irregularity seen along the right true vocal cord is of uncertain significance, though may be due to contralateral motion in the setting of a fixed left cord. Either way, with a likely locally advanced supraglottic primary, we would recommend obtaining PET/CT, particularly in the absence of diagnostic contrast imaging. From a treatment perspective, she would likely need at least needed radiation to a dose of 70 Gy fractions, though the addition of concurrent chemotherapy would also need to be considered depending on the full extent of her disease.      Given her very frail nutritional status, she may have difficulties tolerating a full course of treatment, and should also be evaluated for feeding tube placement as she has already been losing weight even prior to treatment. She was hesitant to pursue a feeding tube, but would like to proceed first with a PET/CT, and will then inform us of her decision. She also inquired about a surgical approach, which could potentially be considered, though may be difficult given the extent of disease. We offered a referral to our head and neck surgical colleagues for further discussion, though she would like to defer until after her PET/CT is completed.    We reviewed what a typical course of radiation would entail, which would extend over a several-week time period and is given Monday through Friday, 5 days per week. The acute side effects of radiation and potential late toxicities were reviewed in detail. During the course of treatment, side effects could include, but are not limited to fatigue, skin irritation, taste changes, pain/difficulty swallowing, weight loss and possible feeding tube placement, dry mouth, and mouth/throat pain. The typical timeline for the resolution of these effects as well as available supportive therapies were reviewed. Potential long term side effects could include, but are not limited to, neck fibrosis, skin changes, tooth decay, voice changes, long-term swallowing difficulties or taste changes, dry mouth, chronic vessel changes, bone damage, and a small risk of second malignancies.     After the discussion, the patient was given the opportunity to ask questions which were subsequently answered, and our contact information was given.    Please contact our department with any further questions regarding the plan of management.    The length of the visit was 60 minutes. We spent more than 50% of this time discussing treatment options with the patient.    Recommendations:  -Radiation treatment to  a dose of 70 Gy in 35 fractions, with consideration of concurrent systemic therapy, pending final staging  -PET/CT to complete staging workup  -Consideration of surgical consultation  - CT simulation to be determined based on the above.  NCCN Guidelines were applicable to guide this patients treatment plan.   Noemy Rivas DO

## 2025-02-10 ENCOUNTER — SOCIAL WORK (OUTPATIENT)
Dept: CASE MANAGEMENT | Facility: HOSPITAL | Age: 70
End: 2025-02-10

## 2025-02-10 ENCOUNTER — NUTRITION (OUTPATIENT)
Dept: HEMATOLOGY/ONCOLOGY | Facility: CLINIC | Age: 70
End: 2025-02-10
Payer: MEDICARE

## 2025-02-10 ENCOUNTER — HOSPITAL ENCOUNTER (OUTPATIENT)
Dept: RADIATION ONCOLOGY | Facility: CLINIC | Age: 70
Setting detail: RADIATION/ONCOLOGY SERIES
Discharge: HOME | End: 2025-02-10
Payer: MEDICARE

## 2025-02-10 VITALS — WEIGHT: 78.48 LBS | BODY MASS INDEX: 13.91 KG/M2 | HEIGHT: 63 IN

## 2025-02-10 VITALS
HEART RATE: 120 BPM | RESPIRATION RATE: 18 BRPM | TEMPERATURE: 96.1 F | OXYGEN SATURATION: 93 % | WEIGHT: 78.48 LBS | BODY MASS INDEX: 13.9 KG/M2 | DIASTOLIC BLOOD PRESSURE: 72 MMHG | SYSTOLIC BLOOD PRESSURE: 113 MMHG

## 2025-02-10 DIAGNOSIS — J38.7 LESION OF LARYNX: ICD-10-CM

## 2025-02-10 DIAGNOSIS — C32.1 PRIMARY SQUAMOUS CELL CARCINOMA OF SUPRAGLOTTIS (MULTI): Primary | ICD-10-CM

## 2025-02-10 PROCEDURE — 31575 DIAGNOSTIC LARYNGOSCOPY: CPT | Performed by: STUDENT IN AN ORGANIZED HEALTH CARE EDUCATION/TRAINING PROGRAM

## 2025-02-10 PROCEDURE — G2211 COMPLEX E/M VISIT ADD ON: HCPCS | Performed by: STUDENT IN AN ORGANIZED HEALTH CARE EDUCATION/TRAINING PROGRAM

## 2025-02-10 PROCEDURE — 99205 OFFICE O/P NEW HI 60 MIN: CPT | Performed by: STUDENT IN AN ORGANIZED HEALTH CARE EDUCATION/TRAINING PROGRAM

## 2025-02-10 PROCEDURE — 2500000004 HC RX 250 GENERAL PHARMACY W/ HCPCS (ALT 636 FOR OP/ED): Performed by: STUDENT IN AN ORGANIZED HEALTH CARE EDUCATION/TRAINING PROGRAM

## 2025-02-10 PROCEDURE — 99215 OFFICE O/P EST HI 40 MIN: CPT | Mod: 25 | Performed by: STUDENT IN AN ORGANIZED HEALTH CARE EDUCATION/TRAINING PROGRAM

## 2025-02-10 PROCEDURE — 2500000005 HC RX 250 GENERAL PHARMACY W/O HCPCS: Performed by: STUDENT IN AN ORGANIZED HEALTH CARE EDUCATION/TRAINING PROGRAM

## 2025-02-10 RX ORDER — LIDOCAINE HYDROCHLORIDE 20 MG/ML
2 INJECTION, SOLUTION EPIDURAL; INFILTRATION; INTRACAUDAL; PERINEURAL ONCE
Status: COMPLETED | OUTPATIENT
Start: 2025-02-10 | End: 2025-02-10

## 2025-02-10 RX ADMIN — PHENYLEPHRINE HYDROCHLORIDE 2 SPRAY: 0.5 SPRAY NASAL at 10:04

## 2025-02-10 RX ADMIN — LIDOCAINE HYDROCHLORIDE 40 MG: 20 INJECTION, SOLUTION EPIDURAL; INFILTRATION; INTRACAUDAL; PERINEURAL at 10:04

## 2025-02-10 SDOH — ECONOMIC STABILITY: INCOME INSECURITY: IN THE LAST 12 MONTHS, WAS THERE A TIME WHEN YOU WERE NOT ABLE TO PAY THE MORTGAGE OR RENT ON TIME?: NO

## 2025-02-10 SDOH — ECONOMIC STABILITY: TRANSPORTATION INSECURITY
IN THE PAST 12 MONTHS, HAS THE LACK OF TRANSPORTATION KEPT YOU FROM MEDICAL APPOINTMENTS OR FROM GETTING MEDICATIONS?: NO

## 2025-02-10 SDOH — ECONOMIC STABILITY: TRANSPORTATION INSECURITY
IN THE PAST 12 MONTHS, HAS LACK OF TRANSPORTATION KEPT YOU FROM MEETINGS, WORK, OR FROM GETTING THINGS NEEDED FOR DAILY LIVING?: NO

## 2025-02-10 ASSESSMENT — ENCOUNTER SYMPTOMS
CARDIOVASCULAR NEGATIVE: 1
SORE THROAT: 1
VOICE CHANGE: 1
COUGH: 1
APPETITE CHANGE: 1
UNEXPECTED WEIGHT CHANGE: 1
GASTROINTESTINAL NEGATIVE: 1
NERVOUS/ANXIOUS: 1
ENDOCRINE NEGATIVE: 1
SPEECH DIFFICULTY: 1
SHORTNESS OF BREATH: 1
LOSS OF SENSATION IN FEET: 0
DEPRESSION: 1
OCCASIONAL FEELINGS OF UNSTEADINESS: 1
BRUISES/BLEEDS EASILY: 1
EYES NEGATIVE: 1

## 2025-02-10 ASSESSMENT — LIFESTYLE VARIABLES
HOW MANY STANDARD DRINKS CONTAINING ALCOHOL DO YOU HAVE ON A TYPICAL DAY: PATIENT DOES NOT DRINK
HOW OFTEN DO YOU HAVE SIX OR MORE DRINKS ON ONE OCCASION: NEVER
HOW OFTEN DO YOU HAVE A DRINK CONTAINING ALCOHOL: NEVER
SKIP TO QUESTIONS 9-10: 1
AUDIT-C TOTAL SCORE: 0

## 2025-02-10 ASSESSMENT — PATIENT HEALTH QUESTIONNAIRE - PHQ9
SUM OF ALL RESPONSES TO PHQ9 QUESTIONS 1 AND 2: 0
2. FEELING DOWN, DEPRESSED OR HOPELESS: NOT AT ALL
1. LITTLE INTEREST OR PLEASURE IN DOING THINGS: NOT AT ALL

## 2025-02-10 ASSESSMENT — NCCN CANCER DISTRESS MANAGEMENT
NCCN EMOTIONAL CONCERNS: 7
NCCN PHYSICAL CONCERNS: 4
NCCN PHYSICAL CONCERNS: 9
NCCN PRACTICAL CONCERNS: 12
NCCN EMOTIONAL CONCERNS: 1

## 2025-02-10 ASSESSMENT — SOCIAL DETERMINANTS OF HEALTH (SDOH)
WITHIN THE LAST YEAR, HAVE YOU BEEN KICKED, HIT, SLAPPED, OR OTHERWISE PHYSICALLY HURT BY YOUR PARTNER OR EX-PARTNER?: NO
WITHIN THE LAST YEAR, HAVE YOU BEEN HUMILIATED OR EMOTIONALLY ABUSED IN OTHER WAYS BY YOUR PARTNER OR EX-PARTNER?: NO
WITHIN THE LAST YEAR, HAVE TO BEEN RAPED OR FORCED TO HAVE ANY KIND OF SEXUAL ACTIVITY BY YOUR PARTNER OR EX-PARTNER?: NO
HOW HARD IS IT FOR YOU TO PAY FOR THE VERY BASICS LIKE FOOD, HOUSING, MEDICAL CARE, AND HEATING?: NOT VERY HARD
WITHIN THE LAST YEAR, HAVE YOU BEEN AFRAID OF YOUR PARTNER OR EX-PARTNER?: NO

## 2025-02-10 ASSESSMENT — PAIN SCALES - GENERAL: PAINLEVEL_OUTOF10: 0-NO PAIN

## 2025-02-10 NOTE — PROGRESS NOTES
Radiation Oncology Nursing Note    Prior Radiotherapy:  No  No radiation treatments to show. (Treatments may have been administered in another system.)     Current Systemic Treatment:  No     Presence of Pacemaker or ICD:  No    History of Autoimmune or Connective Tissue Disorders:  No    Pain: The patient's current pain level was assessed.  They report currently having a pain of 0 out of 10.  They feel their pain is under control without the use of pain medications.    Review of Systems:  Review of Systems   Constitutional:  Positive for appetite change and unexpected weight change (down 10lbs in 3 month).   HENT:   Positive for sore throat (occasional) and voice change (x2 years).    Eyes: Negative.    Respiratory:  Positive for cough (occasional white/yellowish sputum) and shortness of breath (COPD).    Cardiovascular: Negative.    Gastrointestinal: Negative.    Endocrine: Negative.    Genitourinary: Negative.     Musculoskeletal:  Positive for gait problem (walks with cane).   Skin: Negative.    Neurological:  Positive for gait problem (walks with cane) and speech difficulty (vocal cord paralysis).   Hematological:  Bruises/bleeds easily.   Psychiatric/Behavioral:  The patient is nervous/anxious.    Education Documentation  Radiation Therapy, taught by Tamiko Mahoney RN at 2/10/2025 11:01 AM.  Learner: Family, Patient  Readiness: Acceptance  Method: Explanation  Response: Verbalizes Understanding    Treatment Plan and Schedule, taught by Tamiko Mahoney RN at 2/10/2025 11:01 AM.  Learner: Family, Patient  Readiness: Acceptance  Method: Explanation  Response: Verbalizes Understanding    Neutropenia During Cancer Treatment, taught by Tamiko Mahoney RN at 2/10/2025 11:01 AM.  Learner: Family, Patient  Readiness: Acceptance  Method: Explanation  Response: Verbalizes Understanding    Radiation Therapy (External Beam) : What Is Radiation Therapy, taught by Tamiko Mahoney RN at 2/10/2025 11:01 AM.  Learner: Family,  Patient  Readiness: Acceptance  Method: Explanation  Response: Verbalizes Understanding    Radiation Therapy (External Beam) : Side Effects, taught by Tamiko Mahoney RN at 2/10/2025 11:01 AM.  Learner: Family, Patient  Readiness: Acceptance  Method: Explanation  Response: Verbalizes Understanding    Radiation Therapy (External Beam) : Review, taught by Tamiko Mahoney RN at 2/10/2025 11:01 AM.  Learner: Family, Patient  Readiness: Acceptance  Method: Explanation  Response: Verbalizes Understanding    Radiation Therapy (External Beam) : Life During Treatment, taught by Tamiko Mahoney RN at 2/10/2025 11:01 AM.  Learner: Family, Patient  Readiness: Acceptance  Method: Explanation  Response: Verbalizes Understanding    Radiation Therapy (External Beam) : Getting Radiation, taught by Tamiko Mahoney RN at 2/10/2025 11:01 AM.  Learner: Family, Patient  Readiness: Acceptance  Method: Explanation  Response: Verbalizes Understanding    Education Comments  02/10/25 6664 Tamiko Mahoney RN  Patient did come with sister. Patient states that she has had vocal cord paralysis x2 years She does not have trouble swallowing but has lost about 10 lbs x3 months. She does no have a large appetite. Dr. Rivas did scope the patient today. Per Dr. Rivas, patient to have a Pet scan and we will follow up after. Patient was given information on radiation and what to expect. Did not go over in detail. If patient does end up getting radiation we review in depth what to expect and side effects. Patient verbalizes understanding with verbal teach back. Tamiko Mahoney MSN, RN, OCN    02/10/25 0974 Tamiko Mahoney RN  New patient here today to see Dr. Rivas for invasive squamous cell larynx cancer, moderately differentiated with vocal cord paralysis.

## 2025-02-10 NOTE — PROGRESS NOTES
"NUTRITION Assessment NOTE    Nutrition Assessment     Reason for Visit:  Ingris Mckinney is a 69 y.o. female who presents for primary SCC supraglottis  Pt in for radiation consult, asked to see 2/2 dx, weight status, weight loss  2/10- Pt present with sister Zulema Mckinney. Patient lives in [place], Zulema lives in [place], other sister in [facility]. Sisters help pt with buying groceries etc    Contact Zulema Mckinney [phone]    Patient Active Problem List   Diagnosis    Acute on chronic respiratory failure with hypoxia and hypercapnia (Multi)    Anxiety    Balance disorder    Blood loss anemia    Closed fracture of pelvis (Multi)    Closed fracture of right inferior pubic ramus    Degenerative joint disease (DJD) of hip    Depression    Essential hypertension    ETOH abuse    Hyperlipidemia    Hypoxemia    Centrilobular emphysema (Multi)    COPD with exacerbation (Multi)    Severe protein-calorie malnutrition (Multi)    Lesion of larynx    Primary squamous cell carcinoma of supraglottis (Multi)       Nutrition Significant Labs:  Lab Results   Component Value Date/Time    GLUCOSE 111 (H) 12/30/2024 1648     12/30/2024 1648    K 3.8 12/30/2024 1648     12/30/2024 1648    CO2 30 12/30/2024 1648    ANIONGAP 12 12/30/2024 1648    BUN 9 12/30/2024 1648    CREATININE 0.44 (L) 12/30/2024 1648    EGFR >90 12/30/2024 1648    CALCIUM 8.8 12/30/2024 1648    ALBUMIN 4.5 08/04/2020 1029    ALKPHOS 46 08/04/2020 1029    PROT 7.5 08/04/2020 1029    AST 22 08/04/2020 1029    BILITOT 0.6 08/04/2020 1029    ALT 24 08/04/2020 1029    MG 2.2 11/30/2019 0605     No results found for: \"VITD25\"      Anthropometrics:  Height: 160 cm (5' 2.99\")   Weight: (!) 35.6 kg (78 lb 7.7 oz)   BMI (Calculated): 13.91    IBW/kg (Dietitian Calculated): 52.2 kg Percent of IBW: 69 %       Daily Weight  02/10/25 : (!) 35.6 kg (78 lb 7.7 oz)  02/10/25 : (!) 35.6 kg (78 lb 7.7 oz)  01/24/25 : (!) 38.2 kg (84 lb 3.2 oz)  01/13/25 : (!) 39 kg (86 " lb)  12/30/24 : (!) 39.2 kg (86 lb 6.4 oz)  12/09/24 : (!) 3.629 kg (8 lb)  10/18/24 : (!) 42.2 kg (93 lb)  09/23/24 : (!) 42.2 kg (93 lb)  07/16/24 : (!) 41.3 kg (91 lb)  05/05/23 : 50.5 kg (111 lb 6.4 oz)  02/24/23 : 49 kg (108 lb)  01/20/23 : 48.4 kg (106 lb 12.8 oz)  12/14/22 : 49.4 kg (109 lb)  11/14/22 : 49 kg (108 lb)  11/11/22 : 49.4 kg (109 lb)  11/03/22 : 49 kg (108 lb)  10/18/22 : 49.9 kg (110 lb)  10/13/22 : 49.9 kg (110 lb)  09/19/22 : 49.9 kg (110 lb)  09/07/22 : 50.2 kg (110 lb 9.6 oz)       Weight Change %:  Weight History / % Weight Change: UBW ~ 100# +/- per sister though last weighed ~ 100# in 2023, 2024 weights low 90s. 9% weight loss over 1 month  Significant Weight Loss: Yes  Interpretation of Weight Loss: >5% in 1 month    Nutrition History:  Food & Nutrition History:    Food Allergies:  (scallops)  Food Intolerances:    Vitamin/mineral intake:       Herbal supplements:    Medication and Complementary/Alternative Medicine Use:    Dentition:    Sleep Habits:      Diet Recall:  Meal 1: oatmeal (not instant)  Snack 1:    Meal 2:    Snack 2:    Meal 3: meatballs, hot dogs, potato salad  Snack 3:    Food Variety:  (Grocery list typically: milk (she likes milk) bologny, bread, bananas, raisin bran, chewy chocolate chip cookies, [employer] frozen meals)  Oral Nutrition Supplement Use: Oral Nutrition Supplements:  (some form of Ensure (not plus) when sisters remember to buy for her / bring to her. Suggested they send to her via [employer] auto ship) Frequency: will drink 1-2/day      Fluid Intake:    Energy Intake: Fair 50-75 %    Food Preparation:  Cooking:    Grocery Shopping:    Dining Out:      Medications:  Current Outpatient Medications   Medication Instructions    acetaminophen (Tylenol) 325 mg tablet 1 tablet, Daily RT    albuterol 90 mcg/actuation inhaler 2 puffs, Every 4 hours PRN    albuterol 2.5 mg, Every 4 hours PRN    alendronate (FOSAMAX) 70 mg, oral, Every 7 days, Take in the morning with a  "full glass of water, on an empty stomach, and do not take anything else by mouth or lie down for the next 30 min.    Dulera 200-5 mcg/actuation inhaler 2 puffs, 2 times daily    rosuvastatin (CRESTOR) 10 mg, oral, Daily    umeclidinium (Incruse Ellipta) 62.5 mcg/actuation inhalation 1 puff, Daily RT       Nutrition Focused Physical Exam Findings:    Subcutaneous Fat Loss:   Orbital Fat Pads: Severe (dark circles, hollowing and loose skin)  Buccal Fat Pads: Severe (hollow, sunken and narrow face)  Triceps: Defer  Ribs: Defer    Muscle Wasting:  Temporalis: Severe (hollowed scooping depression)  Pectoralis (Clavicular Region): Severe (protruding prominent clavicle)  Deltoid/Trapezius: Severe (squared shoulders, acromion process prominent)  Interosseous: Severe (depressed area between thumb and forefinger)  Trapezius/Infraspinatus/Supraspinatus (Scapular Region): Severe (prominent visual scapula, depression between ribs, scapula or shoulder)  Quadriceps: Severe (depressions on inner and outer thigh)  Gastrocnemius: Severe (minimal muscle definition)    Physical Findings:  Hair:  (thinning)  Eyes: Negative  Skin: Negative  Teeth Findings: Edentulous (has \"temporary\" dentures, doesn't wear, they don't work. She can manage to eat a lot of different foods though without her dentures)  Edema: none    Estimated Needs:       Total Energy Estimated Needs in 24 hours (kCal): 1250 kCal  Energy Estimated Needs per kg Body Weight in 24 hours (kCal/kg): 35 kCal/kg (actual BW used for assessment weight)  Total Protein Estimated Needs in 24 Hours (g): 53 g  Protein Estimated Needs per kg Body Weight in 24 Hours (g/kg): 1.5 g/kg  Total Fluid Estimated Needs in 24 Hours (mL): 1250 mL  Method for Estimating 24 Hour Fluid Needs: 1 calorie/kg             Nutrition Diagnosis   Malnutrition Diagnosis  Patient has Malnutrition Diagnosis: Yes  Diagnosis Status: New  Malnutrition Diagnosis: Severe malnutrition related to chronic disease or " "condition  Related to: 9% weight loss over 1 month, poor oral intake, findings on NFPE (severe depletion fat and muscle stores)    Nutrition Diagnosis  Patient has Nutrition Diagnosis: Yes  Diagnosis Status (1): New  Nutrition Diagnosis 1: Predicted inadequate energy intake  Related to (1): pathophsyiology of disease and treatment  As Evidenced by (1): anticipated nutrition impact symptoms affecting oral intake and weight  Additional Nutrition Diagnosis: Diagnosis 2  Diagnosis Status (2): New  Nutrition Diagnosis 2: Increased energy expenditure  Related to (2): increased metabolic demand, disease process  As Evidenced by (2): cancer diagnosis, planned treatment       Nutrition Interventions/Recommendations   Nutrition Prescription: Oral nutrition high calorie, high protein soft diet, management of NIS    Nutrition Interventions:   Food and Nutrient Delivery: Goals: Include consistent meals with snacks in between meals. Incorporate soft high calorie high protein foods modified texture as needed. Add moisture as needed, gravy, broth, milk and other liquids.  Avoid dry harsh  foods and acidic foods/beverages. Include adequate fluid intake. Explained to pt she needs to make an effort to eat every 2 hours, build herself up before treatment. Suggested foods/snacks and making a \"snack basket\" to keep by her where she sits most of the day. Sister may buy a small refigerator for the living area to keep milk, beverages in.  Goals: PEG would be beneficial for pt who is severly malnourished. AFter PET they will make determination with RT per sister and make GI apt. Discussed basics of PEG / placement with them  Medical Food Supplement: Commercial beverage medical food supplement therapy  Goals: provided samples of Ensure Complete, suggeted 1/2 bottle 4 times daily. Recommended plus supplement, 1/2 bottle four times daily. Will attempt coverage from [facility] for Boost Plus BID. Also suggested continue to drink milk, chocolate " milk     Coordination of Care: Collaboration and referral of nutrition care:  (collaboration with other providers)  Goals: Ximena Tran LSW     Nutrition Education:   Nutrition Education Content: Content related nutrition education  AndOnc handouts:High Calorie High Protein Diet, High Calorie Snack Ideas, Poor Appetite, Oral Care, Sore Mouth and Throat, Taste and Smell Changes, Dry Mouth and Thick Saliva, Foods That Are Easy to Chew and Swallow, Soft and Moist High Protein Menu Ideas    Explained calorie and protein needs are increased with diagnosis and treatment. The importance of maintaining weight, adequate oral intake and fluids. Explained the role of protein, examples of protein sources provided and how to work into diet.      Prescription for Boost Plus TID sent to [facility]  Pt already receives O2 from [facility]           Nutrition Monitoring and Evaluation   Food and Nutrient Intake  Monitoring and Evaluation Plan: Energy intake, Meal/snack pattern, Protein intake, Fluid intake, Amount of food  Energy Intake: Estimated energy intake  Criteria: Meet >75% estimated energy needs- food recall  Fluid Intake: Estimated fluid intake  Criteria: maintain hydration  Amount of Food: Medical food intake  Criteria: ONS as recommeded  Meal/Snack Pattern: Estimated meal and snack pattern  Criteria: 4-6 meals/snacks daily  Estimated protein intake: Estimated protein intake  Criteria: meet >75% estimated protein needs - food recall    Anthropometric measurements  Monitoring and Evaluation Plan: Weight  Body Weight: Measured body weight  Criteria: maintain/gain weight                   Follow Up: through treatment  Contact information provided    Time Spent  Prep time on day of patient encounter: 5 minutes  Time spent directly with patient, family or caregiver: 30 minutes  Additional Time Spent on Patient Care Activities: 5 minutes  Documentation Time: 35 minutes  Other Time Spent: 0 minutes  Total: 75 minutes

## 2025-02-11 NOTE — PROGRESS NOTES
SW met with patient while she was here for a consultation. She was accompanied by her sister, Zulema. Pt lives on her own in her own home In [place]. She is however right on the boarder for [facility]. DAIANA is hoping that Ian Fernández would be able to pick her up and take her to appts. A NET application was signed for this or incase she stays at her other sister's home in [facility] during her treatments. Zulema resides in [place]. Application will be submitted  to see what the outcome is. Pt will b notified. Sister asked that she be called due to the difficult Pt has with communication, annemarie on the phone. Pt was agreeable to that.   Pt scored an 8 on the distress screen. This was due to worry anxiety and change in appearence. Pt has lost a lot of weight. Pt reports that she is just lazy. DAIANA called dietician to see Pt. Sister has shopped for Pt so she has nutritious food, Pt states that she is just not interested in eating it. She knows she should. Hopeful that she can get some supplements but SW advised Pt that she still needs to put in the effort. Both know that she needs to keep up her nutrition to be able to make it through treatments.  SW will send application in for the NET program and report back to patient/sister. SW provided business card and will remain available for future needs.

## 2025-02-13 ENCOUNTER — TELEPHONE (OUTPATIENT)
Dept: RADIATION ONCOLOGY | Facility: CLINIC | Age: 70
End: 2025-02-13

## 2025-02-19 ENCOUNTER — NUTRITION (OUTPATIENT)
Dept: HEMATOLOGY/ONCOLOGY | Facility: CLINIC | Age: 70
End: 2025-02-19
Payer: MEDICARE

## 2025-02-24 ENCOUNTER — HOSPITAL ENCOUNTER (OUTPATIENT)
Dept: RADIOLOGY | Facility: CLINIC | Age: 70
Discharge: HOME | End: 2025-02-24
Payer: MEDICARE

## 2025-02-24 DIAGNOSIS — C32.1 PRIMARY SQUAMOUS CELL CARCINOMA OF SUPRAGLOTTIS (MULTI): ICD-10-CM

## 2025-02-24 PROCEDURE — A9552 F18 FDG: HCPCS | Performed by: STUDENT IN AN ORGANIZED HEALTH CARE EDUCATION/TRAINING PROGRAM

## 2025-02-24 PROCEDURE — 78815 PET IMAGE W/CT SKULL-THIGH: CPT

## 2025-02-24 PROCEDURE — 3430000001 HC RX 343 DIAGNOSTIC RADIOPHARMACEUTICALS: Performed by: STUDENT IN AN ORGANIZED HEALTH CARE EDUCATION/TRAINING PROGRAM

## 2025-02-24 RX ORDER — FLUDEOXYGLUCOSE F 18 200 MCI/ML
12 INJECTION, SOLUTION INTRAVENOUS
Status: COMPLETED | OUTPATIENT
Start: 2025-02-24 | End: 2025-02-24

## 2025-02-24 RX ADMIN — FLUDEOXYGLUCOSE F 18 12 MILLICURIE: 200 INJECTION, SOLUTION INTRAVENOUS at 09:26

## 2025-02-25 ENCOUNTER — TELEPHONE (OUTPATIENT)
Dept: RADIATION ONCOLOGY | Facility: CLINIC | Age: 70
End: 2025-02-25
Payer: MEDICARE

## 2025-03-03 ASSESSMENT — ENCOUNTER SYMPTOMS
SORE THROAT: 1
SHORTNESS OF BREATH: 1

## 2025-03-04 ENCOUNTER — APPOINTMENT (OUTPATIENT)
Dept: SURGERY | Facility: CLINIC | Age: 70
End: 2025-03-04
Payer: MEDICARE

## 2025-03-04 NOTE — PROGRESS NOTES
History of Present Illness    Ingris Mckinney is a 69 y.o. female who is seen at the request of Dr. Noemy Rivas for an opinion regarding the possible surgical management of a supraglottic cancer.  This is a patient started having some issues with her voice back 2 or 3 years ago.  At the time she was found to have a left vocal cord paralysis.  There was no obvious cause.  She got injected.  More recently her voice got much worse and she was found to have a squamous cell carcinoma of the supraglottic larynx.  That happened to be on the same side as the paralysis.  I personally reviewed the CT scan of the neck which was done in February 2025 as well as a PET scan that was done afterwards that showed this lesion in the left supraglottic larynx.  The lesion is at least a T3 with some preepiglottic fat invasion.  I cannot appreciate any metastatic disease.      Past Medical History    The past medical history shows COPD, elevated cholesterol.  Her medications are documented in the chart.  She does not have any allergies to medications.  She does have allergy to contrast.  She has smoked a pack of cigarettes a day most of her life.  She has been retired for quite some time.  She is here today with her sister and brother-in-law.    Physical Exam    The patient is alert and oriented. Examination of the external ears, ear canals, and eardrums, is within normal limits. Examination of the anterior and external nose is negative. Examination of the oral cavity and oropharynx is normal. There is no evidence of any mucosal lesions. There is good mobility of the tongue and palate. There is good mandibular excursion. Palpation of the parotid, neck, and thyroid field fails to show any worrisome masses or adenopathies.    A flexible laryngoscopy was carried out. Under topical Xylocaine and Don-Synephrine the scope was introduced through the nostril. The nasopharynx, base of tongue, hypopharynx, and larynx are visualized.  There is an  exophytic lesion involving the left the supraglottic larynx.  The left vocal cord is immobile.      Assessment and Plan    Squamous cell carcinoma of the supraglottic larynx.  This lesion is at least a T3 lesion.  The options would be either surgery versus chemoradiation.  I would be very concerned about doing some partial laryngectomy on this patient given the vocal cord paralysis.  She would be likely to have a dysfunctional larynx.  I would prefer the option of chemoradiation.  I answered all their questions.  She is return to her radiation oncologist for further management.  She is to call me if she has any questions.    I will see her on a as needed basis.

## 2025-03-05 ENCOUNTER — APPOINTMENT (OUTPATIENT)
Dept: OTOLARYNGOLOGY | Facility: CLINIC | Age: 70
End: 2025-03-05
Payer: MEDICARE

## 2025-03-05 ENCOUNTER — TELEPHONE (OUTPATIENT)
Dept: RADIATION ONCOLOGY | Facility: CLINIC | Age: 70
End: 2025-03-05

## 2025-03-05 VITALS — WEIGHT: 78 LBS | BODY MASS INDEX: 13.82 KG/M2 | HEIGHT: 63 IN

## 2025-03-05 DIAGNOSIS — C32.1 PRIMARY SQUAMOUS CELL CARCINOMA OF SUPRAGLOTTIS (MULTI): ICD-10-CM

## 2025-03-05 PROCEDURE — 31575 DIAGNOSTIC LARYNGOSCOPY: CPT | Performed by: OTOLARYNGOLOGY

## 2025-03-05 PROCEDURE — 99204 OFFICE O/P NEW MOD 45 MIN: CPT | Performed by: OTOLARYNGOLOGY

## 2025-03-05 PROCEDURE — 1159F MED LIST DOCD IN RCRD: CPT | Performed by: OTOLARYNGOLOGY

## 2025-03-05 PROCEDURE — 1160F RVW MEDS BY RX/DR IN RCRD: CPT | Performed by: OTOLARYNGOLOGY

## 2025-03-05 PROCEDURE — 3008F BODY MASS INDEX DOCD: CPT | Performed by: OTOLARYNGOLOGY

## 2025-03-05 ASSESSMENT — PATIENT HEALTH QUESTIONNAIRE - PHQ9
1. LITTLE INTEREST OR PLEASURE IN DOING THINGS: NOT AT ALL
SUM OF ALL RESPONSES TO PHQ9 QUESTIONS 1 AND 2: 0
2. FEELING DOWN, DEPRESSED OR HOPELESS: NOT AT ALL

## 2025-03-05 NOTE — TELEPHONE ENCOUNTER
RN called patient's phone and spoke to her sister who says they are not interested in a feeding tube at this point as they believe she's eating enough. Sister/patient requested ct/simulation appointment, call transferred to  Junior to get that scheduled.

## 2025-03-07 ENCOUNTER — TELEPHONE (OUTPATIENT)
Dept: RADIATION ONCOLOGY | Facility: CLINIC | Age: 70
End: 2025-03-07
Payer: MEDICARE

## 2025-03-07 DIAGNOSIS — C32.1 PRIMARY SQUAMOUS CELL CARCINOMA OF SUPRAGLOTTIS (MULTI): ICD-10-CM

## 2025-03-07 NOTE — TELEPHONE ENCOUNTER
Telephone call completed by this RN for reminder call prior to CT/Sim. Provided education on arriving 30 minutes early. Left ARTUR.

## 2025-03-10 ENCOUNTER — HOSPITAL ENCOUNTER (OUTPATIENT)
Dept: RADIOLOGY | Facility: EXTERNAL LOCATION | Age: 70
Discharge: HOME | End: 2025-03-10

## 2025-03-10 ENCOUNTER — SOCIAL WORK (OUTPATIENT)
Dept: CASE MANAGEMENT | Facility: HOSPITAL | Age: 70
End: 2025-03-10
Payer: MEDICARE

## 2025-03-10 ENCOUNTER — HOSPITAL ENCOUNTER (OUTPATIENT)
Dept: RADIATION ONCOLOGY | Facility: CLINIC | Age: 70
Setting detail: RADIATION/ONCOLOGY SERIES
Discharge: HOME | End: 2025-03-10
Payer: MEDICARE

## 2025-03-10 VITALS
WEIGHT: 92.04 LBS | RESPIRATION RATE: 18 BRPM | HEART RATE: 118 BPM | BODY MASS INDEX: 16.3 KG/M2 | OXYGEN SATURATION: 92 % | TEMPERATURE: 97.3 F | DIASTOLIC BLOOD PRESSURE: 63 MMHG | SYSTOLIC BLOOD PRESSURE: 116 MMHG

## 2025-03-10 DIAGNOSIS — C32.1 MALIGNANT NEOPLASM OF SUPRAGLOTTIS (MULTI): ICD-10-CM

## 2025-03-10 PROCEDURE — 77470 SPECIAL RADIATION TREATMENT: CPT | Performed by: STUDENT IN AN ORGANIZED HEALTH CARE EDUCATION/TRAINING PROGRAM

## 2025-03-10 PROCEDURE — 77334 RADIATION TREATMENT AID(S): CPT | Performed by: STUDENT IN AN ORGANIZED HEALTH CARE EDUCATION/TRAINING PROGRAM

## 2025-03-10 PROCEDURE — 77290 THER RAD SIMULAJ FIELD CPLX: CPT | Performed by: STUDENT IN AN ORGANIZED HEALTH CARE EDUCATION/TRAINING PROGRAM

## 2025-03-10 ASSESSMENT — PAIN SCALES - GENERAL: PAINLEVEL_OUTOF10: 0-NO PAIN

## 2025-03-10 NOTE — PROGRESS NOTES
Education Documentation  Skin and Nail Changes, taught by Tamiko Mahoney RN at 3/10/2025 11:44 AM.  Learner: Patient  Readiness: Acceptance  Method: Explanation  Response: Verbalizes Understanding    Mouth Sores, taught by Tamiko Mhaoney RN at 3/10/2025 11:44 AM.  Learner: Patient  Readiness: Acceptance  Method: Explanation  Response: Verbalizes Understanding    Fatigue, taught by Tamiko Mahoney RN at 3/10/2025 11:44 AM.  Learner: Patient  Readiness: Acceptance  Method: Explanation  Response: Verbalizes Understanding    Radiation Therapy, taught by Tamiko Mahoney RN at 3/10/2025 11:44 AM.  Learner: Patient  Readiness: Acceptance  Method: Explanation  Response: Verbalizes Understanding    Treatment Plan and Schedule, taught by Tamiko Mahoney RN at 3/10/2025 11:44 AM.  Learner: Patient  Readiness: Acceptance  Method: Explanation  Response: Verbalizes Understanding    Radiation Therapy (External Beam) : What Is Radiation Therapy, taught by Tamiko Mahoney RN at 3/10/2025 11:44 AM.  Learner: Patient  Readiness: Acceptance  Method: Explanation  Response: Verbalizes Understanding    Radiation Therapy (External Beam) : Side Effects, taught by Tamiko Mahoney RN at 3/10/2025 11:44 AM.  Learner: Patient  Readiness: Acceptance  Method: Explanation  Response: Verbalizes Understanding    Radiation Therapy (External Beam) : Review, taught by Tamiko Mahoney RN at 3/10/2025 11:44 AM.  Learner: Patient  Readiness: Acceptance  Method: Explanation  Response: Verbalizes Understanding    Radiation Therapy (External Beam) : Life During Treatment, taught by Tamiko Mahoney RN at 3/10/2025 11:44 AM.  Learner: Patient  Readiness: Acceptance  Method: Explanation  Response: Verbalizes Understanding    Radiation Therapy (External Beam) : Getting Radiation, taught by Tamiko Mahoney RN at 3/10/2025 11:44 AM.  Learner: Patient  Readiness: Acceptance  Method: Explanation  Response: Verbalizes Understanding    Education Comments  03/10/25 1144 Tamiko  Denzel, RN  Patient here today for CT simulation. We reviewed what the Ct/sim will entail including but not limited to IV contrast, shoulder retractors, large mask, head pad, bite block, knee sponge. We also reviewed side effects of radiation including but not limited to sore throat, hard to swallow, hoarseness, skin irritation,  dry mouth and fatigue and ways to manage side effects. Patient asking appropriate questions. Patient verbalizes understanding with verbal teach back. Tamiko Mahoney MSN, RN, OCN

## 2025-03-10 NOTE — PROGRESS NOTES
SW spoke to patient today while she was here for an appt. SW had spoken to Pt after getting a rejection from [facility] [employer] program. She does live In [facility] but it is less than 100 yards from [facility] so we thought we would start here. They provided a number for [facility] that SW attempted to call. They asked for Pt information so SW called Pt to tell her that she would need to try to get thru to them. Pt does not recall that.   Today, SW provided that number to Pt again but also the number and print outs from [facility] so she would have that information in writing. Sw also provided the number for transportation thru [facility]. She and or family will make these calls.

## 2025-03-11 ENCOUNTER — PATIENT OUTREACH (OUTPATIENT)
Dept: HEMATOLOGY/ONCOLOGY | Facility: CLINIC | Age: 70
End: 2025-03-11
Payer: MEDICARE

## 2025-03-11 SDOH — ECONOMIC STABILITY: FOOD INSECURITY: WITHIN THE PAST 12 MONTHS, YOU WORRIED THAT YOUR FOOD WOULD RUN OUT BEFORE YOU GOT MONEY TO BUY MORE.: NEVER TRUE

## 2025-03-11 SDOH — ECONOMIC STABILITY: INCOME INSECURITY: IN THE LAST 12 MONTHS, WAS THERE A TIME WHEN YOU WERE NOT ABLE TO PAY THE MORTGAGE OR RENT ON TIME?: NO

## 2025-03-11 SDOH — ECONOMIC STABILITY: FOOD INSECURITY: WITHIN THE PAST 12 MONTHS, THE FOOD YOU BOUGHT JUST DIDN'T LAST AND YOU DIDN'T HAVE MONEY TO GET MORE.: NEVER TRUE

## 2025-03-11 ASSESSMENT — SOCIAL DETERMINANTS OF HEALTH (SDOH)
HOW OFTEN DO YOU GET TOGETHER WITH FRIENDS OR RELATIVES?: TWICE A WEEK
WITHIN THE LAST YEAR, HAVE YOU BEEN AFRAID OF YOUR PARTNER OR EX-PARTNER?: NO
HOW OFTEN DO YOU ATTEND CHURCH OR RELIGIOUS SERVICES?: MORE THAN 4 TIMES PER YEAR
WITHIN THE LAST YEAR, HAVE YOU BEEN HUMILIATED OR EMOTIONALLY ABUSED IN OTHER WAYS BY YOUR PARTNER OR EX-PARTNER?: NO
HOW OFTEN DO YOU ATTENT MEETINGS OF THE CLUB OR ORGANIZATION YOU BELONG TO?: 1 TO 4 TIMES PER YEAR
WITHIN THE LAST YEAR, HAVE TO BEEN RAPED OR FORCED TO HAVE ANY KIND OF SEXUAL ACTIVITY BY YOUR PARTNER OR EX-PARTNER?: NO
DO YOU BELONG TO ANY CLUBS OR ORGANIZATIONS SUCH AS CHURCH GROUPS UNIONS, FRATERNAL OR ATHLETIC GROUPS, OR SCHOOL GROUPS?: YES
IN A TYPICAL WEEK, HOW MANY TIMES DO YOU TALK ON THE PHONE WITH FAMILY, FRIENDS, OR NEIGHBORS?: NEVER
WITHIN THE LAST YEAR, HAVE YOU BEEN KICKED, HIT, SLAPPED, OR OTHERWISE PHYSICALLY HURT BY YOUR PARTNER OR EX-PARTNER?: NO
HOW HARD IS IT FOR YOU TO PAY FOR THE VERY BASICS LIKE FOOD, HOUSING, MEDICAL CARE, AND HEATING?: NOT VERY HARD
ARE YOU MARRIED, WIDOWED, DIVORCED, SEPARATED, NEVER MARRIED, OR LIVING WITH A PARTNER?: PATIENT DECLINED

## 2025-03-11 ASSESSMENT — LIFESTYLE VARIABLES
HOW OFTEN DO YOU HAVE A DRINK CONTAINING ALCOHOL: NEVER
AUDIT-C TOTAL SCORE: 0
HOW MANY STANDARD DRINKS CONTAINING ALCOHOL DO YOU HAVE ON A TYPICAL DAY: PATIENT DOES NOT DRINK
HOW OFTEN DO YOU HAVE SIX OR MORE DRINKS ON ONE OCCASION: NEVER
SKIP TO QUESTIONS 9-10: 1

## 2025-03-11 NOTE — PROGRESS NOTES
Subjective:   Patient Name: Ingris Mckinney    : 1955     MRN: [id number]     Age: 69 y.o.     Gender: female  Referring Provider: ARNAV    CC: Management of newly diagnosed L sided supraglottic squamous cell carcinoma ([id number])    Presenting History (3/13/2025): At time of initial presentation a 69 y.o. female, current smoker (started at age 19,1/3 pack per day) with a past medical history of osteoporosis, HLD, COPD (occasionally on O2) referred for management of suprglottic cancer.      She has been following with ENT for history of vocal cord paralysis without CT abnormality since , which was successfully managed with injection medialization.     She then developed acute worsening of her voice in 2024, with scope exam shortly after that showed mucosal irregularity along the anterior left false cord. The left true vocal cord was fixed in the paramedian position. The right true vocal cord exhibited normal movement. She was taken to the operating room for direct and biopsy on 2025. Operative findings showed a left false cord mass lesion which was not obstructive, and extended from anterior to posterior. The mass extended to the laryngeal ventricle on the left, but spared the true vocal cord. The mass did not appear to cross to the right. The vallecula, base of tongue and hypopharynx were normal. Biopsies from the left supraglottic mass were positive for invasive moderately differentiated squamous cell carcinoma.     CT neck on 2025 showed an enhancing mass in the left supraglottic larynx, 1.0 x 1.7 x 1.4 cm in size, which approached the epiglottis without definitive invasion. There was an area of irregularity/thickening on the right true vocal cord of unclear etiology.     2025: PET/CT: 1.  Focal intense hypermetabolic activity at the left aryepiglottic fold compatible with biopsy-proven squamous cell carcinoma. 2. No evidence of hypermetabolic kevin or distant metastatic disease.3.  Mild hypermetabolic activity associated with the right upper lobe pulmonary nodule. Continued attention on follow-up chest CT is recommended.    She was seen by ENT Dr. Lo and unfortunately was deemed not surgically resectable. She presented to [facility] for a consultation accompanied by her brother in law. She livers on her own. She states that she has been lazy over the past months and has lost a lot of weight. She doesn't really eat much food other than cereals. Luckily her sister and in-law brought her dinner. She has gained 12 lb in the past weeks. She denies any dysphagia or odynophagia. NO pain. NO numbness or tingling. No heating issues. No fever or chills. No n/v/d/c    Shx: No etoh or illicit drugs.   2 sisters   No kids      Treatment Summary:  - N/A    Interval History (3/13/2025):  As above.  Initial Consultation.      ROS:  Patient denies the below review of systems, except for changes as noted in the interval history.    General:  Fatigue, anorexia, fevers, sweats, chills, heat/cold intolerance, weight changes.  HEENT:  Icterus, congestion, sore throat, rhinorrhea, taste change, voice changes.  Neurology:  Headache, dizziness, vision changes, hearing changes, other motor/sensory loss, gait instability, neuropathies.  Pulmonology:  Cough, wheezing, hemoptysis, dyspnea at rest, dyspnea on exertion, pleuritic chest pain.  Cardiology:  Chest pain, palpitations, orthopnea, paroxysmal nocturnal dyspnea.  Gastroenterology:  Nausea, vomiting, reflux symptoms, abdominal pain, constipation, diarrhea, melena, bright red blood per rectum.  Genitourinary:  Dysuria, polyuria, urine color change, urine smell change, hematuria.   Musculoskeletal:  Arthralgias, myalgias, joint swelling, focal weakness.  Skin:  Rash, pruritis, jaundice, petechiae, nail changes, skin nodules/growth.  Psychology:  Anxiety, depression, suicidal ideation, homicidal ideation.  Heme:  Easy bleeding or bruising.  Others:   All other systems  reviewed and are negative.    Medical History:   Past Medical History:   Diagnosis Date    Anxiety     At risk for falling     COPD (chronic obstructive pulmonary disease) (Multi)     Hypercholesteremia     Hypoxia     Larynx cancer (Multi)     Malnutrition (Multi)     Personal history of other diseases of the respiratory system     History of chronic obstructive lung disease    Respiratory failure (Multi)     Tachycardia     Weakness        Surgical History:   Past Surgical History:   Procedure Laterality Date    CT GUIDED IMAGING FOR NEEDLE PLACEMENT  05/05/2020    CT GUIDED IMAGING FOR NEEDLE PLACEMENT [facility]    LARYNGOSCOPY      direct w/ bx    OTHER SURGICAL HISTORY  11/11/2022    Hip surgery    OTHER SURGICAL HISTORY  11/11/2022    Arm surgery    OTHER SURGICAL HISTORY  11/11/2022    Leg surgery       Family History:  Family History   Problem Relation Name Age of Onset    Other (chronic lung disease) Other         Allergies:  Allergies   Allergen Reactions    Scallops Anaphylaxis    Iodinated Contrast Media Itching       Meds (Current):    Current Outpatient Medications:     albuterol 2.5 mg /3 mL (0.083 %) nebulizer solution, Take 3 mL (2.5 mg) by nebulization every 4 hours if needed for wheezing., Disp: , Rfl:     albuterol 90 mcg/actuation inhaler, Inhale 2 puffs every 4 hours if needed., Disp: , Rfl:     alendronate (Fosamax) 70 mg tablet, Take 1 tablet (70 mg) by mouth every 7 days. Take in the morning with a full glass of water, on an empty stomach, and do not take anything else by mouth or lie down for the next 30 min., Disp: 12 tablet, Rfl: 3    Dulera 200-5 mcg/actuation inhaler, Inhale 2 puffs twice a day., Disp: , Rfl:     rosuvastatin (Crestor) 10 mg tablet, Take 1 tablet (10 mg) by mouth once daily., Disp: 90 tablet, Rfl: 2    umeclidinium (Incruse Ellipta) 62.5 mcg/actuation inhalation, Inhale 1 puff (62.5 mcg) once daily., Disp: , Rfl:     dexAMETHasone (Decadron) 4 mg tablet, Take  "2 tablets (8 mg) by mouth once daily. For 3 days per week starting the day after treatment., Disp: 42 tablet, Rfl: 0    OLANZapine (ZyPREXA) 5 mg tablet, Take 1 tablet (5 mg) by mouth once daily at bedtime., Disp: 30 tablet, Rfl: 1    ondansetron (Zofran) 8 mg tablet, Take 1 tablet (8 mg) by mouth every 8 hours if needed for nausea or vomiting., Disp: 30 tablet, Rfl: 5    prochlorperazine (Compazine) 10 mg tablet, Take 1 tablet (10 mg) by mouth every 6 hours if needed for nausea or vomiting., Disp: 30 tablet, Rfl: 5    Pain Assessment:  Pain is: 0    Performance Status (ECOG): 2      ECOG Definition  0 Fully active; no performance restrictions.  1 Strenuous physical activity restricted; fully ambulatory and able to carry out light work.  2 Capable of all self-care but unable to carry out any work activities. Up and about >50% of waking hours.  3 Capable of only limited self-care; confined to bed or chair >50% of waking hours.  4 Completely disabled; cannot carry out any self-care; totally confined to bed or chair.  Exam:  BP 95/66 (BP Location: Left arm, Patient Position: Sitting, BP Cuff Size: Adult long)   Pulse 78   Temp 36.7 °C (98.1 °F) (Temporal)   Resp 16   Ht 1.57 m (5' 1.81\")   Wt (!) 40.9 kg (90 lb 2.7 oz)   SpO2 96%   BMI 16.59 kg/m²   General: Well appearing, no acute distress. Very skinny lady. Voice is hoarse.  Neurology: Alert and oriented x3, CN II-XII grossly intact  Psychology: Affect appropriate  Eyes: PERRL, EOMI  ENT: Mucus membranes moist and intact, no ulcers  Lymphatics: No palpable adenopathy  Neck: Supple, no masses  Respiratory: Lungs clear bilaterally, no wheezes, no rales, no rhonchi  Cardiovascular: Normal rate and rhythm, no murmur  Abdomen: Soft, non-tender, non-distended, normoactive, no hepatosplenomegaly, no ascites  Musculoskeletal: Normal gait and stance  Extremities: No clubbing, no cyanosis, no edema  Skin: No rash  Genitourinary: Deferred  Rectal: Deferred   Pelvic: " Deferred  Breasts: Deferred    Labs:                Assessment/Plan:   69 y.o. female with past medical history as stated referred for management of supraglottic squamous cell carcinoma.    The patient's records and imaging have been reviewed in detail.  I have discussed with the patient the natural history of their disease at length.  The following has also been discussed with the patient:    # Cancer:   L sided supraglottic squamous cell carcinoma ([id number]). Given that she is not surgically resectable. I thus recommend treatment of weekly cisplatin with concurrent RT for 7 weeks. Side effects of chenotherapy including but not limited to nausea, vomiting, diarrhea, anemia, thrombocytopenia,  leukopenia, neutropenic fever, kidney toxicities, liver toxicities, rash, infusion related reaction, secondary malignancies MDS or AML, and fertility impact, neuropathy and hearing impact. Consent signed. To start next week.     - Interval Imagin2025: PET/CT: 1.  Focal intense hypermetabolic activity at the left aryepiglottic fold compatible with biopsy-proven squamous cell carcinoma. 2. No evidence of hypermetabolic kevin or distant metastatic disease.3. Mild hypermetabolic activity associated with the right upper lobe pulmonary nodule. Continued attention on follow-up chest CT is recommended.    # Pulmonary nodule: will continue to follow.    # Clinical Trials:  None currently.     # Access: Peripheral veins.    # Pain: No acute pain.    # Bone Health: No signs of bony disease.     # Psychosocial: Coping well, no acute issues.  Good support from family & friends.  Social work support offered.    # Hearing: NO baseline hearing issues.    # Speech and swallow: We discussed the possibility of PEG Tube placement if nutritional goal is not met and significant weight loss.    # GI/CINV: severe malnutrition. Nutrition consult offered.    # Smoking: N/A, current smoker, working on quitting.    # Fertility: N/A.   Oncofertility support available as needed.      # Heme/ESAs: N/A.    # GOC: Patient is aware of curative intent goals of care.    # Language: English.    # Interdisciplinary Patient/Family Education Record:  Learner:  Patient.  Learning Needs Reviewed:  Treatments, Medications, Disease Process, Diagnostic Tests.  Barriers: None.  Teaching Method: Discussion, Handout(s).  Comprehension:  Verbalized Understanding.  Follow-up Plan:  Return to office as per MD.    # Dispo: RTC in 1 week to start.

## 2025-03-11 NOTE — PROGRESS NOTES
Patient is scheduled for her first visit with Dr Waters on Thursday 3/13/25. Review of record does not reveal any further out reach referral needs at this time.

## 2025-03-12 ENCOUNTER — APPOINTMENT (OUTPATIENT)
Dept: HEMATOLOGY/ONCOLOGY | Facility: HOSPITAL | Age: 70
End: 2025-03-12
Payer: MEDICARE

## 2025-03-13 ENCOUNTER — OFFICE VISIT (OUTPATIENT)
Dept: HEMATOLOGY/ONCOLOGY | Facility: CLINIC | Age: 70
End: 2025-03-13
Payer: MEDICARE

## 2025-03-13 DIAGNOSIS — C32.1 PRIMARY SQUAMOUS CELL CARCINOMA OF SUPRAGLOTTIS (MULTI): Primary | ICD-10-CM

## 2025-03-13 DIAGNOSIS — E43 SEVERE PROTEIN-CALORIE MALNUTRITION (MULTI): ICD-10-CM

## 2025-03-13 PROCEDURE — 99205 OFFICE O/P NEW HI 60 MIN: CPT | Performed by: STUDENT IN AN ORGANIZED HEALTH CARE EDUCATION/TRAINING PROGRAM

## 2025-03-13 PROCEDURE — G2211 COMPLEX E/M VISIT ADD ON: HCPCS | Performed by: STUDENT IN AN ORGANIZED HEALTH CARE EDUCATION/TRAINING PROGRAM

## 2025-03-13 PROCEDURE — 3078F DIAST BP <80 MM HG: CPT | Performed by: STUDENT IN AN ORGANIZED HEALTH CARE EDUCATION/TRAINING PROGRAM

## 2025-03-13 PROCEDURE — 1126F AMNT PAIN NOTED NONE PRSNT: CPT | Performed by: STUDENT IN AN ORGANIZED HEALTH CARE EDUCATION/TRAINING PROGRAM

## 2025-03-13 PROCEDURE — 1159F MED LIST DOCD IN RCRD: CPT | Performed by: STUDENT IN AN ORGANIZED HEALTH CARE EDUCATION/TRAINING PROGRAM

## 2025-03-13 PROCEDURE — 99215 OFFICE O/P EST HI 40 MIN: CPT | Performed by: STUDENT IN AN ORGANIZED HEALTH CARE EDUCATION/TRAINING PROGRAM

## 2025-03-13 PROCEDURE — 3008F BODY MASS INDEX DOCD: CPT | Performed by: STUDENT IN AN ORGANIZED HEALTH CARE EDUCATION/TRAINING PROGRAM

## 2025-03-13 PROCEDURE — 3074F SYST BP LT 130 MM HG: CPT | Performed by: STUDENT IN AN ORGANIZED HEALTH CARE EDUCATION/TRAINING PROGRAM

## 2025-03-13 RX ORDER — DIPHENHYDRAMINE HYDROCHLORIDE 50 MG/ML
50 INJECTION INTRAMUSCULAR; INTRAVENOUS AS NEEDED
OUTPATIENT
Start: 2025-03-17

## 2025-03-13 RX ORDER — FAMOTIDINE 10 MG/ML
20 INJECTION, SOLUTION INTRAVENOUS ONCE AS NEEDED
OUTPATIENT
Start: 2025-03-17

## 2025-03-13 RX ORDER — DEXAMETHASONE 4 MG/1
8 TABLET ORAL DAILY
Qty: 42 TABLET | Refills: 0 | Status: SHIPPED | OUTPATIENT
Start: 2025-03-13

## 2025-03-13 RX ORDER — EPINEPHRINE 0.3 MG/.3ML
0.3 INJECTION SUBCUTANEOUS EVERY 5 MIN PRN
OUTPATIENT
Start: 2025-03-17

## 2025-03-13 RX ORDER — DIPHENHYDRAMINE HYDROCHLORIDE 50 MG/ML
50 INJECTION INTRAMUSCULAR; INTRAVENOUS AS NEEDED
OUTPATIENT
Start: 2025-03-31

## 2025-03-13 RX ORDER — ALBUTEROL SULFATE 0.83 MG/ML
3 SOLUTION RESPIRATORY (INHALATION) AS NEEDED
OUTPATIENT
Start: 2025-03-17

## 2025-03-13 RX ORDER — EPINEPHRINE 0.3 MG/.3ML
0.3 INJECTION SUBCUTANEOUS EVERY 5 MIN PRN
OUTPATIENT
Start: 2025-04-21

## 2025-03-13 RX ORDER — ONDANSETRON HYDROCHLORIDE 8 MG/1
8 TABLET, FILM COATED ORAL EVERY 8 HOURS PRN
Qty: 30 TABLET | Refills: 5 | Status: SHIPPED | OUTPATIENT
Start: 2025-03-13

## 2025-03-13 RX ORDER — PALONOSETRON 0.05 MG/ML
0.25 INJECTION, SOLUTION INTRAVENOUS ONCE
OUTPATIENT
Start: 2025-04-07

## 2025-03-13 RX ORDER — PROCHLORPERAZINE EDISYLATE 5 MG/ML
10 INJECTION INTRAMUSCULAR; INTRAVENOUS EVERY 6 HOURS PRN
OUTPATIENT
Start: 2025-03-17

## 2025-03-13 RX ORDER — PALONOSETRON 0.05 MG/ML
0.25 INJECTION, SOLUTION INTRAVENOUS ONCE
OUTPATIENT
Start: 2025-04-28

## 2025-03-13 RX ORDER — PROCHLORPERAZINE EDISYLATE 5 MG/ML
10 INJECTION INTRAMUSCULAR; INTRAVENOUS EVERY 6 HOURS PRN
OUTPATIENT
Start: 2025-04-28

## 2025-03-13 RX ORDER — EPINEPHRINE 0.3 MG/.3ML
0.3 INJECTION SUBCUTANEOUS EVERY 5 MIN PRN
OUTPATIENT
Start: 2025-04-28

## 2025-03-13 RX ORDER — DIPHENHYDRAMINE HYDROCHLORIDE 50 MG/ML
50 INJECTION INTRAMUSCULAR; INTRAVENOUS AS NEEDED
OUTPATIENT
Start: 2025-04-14

## 2025-03-13 RX ORDER — PROCHLORPERAZINE MALEATE 10 MG
10 TABLET ORAL EVERY 6 HOURS PRN
OUTPATIENT
Start: 2025-04-14

## 2025-03-13 RX ORDER — EPINEPHRINE 0.3 MG/.3ML
0.3 INJECTION SUBCUTANEOUS EVERY 5 MIN PRN
OUTPATIENT
Start: 2025-03-31

## 2025-03-13 RX ORDER — EPINEPHRINE 0.3 MG/.3ML
0.3 INJECTION SUBCUTANEOUS EVERY 5 MIN PRN
OUTPATIENT
Start: 2025-04-07

## 2025-03-13 RX ORDER — PROCHLORPERAZINE EDISYLATE 5 MG/ML
10 INJECTION INTRAMUSCULAR; INTRAVENOUS EVERY 6 HOURS PRN
OUTPATIENT
Start: 2025-03-24

## 2025-03-13 RX ORDER — FAMOTIDINE 10 MG/ML
20 INJECTION, SOLUTION INTRAVENOUS ONCE AS NEEDED
OUTPATIENT
Start: 2025-04-28

## 2025-03-13 RX ORDER — PROCHLORPERAZINE MALEATE 10 MG
10 TABLET ORAL EVERY 6 HOURS PRN
OUTPATIENT
Start: 2025-03-31

## 2025-03-13 RX ORDER — HEPARIN 100 UNIT/ML
500 SYRINGE INTRAVENOUS AS NEEDED
OUTPATIENT
Start: 2025-03-17

## 2025-03-13 RX ORDER — PROCHLORPERAZINE EDISYLATE 5 MG/ML
10 INJECTION INTRAMUSCULAR; INTRAVENOUS EVERY 6 HOURS PRN
OUTPATIENT
Start: 2025-04-14

## 2025-03-13 RX ORDER — FAMOTIDINE 10 MG/ML
20 INJECTION, SOLUTION INTRAVENOUS ONCE AS NEEDED
OUTPATIENT
Start: 2025-03-31

## 2025-03-13 RX ORDER — PALONOSETRON 0.05 MG/ML
0.25 INJECTION, SOLUTION INTRAVENOUS ONCE
OUTPATIENT
Start: 2025-03-24

## 2025-03-13 RX ORDER — ALBUTEROL SULFATE 0.83 MG/ML
3 SOLUTION RESPIRATORY (INHALATION) AS NEEDED
OUTPATIENT
Start: 2025-04-28

## 2025-03-13 RX ORDER — PROCHLORPERAZINE MALEATE 10 MG
10 TABLET ORAL EVERY 6 HOURS PRN
OUTPATIENT
Start: 2025-04-21

## 2025-03-13 RX ORDER — PROCHLORPERAZINE EDISYLATE 5 MG/ML
10 INJECTION INTRAMUSCULAR; INTRAVENOUS EVERY 6 HOURS PRN
OUTPATIENT
Start: 2025-03-31

## 2025-03-13 RX ORDER — PROCHLORPERAZINE MALEATE 10 MG
10 TABLET ORAL EVERY 6 HOURS PRN
Qty: 30 TABLET | Refills: 5 | Status: SHIPPED | OUTPATIENT
Start: 2025-03-13

## 2025-03-13 RX ORDER — ALBUTEROL SULFATE 0.83 MG/ML
3 SOLUTION RESPIRATORY (INHALATION) AS NEEDED
OUTPATIENT
Start: 2025-03-31

## 2025-03-13 RX ORDER — HEPARIN SODIUM,PORCINE/PF 10 UNIT/ML
50 SYRINGE (ML) INTRAVENOUS AS NEEDED
OUTPATIENT
Start: 2025-03-17

## 2025-03-13 RX ORDER — DIPHENHYDRAMINE HYDROCHLORIDE 50 MG/ML
50 INJECTION INTRAMUSCULAR; INTRAVENOUS AS NEEDED
OUTPATIENT
Start: 2025-04-28

## 2025-03-13 RX ORDER — PROCHLORPERAZINE MALEATE 10 MG
10 TABLET ORAL EVERY 6 HOURS PRN
OUTPATIENT
Start: 2025-04-28

## 2025-03-13 RX ORDER — PALONOSETRON 0.05 MG/ML
0.25 INJECTION, SOLUTION INTRAVENOUS ONCE
OUTPATIENT
Start: 2025-03-17

## 2025-03-13 RX ORDER — ALBUTEROL SULFATE 0.83 MG/ML
3 SOLUTION RESPIRATORY (INHALATION) AS NEEDED
OUTPATIENT
Start: 2025-04-07

## 2025-03-13 RX ORDER — PROCHLORPERAZINE EDISYLATE 5 MG/ML
10 INJECTION INTRAMUSCULAR; INTRAVENOUS EVERY 6 HOURS PRN
OUTPATIENT
Start: 2025-04-21

## 2025-03-13 RX ORDER — ALBUTEROL SULFATE 0.83 MG/ML
3 SOLUTION RESPIRATORY (INHALATION) AS NEEDED
OUTPATIENT
Start: 2025-03-24

## 2025-03-13 RX ORDER — EPINEPHRINE 0.3 MG/.3ML
0.3 INJECTION SUBCUTANEOUS EVERY 5 MIN PRN
OUTPATIENT
Start: 2025-03-24

## 2025-03-13 RX ORDER — FAMOTIDINE 10 MG/ML
20 INJECTION, SOLUTION INTRAVENOUS ONCE AS NEEDED
OUTPATIENT
Start: 2025-03-24

## 2025-03-13 RX ORDER — ALBUTEROL SULFATE 0.83 MG/ML
3 SOLUTION RESPIRATORY (INHALATION) AS NEEDED
OUTPATIENT
Start: 2025-04-14

## 2025-03-13 RX ORDER — DIPHENHYDRAMINE HYDROCHLORIDE 50 MG/ML
50 INJECTION INTRAMUSCULAR; INTRAVENOUS AS NEEDED
OUTPATIENT
Start: 2025-04-21

## 2025-03-13 RX ORDER — PROCHLORPERAZINE MALEATE 10 MG
10 TABLET ORAL EVERY 6 HOURS PRN
OUTPATIENT
Start: 2025-04-07

## 2025-03-13 RX ORDER — PALONOSETRON 0.05 MG/ML
0.25 INJECTION, SOLUTION INTRAVENOUS ONCE
OUTPATIENT
Start: 2025-04-14

## 2025-03-13 RX ORDER — PROCHLORPERAZINE EDISYLATE 5 MG/ML
10 INJECTION INTRAMUSCULAR; INTRAVENOUS EVERY 6 HOURS PRN
OUTPATIENT
Start: 2025-04-07

## 2025-03-13 RX ORDER — EPINEPHRINE 0.3 MG/.3ML
0.3 INJECTION SUBCUTANEOUS EVERY 5 MIN PRN
OUTPATIENT
Start: 2025-04-14

## 2025-03-13 RX ORDER — PALONOSETRON 0.05 MG/ML
0.25 INJECTION, SOLUTION INTRAVENOUS ONCE
OUTPATIENT
Start: 2025-04-21

## 2025-03-13 RX ORDER — PALONOSETRON 0.05 MG/ML
0.25 INJECTION, SOLUTION INTRAVENOUS ONCE
OUTPATIENT
Start: 2025-03-31

## 2025-03-13 RX ORDER — PROCHLORPERAZINE MALEATE 10 MG
10 TABLET ORAL EVERY 6 HOURS PRN
OUTPATIENT
Start: 2025-03-17

## 2025-03-13 RX ORDER — PROCHLORPERAZINE MALEATE 10 MG
10 TABLET ORAL EVERY 6 HOURS PRN
OUTPATIENT
Start: 2025-03-24

## 2025-03-13 RX ORDER — OLANZAPINE 5 MG/1
5 TABLET ORAL NIGHTLY
Qty: 30 TABLET | Refills: 1 | Status: SHIPPED | OUTPATIENT
Start: 2025-03-13

## 2025-03-13 RX ORDER — DIPHENHYDRAMINE HYDROCHLORIDE 50 MG/ML
50 INJECTION INTRAMUSCULAR; INTRAVENOUS AS NEEDED
OUTPATIENT
Start: 2025-04-07

## 2025-03-13 RX ORDER — DIPHENHYDRAMINE HYDROCHLORIDE 50 MG/ML
50 INJECTION INTRAMUSCULAR; INTRAVENOUS AS NEEDED
OUTPATIENT
Start: 2025-03-24

## 2025-03-13 RX ORDER — FAMOTIDINE 10 MG/ML
20 INJECTION, SOLUTION INTRAVENOUS ONCE AS NEEDED
OUTPATIENT
Start: 2025-04-21

## 2025-03-13 RX ORDER — ALBUTEROL SULFATE 0.83 MG/ML
3 SOLUTION RESPIRATORY (INHALATION) AS NEEDED
OUTPATIENT
Start: 2025-04-21

## 2025-03-13 RX ORDER — FAMOTIDINE 10 MG/ML
20 INJECTION, SOLUTION INTRAVENOUS ONCE AS NEEDED
OUTPATIENT
Start: 2025-04-14

## 2025-03-13 RX ORDER — FAMOTIDINE 10 MG/ML
20 INJECTION, SOLUTION INTRAVENOUS ONCE AS NEEDED
OUTPATIENT
Start: 2025-04-07

## 2025-03-13 ASSESSMENT — PAIN SCALES - GENERAL: PAINLEVEL_OUTOF10: 0-NO PAIN

## 2025-03-13 NOTE — PROGRESS NOTES
New Patient visit here for follow up Left superglottis mass. Keritinizing squamous cell . Here to discuss potential treatment options.     PET Completed 2/10/25  ENT Dr Antony as well as Dr Bradford  Referral from Dr Anand, CT SIM 3/10/25        Patient here with her brother in law.     Medications and Allergies reviewed and reconciled this visit.    Follow up per MD request.    Patient reports availability and use of mychart, Reviewed this is a good place to communicate with the team as well as review labs and upcoming orders.      No barriers to education noted, patient agrees to current plan and verbalized understanding using teach back method.

## 2025-03-14 ENCOUNTER — HOSPITAL ENCOUNTER (OUTPATIENT)
Dept: RADIATION ONCOLOGY | Facility: HOSPITAL | Age: 70
Setting detail: RADIATION/ONCOLOGY SERIES
Discharge: HOME | End: 2025-03-14
Payer: MEDICARE

## 2025-03-14 ENCOUNTER — TELEPHONE (OUTPATIENT)
Dept: HEMATOLOGY/ONCOLOGY | Facility: CLINIC | Age: 70
End: 2025-03-14
Payer: MEDICARE

## 2025-03-14 ENCOUNTER — NUTRITION (OUTPATIENT)
Dept: HEMATOLOGY/ONCOLOGY | Facility: CLINIC | Age: 70
End: 2025-03-14
Payer: MEDICARE

## 2025-03-14 VITALS
OXYGEN SATURATION: 96 % | WEIGHT: 90.17 LBS | DIASTOLIC BLOOD PRESSURE: 66 MMHG | TEMPERATURE: 98.1 F | HEIGHT: 62 IN | HEART RATE: 78 BPM | SYSTOLIC BLOOD PRESSURE: 95 MMHG | BODY MASS INDEX: 16.59 KG/M2 | RESPIRATION RATE: 16 BRPM

## 2025-03-14 PROCEDURE — 77338 DESIGN MLC DEVICE FOR IMRT: CPT | Performed by: STUDENT IN AN ORGANIZED HEALTH CARE EDUCATION/TRAINING PROGRAM

## 2025-03-14 PROCEDURE — 77301 RADIOTHERAPY DOSE PLAN IMRT: CPT | Performed by: STUDENT IN AN ORGANIZED HEALTH CARE EDUCATION/TRAINING PROGRAM

## 2025-03-14 PROCEDURE — 77300 RADIATION THERAPY DOSE PLAN: CPT | Performed by: STUDENT IN AN ORGANIZED HEALTH CARE EDUCATION/TRAINING PROGRAM

## 2025-03-14 RX ORDER — DEXAMETHASONE 6 MG/1
12 TABLET ORAL ONCE
OUTPATIENT
Start: 2025-03-31

## 2025-03-14 RX ORDER — DEXAMETHASONE 6 MG/1
12 TABLET ORAL ONCE
OUTPATIENT
Start: 2025-04-07

## 2025-03-14 RX ORDER — DEXAMETHASONE 6 MG/1
12 TABLET ORAL ONCE
OUTPATIENT
Start: 2025-03-24
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
## 2025-03-14 NOTE — TELEPHONE ENCOUNTER
Returned call to Ingris. We discussed treatment schedule as she is attempting to get transportation set up for next week. We reviewed her current schedule for Chemo. She will be here for Labs and a visit with Michale Fregoso NP. Tuesday she is scheduled for Chemo with Cisplatin and Thursday she is scheduled for Hydration. I do not yet see the Radiation schedule at this time but the plan is for concurrent treatment.     She needs to give her transportation a 48 hour notice for transport and needs the dates and times to schedule that.    This nurse will reach out to the Radiation team for the schedule to assist Ingris with this.     Radiation will call Ingris when the schedule is finalized. They were made awe of her transportation needs.    Ingris verbalized understanding of the treatment plan re chemo and hydration.

## 2025-03-17 ENCOUNTER — SOCIAL WORK (OUTPATIENT)
Dept: CASE MANAGEMENT | Facility: HOSPITAL | Age: 70
End: 2025-03-17

## 2025-03-17 ENCOUNTER — OFFICE VISIT (OUTPATIENT)
Dept: HEMATOLOGY/ONCOLOGY | Facility: CLINIC | Age: 70
End: 2025-03-17
Payer: MEDICARE

## 2025-03-17 ENCOUNTER — LAB (OUTPATIENT)
Dept: LAB | Facility: CLINIC | Age: 70
End: 2025-03-17
Payer: MEDICARE

## 2025-03-17 VITALS
OXYGEN SATURATION: 93 % | RESPIRATION RATE: 16 BRPM | SYSTOLIC BLOOD PRESSURE: 127 MMHG | BODY MASS INDEX: 17.12 KG/M2 | WEIGHT: 93.03 LBS | TEMPERATURE: 97 F | DIASTOLIC BLOOD PRESSURE: 71 MMHG | HEART RATE: 110 BPM

## 2025-03-17 DIAGNOSIS — C32.1 PRIMARY SQUAMOUS CELL CARCINOMA OF SUPRAGLOTTIS (MULTI): Primary | ICD-10-CM

## 2025-03-17 DIAGNOSIS — C32.1 PRIMARY SQUAMOUS CELL CARCINOMA OF SUPRAGLOTTIS (MULTI): ICD-10-CM

## 2025-03-17 DIAGNOSIS — J44.9 CHRONIC OBSTRUCTIVE PULMONARY DISEASE, UNSPECIFIED COPD TYPE (MULTI): ICD-10-CM

## 2025-03-17 LAB
ALBUMIN SERPL BCP-MCNC: 4.1 G/DL (ref 3.4–5)
ALP SERPL-CCNC: 42 U/L (ref 33–136)
ALT SERPL W P-5'-P-CCNC: 24 U/L (ref 7–45)
ANION GAP SERPL CALC-SCNC: 13 MMOL/L (ref 10–20)
AST SERPL W P-5'-P-CCNC: 26 U/L (ref 9–39)
BASOPHILS # BLD AUTO: 0.03 X10*3/UL (ref 0–0.1)
BASOPHILS NFR BLD AUTO: 0.5 %
BILIRUB SERPL-MCNC: 0.3 MG/DL (ref 0–1.2)
BUN SERPL-MCNC: 25 MG/DL (ref 6–23)
CALCIUM SERPL-MCNC: 9.2 MG/DL (ref 8.6–10.3)
CHLORIDE SERPL-SCNC: 103 MMOL/L (ref 98–107)
CO2 SERPL-SCNC: 28 MMOL/L (ref 21–32)
CREAT SERPL-MCNC: 0.42 MG/DL (ref 0.5–1.05)
EGFRCR SERPLBLD CKD-EPI 2021: >90 ML/MIN/1.73M*2
EOSINOPHIL # BLD AUTO: 0.14 X10*3/UL (ref 0–0.7)
EOSINOPHIL NFR BLD AUTO: 2.5 %
ERYTHROCYTE [DISTWIDTH] IN BLOOD BY AUTOMATED COUNT: 13.4 % (ref 11.5–14.5)
GLUCOSE SERPL-MCNC: 121 MG/DL (ref 74–99)
HBV CORE AB SER QL: NONREACTIVE
HBV SURFACE AB SER-ACNC: <3.1 MIU/ML
HBV SURFACE AG SERPL QL IA: NONREACTIVE
HCT VFR BLD AUTO: 38.2 % (ref 36–46)
HGB BLD-MCNC: 12.3 G/DL (ref 12–16)
IMM GRANULOCYTES # BLD AUTO: 0 X10*3/UL (ref 0–0.7)
IMM GRANULOCYTES NFR BLD AUTO: 0 % (ref 0–0.9)
LYMPHOCYTES # BLD AUTO: 1.13 X10*3/UL (ref 1.2–4.8)
LYMPHOCYTES NFR BLD AUTO: 20.3 %
MAGNESIUM SERPL-MCNC: 1.83 MG/DL (ref 1.6–2.4)
MCH RBC QN AUTO: 31.7 PG (ref 26–34)
MCHC RBC AUTO-ENTMCNC: 32.2 G/DL (ref 32–36)
MCV RBC AUTO: 99 FL (ref 80–100)
MONOCYTES # BLD AUTO: 0.4 X10*3/UL (ref 0.1–1)
MONOCYTES NFR BLD AUTO: 7.2 %
NEUTROPHILS # BLD AUTO: 3.86 X10*3/UL (ref 1.2–7.7)
NEUTROPHILS NFR BLD AUTO: 69.5 %
NRBC BLD-RTO: 0 /100 WBCS (ref 0–0)
PLATELET # BLD AUTO: 168 X10*3/UL (ref 150–450)
POTASSIUM SERPL-SCNC: 3.9 MMOL/L (ref 3.5–5.3)
PROT SERPL-MCNC: 6.8 G/DL (ref 6.4–8.2)
RBC # BLD AUTO: 3.88 X10*6/UL (ref 4–5.2)
SODIUM SERPL-SCNC: 140 MMOL/L (ref 136–145)
T4 FREE SERPL-MCNC: 1.06 NG/DL (ref 0.78–1.48)
TSH SERPL-ACNC: 4.09 MIU/L (ref 0.44–3.98)
WBC # BLD AUTO: 5.6 X10*3/UL (ref 4.4–11.3)

## 2025-03-17 PROCEDURE — 99214 OFFICE O/P EST MOD 30 MIN: CPT | Performed by: NURSE PRACTITIONER

## 2025-03-17 PROCEDURE — 3074F SYST BP LT 130 MM HG: CPT | Performed by: NURSE PRACTITIONER

## 2025-03-17 PROCEDURE — 1159F MED LIST DOCD IN RCRD: CPT | Performed by: NURSE PRACTITIONER

## 2025-03-17 PROCEDURE — 87340 HEPATITIS B SURFACE AG IA: CPT

## 2025-03-17 PROCEDURE — 86706 HEP B SURFACE ANTIBODY: CPT

## 2025-03-17 PROCEDURE — 86704 HEP B CORE ANTIBODY TOTAL: CPT

## 2025-03-17 PROCEDURE — 1160F RVW MEDS BY RX/DR IN RCRD: CPT | Performed by: NURSE PRACTITIONER

## 2025-03-17 PROCEDURE — 84439 ASSAY OF FREE THYROXINE: CPT

## 2025-03-17 PROCEDURE — 36415 COLL VENOUS BLD VENIPUNCTURE: CPT

## 2025-03-17 PROCEDURE — 80053 COMPREHEN METABOLIC PANEL: CPT

## 2025-03-17 PROCEDURE — 1126F AMNT PAIN NOTED NONE PRSNT: CPT | Performed by: NURSE PRACTITIONER

## 2025-03-17 PROCEDURE — 84443 ASSAY THYROID STIM HORMONE: CPT

## 2025-03-17 PROCEDURE — 3078F DIAST BP <80 MM HG: CPT | Performed by: NURSE PRACTITIONER

## 2025-03-17 PROCEDURE — 83735 ASSAY OF MAGNESIUM: CPT

## 2025-03-17 PROCEDURE — 85025 COMPLETE CBC W/AUTO DIFF WBC: CPT

## 2025-03-17 ASSESSMENT — PAIN SCALES - GENERAL: PAINLEVEL_OUTOF10: 0-NO PAIN

## 2025-03-17 NOTE — PROGRESS NOTES
Patient here for follow up visit superglottis first day of treatment today.  Will be receiving cisplatin Starting tomorrow. Labs obtained this visit for review.   Very nervous thi morning.     She schedules transportation for her treatments and requests that all appointments be coordinated if possible. Ximena Copeland available for this visit to further discuss transportation.   Arrives in a wheelchair this visit.  Patient was 90 lbs last visit and 93 lbs today.  Followed by the Dietitian.  Patient here alone. Brother in law picking her up after appointment.     Patient walked with this nurse without oxygen, pulse ox dropped to 88% while walking on room air. 2 liters oxygen applied and pulse ox rebounded to 97% with 2 liters oxygen after 5 minutes.     Orders obtained for portable oxygen.  is assisting with getting portable oxygen.     Medications and Allergies reviewed and reconciled this visit.    Follow up per MD request.    Patient reports availability and use of mychart, Reviewed this is a good place to communicate with the team as well as review labs and upcoming orders.      No barriers to education noted, patient agrees to current plan and verbalized understanding using teach back method.

## 2025-03-17 NOTE — PROGRESS NOTES
DAIANA met with patient today. She has all her transportation scheduled thru her insurance company. She will use the services available thru both her [employer] and her [employer]. She is concerned as they are not picking her up until 7:30 Tuesday (tomorrow) but that is what time her appt is. DAIANA advised er to keep the  as if a call is made, the transport might be cancelled and it is better if she is late than if she is not here at all. DAIANA informed staff in day tx that pt would be running a little late due to transportation issues.   Pt is also requesting a portable oxygen concentrator as she is unable to carry a portable tank due to the weight and Pt's frailty.  DAIANA called [employer] and inquired as to what is needed and placed order for the tank. DAIANA confirmed this with patient and informed her they will be calling this week to deliver. Pt appreciative.

## 2025-03-17 NOTE — PROGRESS NOTES
Subjective:   Patient Name: Ingris Mckinney    : 1955     MRN: [id number]     Age: 69 y.o.     Gender: female  Referring Provider: ARNAV    CC: Management of newly diagnosed L sided supraglottic squamous cell carcinoma ([id number])    Presenting History (3/13/2025): At time of initial presentation a 69 y.o. female, current smoker (started at age 19,1/3 pack per day) with a past medical history of osteoporosis, HLD, COPD (occasionally on O2) referred for management of suprglottic cancer.      She has been following with ENT for history of vocal cord paralysis without CT abnormality since , which was successfully managed with injection medialization.     She then developed acute worsening of her voice in 2024, with scope exam shortly after that showed mucosal irregularity along the anterior left false cord. The left true vocal cord was fixed in the paramedian position. The right true vocal cord exhibited normal movement. She was taken to the operating room for direct and biopsy on 2025. Operative findings showed a left false cord mass lesion which was not obstructive, and extended from anterior to posterior. The mass extended to the laryngeal ventricle on the left, but spared the true vocal cord. The mass did not appear to cross to the right. The vallecula, base of tongue and hypopharynx were normal. Biopsies from the left supraglottic mass were positive for invasive moderately differentiated squamous cell carcinoma.     CT neck on 2025 showed an enhancing mass in the left supraglottic larynx, 1.0 x 1.7 x 1.4 cm in size, which approached the epiglottis without definitive invasion. There was an area of irregularity/thickening on the right true vocal cord of unclear etiology.     2025: PET/CT: 1.  Focal intense hypermetabolic activity at the left aryepiglottic fold compatible with biopsy-proven squamous cell carcinoma. 2. No evidence of hypermetabolic kevin or distant metastatic disease.3.  Mild hypermetabolic activity associated with the right upper lobe pulmonary nodule. Continued attention on follow-up chest CT is recommended.    She was seen by ENT Dr. Lo and unfortunately was deemed not surgically resectable. She presented to [facility] for a consultation accompanied by her brother in law. She livers on her own. She states that she has been lazy over the past months and has lost a lot of weight. She doesn't really eat much food other than cereals. Luckily her sister and in-law brought her dinner. She has gained 12 lb in the past weeks. She denies any dysphagia or odynophagia. NO pain. NO numbness or tingling. No heating issues. No fever or chills. No n/v/d/c    Shx: No etoh or illicit drugs.   2 sisters   No kids      Treatment Summary:  - N/A    Interval History (3/17/2025):    Patient presents today for routine follow-up evaluation. She is scheduled to begin concurrent chemo-radiation tomorrow March 18, 2025. We spent much of today's visit reviewing treatment plan and home antiemetic regimen. In regard to current symptom complaints her primary issue is JONES. She presented in wheelchair as felt SOB when walking from entry. She has home oxygen but no portable concentrator. She is unable to carry the portable oxygen tanks provided and safely walk. This is limiting her independence. She is afraid to drive herself.   She denies any fevers, chills or night sweats. +cough (COPD), no chest pain. JONES as described. No nausea or vomiting. No constipation or diarrhea. No urinary symptoms. No rash. No neuropathy.     Review of Systems:  A review of systems has been completed and are negative for complaints except what is stated in the HPI and/or past medical history      Medical History:   Past Medical History:   Diagnosis Date    Anxiety     At risk for falling     COPD (chronic obstructive pulmonary disease) (Multi)     Hypercholesteremia     Hypoxia     Larynx cancer (Multi)     Malnutrition (Multi)      Personal history of other diseases of the respiratory system     History of chronic obstructive lung disease    Respiratory failure (Multi)     Tachycardia     Weakness        Surgical History:   Past Surgical History:   Procedure Laterality Date    CT GUIDED IMAGING FOR NEEDLE PLACEMENT  05/05/2020    CT GUIDED IMAGING FOR NEEDLE PLACEMENT [facility]    LARYNGOSCOPY      direct w/ bx    OTHER SURGICAL HISTORY  11/11/2022    Hip surgery    OTHER SURGICAL HISTORY  11/11/2022    Arm surgery    OTHER SURGICAL HISTORY  11/11/2022    Leg surgery       Family History:  Family History   Problem Relation Name Age of Onset    Other (chronic lung disease) Other         Allergies:  Allergies   Allergen Reactions    Scallops Anaphylaxis    Iodinated Contrast Media Itching       Meds (Current):    Current Outpatient Medications:     albuterol 2.5 mg /3 mL (0.083 %) nebulizer solution, Take 3 mL (2.5 mg) by nebulization every 4 hours if needed for wheezing., Disp: , Rfl:     albuterol 90 mcg/actuation inhaler, Inhale 2 puffs every 4 hours if needed., Disp: , Rfl:     alendronate (Fosamax) 70 mg tablet, Take 1 tablet (70 mg) by mouth every 7 days. Take in the morning with a full glass of water, on an empty stomach, and do not take anything else by mouth or lie down for the next 30 min., Disp: 12 tablet, Rfl: 3    dexAMETHasone (Decadron) 4 mg tablet, Take 2 tablets (8 mg) by mouth once daily. For 3 days per week starting the day after treatment., Disp: 42 tablet, Rfl: 0    Dulera 200-5 mcg/actuation inhaler, Inhale 2 puffs twice a day., Disp: , Rfl:     OLANZapine (ZyPREXA) 5 mg tablet, Take 1 tablet (5 mg) by mouth once daily at bedtime., Disp: 30 tablet, Rfl: 1    ondansetron (Zofran) 8 mg tablet, Take 1 tablet (8 mg) by mouth every 8 hours if needed for nausea or vomiting., Disp: 30 tablet, Rfl: 5    prochlorperazine (Compazine) 10 mg tablet, Take 1 tablet (10 mg) by mouth every 6 hours if needed for nausea or  vomiting., Disp: 30 tablet, Rfl: 5    rosuvastatin (Crestor) 10 mg tablet, Take 1 tablet (10 mg) by mouth once daily., Disp: 90 tablet, Rfl: 2    umeclidinium (Incruse Ellipta) 62.5 mcg/actuation inhalation, Inhale 1 puff (62.5 mcg) once daily., Disp: , Rfl:       Pain Assessment:  Pain is: 0    Performance Status (ECOG): 2  ECOG Definition  0 Fully active; no performance restrictions.  1 Strenuous physical activity restricted; fully ambulatory and able to carry out light work.  2 Capable of all self-care but unable to carry out any work activities. Up and about >50% of waking hours.  3 Capable of only limited self-care; confined to bed or chair >50% of waking hours.  4 Completely disabled; cannot carry out any self-care; totally confined to bed or chair.  Exam:    Vital Signs:  /71 (BP Location: Right arm)   Pulse 110   Temp 36.1 °C (97 °F)   Resp 16   Wt (!) 42.2 kg (93 lb 0.6 oz)   SpO2 93%   BMI 17.12 kg/m²     Wt Readings from Last 5 Encounters:   25 (!) 42.2 kg (93 lb 0.6 oz)   25 (!) 40.9 kg (90 lb 2.7 oz)   03/10/25 (!) 41.7 kg (92 lb 0.7 oz)   25 (!) 35.4 kg (78 lb)   02/10/25 (!) 35.6 kg (78 lb 7.7 oz)       Physical Exam:  ECO  Pain: 0  Constitutional: Well developed, awake/alert/oriented x3, no distress, alert and cooperative  Eyes: PER. sclera anicteric  ENMT: Oral mucosa moist, no lesions or thrush identified  Respiratory/Thorax: Breathing is non-labored. Lungs are clear to auscultation bilaterally. No adventitious breath sounds  Cardiovascular: S1-S2. Regular rate and rhythm. No murmurs, rubs, or gallops appreciated  Gastrointestinal: Abdomen soft nontender, nondistended, normal active bowel sounds.  Musculoskeletal: ROM intact, no joint swelling, normal strength  Extremities: normal extremities, no cyanosis, no edema, no clubbing  Lymphatics: no palpable lymphadenopathy   Skin: no rash  Neurologic: alert and oriented x3. Nonfocal exam. No myoclonus  Psychological:  Pleasant, appropriate and easily engaged     Labs:  Lab Results   Component Value Date    WBC 5.6 2025    HGB 12.3 2025    HCT 38.2 2025    MCV 99 2025     2025      Lab Results   Component Value Date    NEUTROABS 3.86 2025      Lab Results   Component Value Date    GLUCOSE 121 (H) 2025    CALCIUM 9.2 2025     2025    K 3.9 2025    CO2 28 2025     2025    BUN 25 (H) 2025    CREATININE 0.42 (L) 2025    MG 1.83 2025     Lab Results   Component Value Date    ALT 24 2025    AST 26 2025    ALKPHOS 42 2025    BILITOT 0.3 2025      Lab Results   Component Value Date    TSH 2.33 2020                   Assessment/Plan:   69 y.o. female with past medical history as stated referred for management of supraglottic squamous cell carcinoma.    The patient's records and imaging have been reviewed in detail.  I have discussed with the patient the natural history of their disease at length.  The following has also been discussed with the patient:    # Cancer:   L sided supraglottic squamous cell carcinoma ([id number]). Given that she is not surgically resectable. I thus recommend treatment of weekly cisplatin with concurrent RT for 7 weeks. Side effects of chenotherapy including but not limited to nausea, vomiting, diarrhea, anemia, thrombocytopenia,  leukopenia, neutropenic fever, kidney toxicities, liver toxicities, rash, infusion related reaction, secondary malignancies MDS or AML, and fertility impact, neuropathy and hearing impact. Consent signed. To start next week.     - Interval Imagin2025: PET/CT: 1.  Focal intense hypermetabolic activity at the left aryepiglottic fold compatible with biopsy-proven squamous cell carcinoma. 2. No evidence of hypermetabolic kevin or distant metastatic disease.3. Mild hypermetabolic activity associated with the right upper lobe pulmonary nodule.  Continued attention on follow-up chest CT is recommended.    # Pulmonary nodule: will continue to follow.    # Clinical Trials:  None currently.     # Access: Peripheral veins.    # Pain: No acute pain.    # Bone Health: No signs of bony disease.     # Psychosocial: Coping well, no acute issues.  Good support from family & friends.  Social work support offered.    # Hearing: NO baseline hearing issues.    # Speech and swallow: We discussed the possibility of PEG Tube placement if nutritional goal is not met and significant weight loss.    # GI/CINV: severe malnutrition. Nutrition consult offered.    # Smoking: N/A, current smoker, working on quitting.    # Fertility: N/A.  Oncofertility support available as needed.      # Heme/ESAs: N/A.    #JONES: Has home oxygen ([employer]). Portable concentrator ordered. Oxygen 93% RA at rest. 88% walking short distance with assistance. 97% at rest on 2L via NC.     # GOC: Patient is aware of curative intent goals of care.    # Language: English.    # Interdisciplinary Patient/Family Education Record:  Learner:  Patient.  Learning Needs Reviewed:  Treatments, Medications, Disease Process, Diagnostic Tests.  Barriers: None.  Teaching Method: Discussion, Handout(s).  Comprehension:  Verbalized Understanding.  Follow-up Plan:  Return to office as per MD.    # Dispo: RTC in 1 week   Day 1 chemoRT scheduled for 3/18/2025      Michael Fregoso, APRN-CNP  [facility]  Thoracic + H&N Medical Oncology     I spent a total of 30+ minutes on the date of the service which included preparing to see the patient, face-to-face patient care, completing clinical documentation, obtaining and / or reviewing separately obtained history, counseling and educating the patient/family/caregiver, ordering medications, tests, or procedures, communicating with other healthcare providers (not separately reported), independently interpreting results, not separately reported, and communicating results to  the patient/family/caregiver, Name and date of birth verified.

## 2025-03-18 ENCOUNTER — APPOINTMENT (OUTPATIENT)
Dept: RADIATION ONCOLOGY | Facility: CLINIC | Age: 70
End: 2025-03-18
Payer: MEDICARE

## 2025-03-18 ENCOUNTER — INFUSION (OUTPATIENT)
Dept: HEMATOLOGY/ONCOLOGY | Facility: CLINIC | Age: 70
End: 2025-03-18
Payer: MEDICARE

## 2025-03-18 ENCOUNTER — HOSPITAL ENCOUNTER (OUTPATIENT)
Dept: RADIATION ONCOLOGY | Facility: CLINIC | Age: 70
Setting detail: RADIATION/ONCOLOGY SERIES
Discharge: HOME | End: 2025-03-18
Payer: MEDICARE

## 2025-03-18 VITALS
DIASTOLIC BLOOD PRESSURE: 87 MMHG | RESPIRATION RATE: 18 BRPM | OXYGEN SATURATION: 92 % | HEART RATE: 102 BPM | BODY MASS INDEX: 16.57 KG/M2 | WEIGHT: 90.06 LBS | TEMPERATURE: 97.3 F | SYSTOLIC BLOOD PRESSURE: 112 MMHG

## 2025-03-18 DIAGNOSIS — C32.8 MALIGNANT NEOPLASM OF OVERLAPPING SITES OF LARYNX (MULTI): ICD-10-CM

## 2025-03-18 DIAGNOSIS — C32.1 PRIMARY SQUAMOUS CELL CARCINOMA OF SUPRAGLOTTIS (MULTI): ICD-10-CM

## 2025-03-18 LAB
RAD ONC MSQ ACTUAL FRACTIONS DELIVERED: 1
RAD ONC MSQ ACTUAL SESSION DELIVERED DOSE: 200 CGRAY
RAD ONC MSQ ACTUAL TOTAL DOSE: 200 CGRAY
RAD ONC MSQ ELAPSED DAYS: 0
RAD ONC MSQ LAST DATE: NORMAL
RAD ONC MSQ PRESCRIBED FRACTIONAL DOSE: 200 CGRAY
RAD ONC MSQ PRESCRIBED NUMBER OF FRACTIONS: 35
RAD ONC MSQ PRESCRIBED TECHNIQUE: NORMAL
RAD ONC MSQ PRESCRIBED TOTAL DOSE: 7000 CGRAY
RAD ONC MSQ PRESCRIPTION PATTERN COMMENT: NORMAL
RAD ONC MSQ START DATE: NORMAL
RAD ONC MSQ TREATMENT COURSE NUMBER: 1
RAD ONC MSQ TREATMENT SITE: NORMAL

## 2025-03-18 PROCEDURE — 2500000004 HC RX 250 GENERAL PHARMACY W/ HCPCS (ALT 636 FOR OP/ED): Performed by: STUDENT IN AN ORGANIZED HEALTH CARE EDUCATION/TRAINING PROGRAM

## 2025-03-18 PROCEDURE — 77336 RADIATION PHYSICS CONSULT: CPT | Performed by: STUDENT IN AN ORGANIZED HEALTH CARE EDUCATION/TRAINING PROGRAM

## 2025-03-18 PROCEDURE — 96361 HYDRATE IV INFUSION ADD-ON: CPT | Mod: INF

## 2025-03-18 PROCEDURE — 96367 TX/PROPH/DG ADDL SEQ IV INF: CPT

## 2025-03-18 PROCEDURE — 77386 HC INTENSITY-MODULATED RADIATION THERAPY (IMRT), COMPLEX: CPT | Performed by: STUDENT IN AN ORGANIZED HEALTH CARE EDUCATION/TRAINING PROGRAM

## 2025-03-18 PROCEDURE — 96375 TX/PRO/DX INJ NEW DRUG ADDON: CPT | Mod: INF

## 2025-03-18 PROCEDURE — 96413 CHEMO IV INFUSION 1 HR: CPT

## 2025-03-18 RX ORDER — PROCHLORPERAZINE MALEATE 10 MG
10 TABLET ORAL EVERY 6 HOURS PRN
Status: DISCONTINUED | OUTPATIENT
Start: 2025-03-18 | End: 2025-03-18 | Stop reason: HOSPADM

## 2025-03-18 RX ORDER — PROCHLORPERAZINE EDISYLATE 5 MG/ML
10 INJECTION INTRAMUSCULAR; INTRAVENOUS EVERY 6 HOURS PRN
Status: DISCONTINUED | OUTPATIENT
Start: 2025-03-18 | End: 2025-03-18 | Stop reason: HOSPADM

## 2025-03-18 RX ORDER — FAMOTIDINE 10 MG/ML
20 INJECTION, SOLUTION INTRAVENOUS ONCE AS NEEDED
Status: DISCONTINUED | OUTPATIENT
Start: 2025-03-18 | End: 2025-03-18 | Stop reason: HOSPADM

## 2025-03-18 RX ORDER — ALBUTEROL SULFATE 0.83 MG/ML
3 SOLUTION RESPIRATORY (INHALATION) AS NEEDED
Status: DISCONTINUED | OUTPATIENT
Start: 2025-03-18 | End: 2025-03-18 | Stop reason: HOSPADM

## 2025-03-18 RX ORDER — PALONOSETRON 0.05 MG/ML
0.25 INJECTION, SOLUTION INTRAVENOUS ONCE
Status: COMPLETED | OUTPATIENT
Start: 2025-03-18 | End: 2025-03-18

## 2025-03-18 RX ORDER — EPINEPHRINE 0.3 MG/.3ML
0.3 INJECTION SUBCUTANEOUS EVERY 5 MIN PRN
Status: DISCONTINUED | OUTPATIENT
Start: 2025-03-18 | End: 2025-03-18 | Stop reason: HOSPADM

## 2025-03-18 RX ORDER — DEXAMETHASONE 6 MG/1
12 TABLET ORAL ONCE
Status: COMPLETED | OUTPATIENT
Start: 2025-03-18 | End: 2025-03-18

## 2025-03-18 RX ORDER — DIPHENHYDRAMINE HYDROCHLORIDE 50 MG/ML
50 INJECTION, SOLUTION INTRAMUSCULAR; INTRAVENOUS AS NEEDED
Status: DISCONTINUED | OUTPATIENT
Start: 2025-03-18 | End: 2025-03-18 | Stop reason: HOSPADM

## 2025-03-18 RX ADMIN — CISPLATIN 54 MG: 1 INJECTION, SOLUTION INTRAVENOUS at 10:01

## 2025-03-18 RX ADMIN — MAGNESIUM SULFATE HEPTAHYDRATE 999 ML/HR: 500 INJECTION, SOLUTION INTRAMUSCULAR; INTRAVENOUS at 08:16

## 2025-03-18 RX ADMIN — DEXAMETHASONE 12 MG: 6 TABLET ORAL at 08:05

## 2025-03-18 RX ADMIN — PALONOSETRON HYDROCHLORIDE 0.25 MG: 0.25 INJECTION INTRAVENOUS at 08:05

## 2025-03-18 RX ADMIN — SODIUM CHLORIDE 1000 ML: 9 INJECTION, SOLUTION INTRAVENOUS at 10:50

## 2025-03-18 RX ADMIN — FOSAPREPITANT 150 MG: 150 INJECTION, POWDER, LYOPHILIZED, FOR SOLUTION INTRAVENOUS at 09:21

## 2025-03-18 ASSESSMENT — PAIN SCALES - GENERAL: PAINLEVEL_OUTOF10: 0-NO PAIN

## 2025-03-19 ENCOUNTER — APPOINTMENT (OUTPATIENT)
Dept: RADIATION ONCOLOGY | Facility: CLINIC | Age: 70
End: 2025-03-19
Payer: MEDICARE

## 2025-03-19 ENCOUNTER — HOSPITAL ENCOUNTER (OUTPATIENT)
Dept: RADIATION ONCOLOGY | Facility: CLINIC | Age: 70
Setting detail: RADIATION/ONCOLOGY SERIES
Discharge: HOME | End: 2025-03-19
Payer: MEDICARE

## 2025-03-19 DIAGNOSIS — Z51.0 ENCOUNTER FOR ANTINEOPLASTIC RADIATION THERAPY: ICD-10-CM

## 2025-03-19 DIAGNOSIS — C32.8 MALIGNANT NEOPLASM OF OVERLAPPING SITES OF LARYNX (MULTI): ICD-10-CM

## 2025-03-19 LAB
RAD ONC MSQ ACTUAL FRACTIONS DELIVERED: 2
RAD ONC MSQ ACTUAL SESSION DELIVERED DOSE: 200 CGRAY
RAD ONC MSQ ACTUAL TOTAL DOSE: 400 CGRAY
RAD ONC MSQ ELAPSED DAYS: 1
RAD ONC MSQ LAST DATE: NORMAL
RAD ONC MSQ PRESCRIBED FRACTIONAL DOSE: 200 CGRAY
RAD ONC MSQ PRESCRIBED NUMBER OF FRACTIONS: 35
RAD ONC MSQ PRESCRIBED TECHNIQUE: NORMAL
RAD ONC MSQ PRESCRIBED TOTAL DOSE: 7000 CGRAY
RAD ONC MSQ PRESCRIPTION PATTERN COMMENT: NORMAL
RAD ONC MSQ START DATE: NORMAL
RAD ONC MSQ TREATMENT COURSE NUMBER: 1
RAD ONC MSQ TREATMENT SITE: NORMAL

## 2025-03-19 PROCEDURE — 77386 HC INTENSITY-MODULATED RADIATION THERAPY (IMRT), COMPLEX: CPT | Performed by: STUDENT IN AN ORGANIZED HEALTH CARE EDUCATION/TRAINING PROGRAM

## 2025-03-20 ENCOUNTER — APPOINTMENT (OUTPATIENT)
Dept: RADIATION ONCOLOGY | Facility: CLINIC | Age: 70
End: 2025-03-20
Payer: MEDICARE

## 2025-03-20 ENCOUNTER — HOSPITAL ENCOUNTER (OUTPATIENT)
Dept: RADIATION ONCOLOGY | Facility: CLINIC | Age: 70
Setting detail: RADIATION/ONCOLOGY SERIES
Discharge: HOME | End: 2025-03-20
Payer: MEDICARE

## 2025-03-20 ENCOUNTER — NUTRITION (OUTPATIENT)
Dept: HEMATOLOGY/ONCOLOGY | Facility: CLINIC | Age: 70
End: 2025-03-20

## 2025-03-20 ENCOUNTER — INFUSION (OUTPATIENT)
Dept: HEMATOLOGY/ONCOLOGY | Facility: CLINIC | Age: 70
End: 2025-03-20
Payer: MEDICARE

## 2025-03-20 VITALS
OXYGEN SATURATION: 99 % | SYSTOLIC BLOOD PRESSURE: 133 MMHG | WEIGHT: 95.9 LBS | TEMPERATURE: 96.1 F | BODY MASS INDEX: 17.65 KG/M2 | RESPIRATION RATE: 18 BRPM | DIASTOLIC BLOOD PRESSURE: 79 MMHG | HEART RATE: 90 BPM

## 2025-03-20 VITALS
DIASTOLIC BLOOD PRESSURE: 79 MMHG | BODY MASS INDEX: 17.63 KG/M2 | TEMPERATURE: 96.1 F | OXYGEN SATURATION: 99 % | SYSTOLIC BLOOD PRESSURE: 133 MMHG | HEART RATE: 90 BPM | WEIGHT: 95.79 LBS | RESPIRATION RATE: 18 BRPM

## 2025-03-20 DIAGNOSIS — Z51.0 ENCOUNTER FOR ANTINEOPLASTIC RADIATION THERAPY: ICD-10-CM

## 2025-03-20 DIAGNOSIS — C32.1 PRIMARY SQUAMOUS CELL CARCINOMA OF SUPRAGLOTTIS (MULTI): ICD-10-CM

## 2025-03-20 DIAGNOSIS — C32.8 MALIGNANT NEOPLASM OF OVERLAPPING SITES OF LARYNX (MULTI): ICD-10-CM

## 2025-03-20 DIAGNOSIS — E46 MALNUTRITION, UNSPECIFIED TYPE (MULTI): Primary | ICD-10-CM

## 2025-03-20 DIAGNOSIS — C32.1 PRIMARY SQUAMOUS CELL CARCINOMA OF SUPRAGLOTTIS: ICD-10-CM

## 2025-03-20 DIAGNOSIS — C32.1 PRIMARY SQUAMOUS CELL CARCINOMA OF SUPRAGLOTTIS: Primary | ICD-10-CM

## 2025-03-20 LAB
RAD ONC MSQ ACTUAL FRACTIONS DELIVERED: 3
RAD ONC MSQ ACTUAL SESSION DELIVERED DOSE: 200 CGRAY
RAD ONC MSQ ACTUAL TOTAL DOSE: 600 CGRAY
RAD ONC MSQ ELAPSED DAYS: 2
RAD ONC MSQ LAST DATE: NORMAL
RAD ONC MSQ PRESCRIBED FRACTIONAL DOSE: 200 CGRAY
RAD ONC MSQ PRESCRIBED NUMBER OF FRACTIONS: 35
RAD ONC MSQ PRESCRIBED TECHNIQUE: NORMAL
RAD ONC MSQ PRESCRIBED TOTAL DOSE: 7000 CGRAY
RAD ONC MSQ PRESCRIPTION PATTERN COMMENT: NORMAL
RAD ONC MSQ START DATE: NORMAL
RAD ONC MSQ TREATMENT COURSE NUMBER: 1
RAD ONC MSQ TREATMENT SITE: NORMAL

## 2025-03-20 PROCEDURE — 2500000004 HC RX 250 GENERAL PHARMACY W/ HCPCS (ALT 636 FOR OP/ED): Performed by: STUDENT IN AN ORGANIZED HEALTH CARE EDUCATION/TRAINING PROGRAM

## 2025-03-20 PROCEDURE — 96360 HYDRATION IV INFUSION INIT: CPT | Mod: INF

## 2025-03-20 PROCEDURE — 77386 HC INTENSITY-MODULATED RADIATION THERAPY (IMRT), COMPLEX: CPT | Performed by: STUDENT IN AN ORGANIZED HEALTH CARE EDUCATION/TRAINING PROGRAM

## 2025-03-20 RX ADMIN — SODIUM CHLORIDE 1000 ML: 9 INJECTION, SOLUTION INTRAVENOUS at 08:34

## 2025-03-20 ASSESSMENT — PATIENT HEALTH QUESTIONNAIRE - PHQ9
2. FEELING DOWN, DEPRESSED OR HOPELESS: NOT AT ALL
1. LITTLE INTEREST OR PLEASURE IN DOING THINGS: NOT AT ALL
SUM OF ALL RESPONSES TO PHQ9 QUESTIONS 1 AND 2: 0

## 2025-03-20 ASSESSMENT — ENCOUNTER SYMPTOMS
LOSS OF SENSATION IN FEET: 0
OCCASIONAL FEELINGS OF UNSTEADINESS: 1
DEPRESSION: 0

## 2025-03-20 ASSESSMENT — PAIN SCALES - GENERAL
PAINLEVEL_OUTOF10: 0-NO PAIN
PAINLEVEL_OUTOF10: 0-NO PAIN

## 2025-03-20 ASSESSMENT — COLUMBIA-SUICIDE SEVERITY RATING SCALE - C-SSRS
2. HAVE YOU ACTUALLY HAD ANY THOUGHTS OF KILLING YOURSELF?: NO
6. HAVE YOU EVER DONE ANYTHING, STARTED TO DO ANYTHING, OR PREPARED TO DO ANYTHING TO END YOUR LIFE?: NO
1. IN THE PAST MONTH, HAVE YOU WISHED YOU WERE DEAD OR WISHED YOU COULD GO TO SLEEP AND NOT WAKE UP?: NO

## 2025-03-20 NOTE — PROGRESS NOTES
Radiation Oncology On Treatment Visit    Patient Name:  Ingris Mckinney  MRN:  [id number]  :  1955    Referring Provider: No ref. provider found  Primary Care Provider: Marge Camacho MD  Care Team: Patient Care Team:  Marge Camacho MD as PCP - General (Internal Medicine)  Ngoc Selby MD as PCP - [employer] PCP  Noemy Rivas DO as Radiation Oncologist (Radiation Oncology)  Tamiko Mahoney RN as Registered Nurse (Radiation Therapy)  Daphney Loo RN as Nurse Navigator (Hematology and Oncology)  Kimmie Waters MD MPH as Consulting Physician (Hematology and Oncology)    Date of Service: 3/20/2025     Diagnosis:   Specialty Problems          Radiation Oncology Problems    Primary squamous cell carcinoma of supraglottis (Multi)         Treatment Summary:  Radiation Therapy    Treatment Period Technique Fraction Dose Fractions Total Dose   Course 1 3/18/2025-3/20/2025  (days elapsed: 2)         H&N 3/18/2025-3/20/2025 VMAT 200 / 200 cGy 3 / 35 600 / 7,000 cGy     SUBJECTIVE: Just started treatment and tolerating well. Regained some weight and denies any dysphagia or odynophagia. Taking solid food PO as well as 2 Boosts. Discussed feeding tube again given tumor location and her low baseline weight, and she is agreeable - will get her scheduled. Encouraged continued PO intake for as long as tolerated.      OBJECTIVE:   Vital Signs:  /79   Pulse 90   Temp 35.6 °C (96.1 °F) (Temporal)   Resp 18   Wt (!) 43.5 kg (95 lb 14.4 oz)   SpO2 99%   BMI 17.65 kg/m²     Daily Weight  25 : (!) 43.5 kg (95 lb 14.4 oz)  25 : (!) 43.5 kg (95 lb 12.6 oz)  25 : (!) 40.9 kg (90 lb 0.9 oz)  25 : (!) 42.2 kg (93 lb 0.6 oz)  25 : (!) 40.9 kg (90 lb 2.7 oz)  03/10/25 : (!) 41.7 kg (92 lb 0.7 oz)  25 : (!) 35.4 kg (78 lb)  02/10/25 : (!) 35.6 kg (78 lb 7.7 oz)  02/10/25 : (!) 35.6 kg (78 lb 7.7 oz)  25 : (!) 38.2 kg (84 lb 3.2 oz)     Other Pertinent Findings:      Toxicity Assessment          3/20/2025    10:44 3/20/2025    10:46   Toxicity Assessment   Treatment  and neck    Anorexia Grade 0       eating regular food plus 2 anna calorie Boosts/day Grade 0       drinking 2 high calorie Boosts/day and eating regular foods   Anxiety  Grade 1   Dehydration  Grade 0   Depression  Grade 0   Dermatitis Radiation  Grade 0   Diarrhea  Grade 1       with anxiety   Fatigue  Grade 1   Nausea  Grade 0   Pain  Grade 0   Vomiting  Grade 0   Dysphagia  Grade 0   Esophagitis  Grade 0   Mucositis Oral  Grade 0       using salt and soda rinse and Blis lozenges   Hearing Impaired  Grade 0   Blurred Vision  Grade 0   Dry Eye  Grade 0   Eye Pain  Grade 0   Dry Mouth  Grade 0   Ear Pain  Grade 0   External Ear Pain  Grade 0   Tinnitus  Grade 0   Oral Pain  Grade 0   Edema Face  Grade 0   Trismus  Grade 0   Dysarthria  Grade 0   Dysesthesia  Grade 0   Dysgeusia  Grade 0   Aspiration  Grade 0   Hoarseness  Grade 2   Voice Alteration  Grade 2        Assessment / Plan:  The patient is tolerating radiation therapy as anticipated.  Continue per current treatment plan.

## 2025-03-20 NOTE — PROGRESS NOTES
NUTRITION Follow Up NOTE    Nutrition Assessment     Reason for Visit:  Ingris Mckinney is a 69 y.o. female who presents for primary SCC supraglottis  Pt in for radiation consult, asked to see 2/2 dx, weight status, weight loss    TX: Cisplatin with concurrent radiation  Patient lives in [place], Zulema lives in [place], other sister in [facility]. Sisters help pt with buying groceries etc    Contact Zulema Mckinney [phone]    3/20- Pt seen with OTV. She is alone, in a wheelchair. Her insurance company provides transportation  She has not made apt for PEG. After clarification with Michael WOMACK referral put in for general surgery who will contact pt  Her oral intake has improved whereas she is eating more than one meal per day now and taking ONS consistently      Patient Active Problem List   Diagnosis    Acute on chronic respiratory failure with hypoxia and hypercapnia (Multi)    Anxiety    Balance disorder    Blood loss anemia    Closed fracture of pelvis (Multi)    Closed fracture of right inferior pubic ramus    Degenerative joint disease (DJD) of hip    Depression    Essential hypertension    ETOH abuse    Hyperlipidemia    Hypoxemia    Centrilobular emphysema (Multi)    COPD with exacerbation (Multi)    Severe protein-calorie malnutrition (Multi)    Lesion of larynx    Primary squamous cell carcinoma of supraglottis (Multi)       Nutrition Significant Labs:  Lab Results   Component Value Date/Time    GLUCOSE 121 (H) 03/17/2025 0814     03/17/2025 0814    K 3.9 03/17/2025 0814     03/17/2025 0814    CO2 28 03/17/2025 0814    ANIONGAP 13 03/17/2025 0814    BUN 25 (H) 03/17/2025 0814    CREATININE 0.42 (L) 03/17/2025 0814    EGFR >90 03/17/2025 0814    CALCIUM 9.2 03/17/2025 0814    ALBUMIN 4.1 03/17/2025 0814    ALKPHOS 42 03/17/2025 0814    PROT 6.8 03/17/2025 0814    AST 26 03/17/2025 0814    BILITOT 0.3 03/17/2025 0814    ALT 24 03/17/2025 0814    MG 1.83 03/17/2025 0814     No results found for:  "\"VITD25\"      Anthropometrics:               IBW/kg (Dietitian Calculated): 47.7 kg         Daily Weight  03/20/25 : (!) 43.5 kg (95 lb 14.4 oz)  03/20/25 : (!) 43.5 kg (95 lb 12.6 oz)  03/18/25 : (!) 40.9 kg (90 lb 0.9 oz)  03/17/25 : (!) 42.2 kg (93 lb 0.6 oz)  03/13/25 : (!) 40.9 kg (90 lb 2.7 oz)  03/10/25 : (!) 41.7 kg (92 lb 0.7 oz)  03/05/25 : (!) 35.4 kg (78 lb)  02/10/25 : (!) 35.6 kg (78 lb 7.7 oz)  02/10/25 : (!) 35.6 kg (78 lb 7.7 oz)  01/24/25 : (!) 38.2 kg (84 lb 3.2 oz)  01/13/25 : (!) 39 kg (86 lb)  12/30/24 : (!) 39.2 kg (86 lb 6.4 oz)  12/09/24 : (!) 3.629 kg (8 lb)  10/18/24 : (!) 42.2 kg (93 lb)  09/23/24 : (!) 42.2 kg (93 lb)  07/16/24 : (!) 41.3 kg (91 lb)  05/05/23 : 50.5 kg (111 lb 6.4 oz)  02/24/23 : 49 kg (108 lb)  01/20/23 : 48.4 kg (106 lb 12.8 oz)  12/14/22 : 49.4 kg (109 lb)     3/20- [facility] wt 43.4kg / 95#    Weight Change %:  Weight History / % Weight Change: weight continues to trend up    Nutrition History:  Food & Nutrition History: 1060 calories  Food Allergies:    Food Intolerances:    Vitamin/mineral intake:       Herbal supplements:    Medication and Complementary/Alternative Medicine Use:    Dentition:    Sleep Habits:      Diet Recall:  Meal 1: wants to get some cereal to eat for breakfast - currently not eating breakfast  Snack 1:    Meal 2: stouffers frozen meal  Snack 2:    Meal 3: [employer] sandwich, fried, chocolate milkshake - family door dashes to pt  Snack 3:    Food Variety:    Oral Nutrition Supplement Use: Oral Nutrition Supplements:  (from pts description she thinks she is taking Boost VHC that brother has been buying. Pt should be getting from [facility]. Drinking 2/day 1060 calories, 44 grams protein) Frequency: BID Calories: 530 calories each container Protein: 22 grams each container  Fluid Intake:    Energy Intake: Good > 75 %    Food Preparation:  Cooking: Patient, Family  Grocery Shopping: Family  Dining Out:      Medications:  Current Outpatient Medications " "  Medication Instructions    albuterol 90 mcg/actuation inhaler 2 puffs, Every 4 hours PRN    albuterol 2.5 mg, Every 4 hours PRN    alendronate (FOSAMAX) 70 mg, oral, Every 7 days, Take in the morning with a full glass of water, on an empty stomach, and do not take anything else by mouth or lie down for the next 30 min.    dexAMETHasone (DECADRON) 8 mg, oral, Daily, For 3 days per week starting the day after treatment.    Dulera 200-5 mcg/actuation inhaler 2 puffs, 2 times daily    OLANZapine (ZYPREXA) 5 mg, oral, Nightly    ondansetron (ZOFRAN) 8 mg, oral, Every 8 hours PRN    prochlorperazine (COMPAZINE) 10 mg, oral, Every 6 hours PRN    rosuvastatin (CRESTOR) 10 mg, oral, Daily    umeclidinium (Incruse Ellipta) 62.5 mcg/actuation inhalation 1 puff, Daily RT       Nutrition Focused Physical Exam Findings:    Subcutaneous Fat Loss:   Orbital Fat Pads: Severe (dark circles, hollowing and loose skin)  Buccal Fat Pads: Severe (hollow, sunken and narrow face)  Triceps: Defer  Ribs: Defer    Muscle Wasting:  Temporalis: Severe (hollowed scooping depression)  Pectoralis (Clavicular Region): Severe (protruding prominent clavicle)  Deltoid/Trapezius: Severe (squared shoulders, acromion process prominent)  Interosseous: Defer  Trapezius/Infraspinatus/Supraspinatus (Scapular Region): Defer  Quadriceps: Defer  Gastrocnemius: Defer    Physical Findings:  Digestive System Findings:  (diarrhea, often but not daily from \"nerves\" Does not take medication, resolves on its own)  Mouth Findings:  (food is tasting normal, denies swallowing pain, dry mouth, thick saliva. Using s/s rinses and probiotic lozenges)       Estimated Needs:            Total Energy Estimated Needs in 24 hours (kCal): 1250 kCal  Energy Estimated Needs per kg Body Weight in 24 hours (kCal/kg): 35 kCal/kg (actual BW used for assessment weight)  Total Protein Estimated Needs in 24 Hours (g): 53 g  Protein Estimated Needs per kg Body Weight in 24 Hours (g/kg): 1.5 " g/kg  Total Fluid Estimated Needs in 24 Hours (mL): 1250 mL  Method for Estimating 24 Hour Fluid Needs: 1 calorie/kg                       Nutrition Diagnosis   Malnutrition Diagnosis  Patient has Malnutrition Diagnosis: Yes  Diagnosis Status: Active  Malnutrition Diagnosis: Severe malnutrition related to chronic disease or condition  Related to: 9% weight loss over 1 month, poor oral intake, findings on NFPE (severe depletion fat and muscle stores)    Nutrition Diagnosis  Patient has Nutrition Diagnosis: Yes  Diagnosis Status (1): Active  Nutrition Diagnosis 1: Predicted inadequate energy intake  Related to (1): pathophsyiology of disease and treatment  As Evidenced by (1): anticipated nutrition impact symptoms affecting oral intake and weight  Additional Nutrition Diagnosis: Diagnosis 3  Diagnosis Status (2): Active  Nutrition Diagnosis 2: Increased energy expenditure  Related to (2): increased metabolic demand, disease process  As Evidenced by (2): cancer diagnosis, planned treatment  Diagnosis Status (3): Active  Nutrition Diagnosis 3: Disordered eating pattern  Related to (3): patients meal habits  As Evidenced by (3): pt not eating, caring for self, will not prepare food       Nutrition Interventions/Recommendations   Nutrition Prescription: Oral nutrition soft/puree/liquid diet, high calorie high protein, management of NIS    Nutrition Interventions:   Food and Nutrient Delivery: Meals & Snacks: Energy-modified diet, Fluid-modified diet, Modification of schedule of oral intake, Protein-modified diet, Texture-Modified Diet  Goals: Praised improvements in oral intake. Encouraged to continue to consume 2 meals per day, try to add breakfast meal - emphasizing every bite counts.  Avoid dry harsh  foods and acidic foods/beverages. Include adequate fluid intake. Sisters encouraging oral intake, buying groceries. Continue to reinforce importance of nutrition, oral diet, energy needs encouraging oral intake  Enteral  Nutrition:  (Plan is for pt to get PEG.  when indicated recommend Isosource 1.5 (3.5) cartons per day to provide 1312 calories, 60 grams protein, 669 ml free water)  Medical Food Supplement: Commercial beverage medical food supplement therapy  Goals: Continue Boost VHC BID. Call made to [facility] to confirm if shipment of Boost [employer] was sent to patient  Vitamin Supplement Therapy:  (will recommend chewable MV at next visit)  Feeding Assistance Management: Mouth care management  Goals: as directed     Coordination of Care: Collaboration and referral of nutrition care:  (collaboration with other providers)  Goals: work with team to achieve best possible outcomes     DME: [facility]  Boost VHC BID  Provides 1060 calories and 44 grams protein  Comments: when pt has appointment scheduled for PEG will send new orders for enteral nutrition to [facility]  Pt already receives O2 from [facility]    Nutrition Education:   Nutrition Education Content:                    Nutrition Monitoring and Evaluation   Food and Nutrient Intake  Monitoring and Evaluation Plan: Energy intake, Meal/snack pattern, Protein intake, Fluid intake, Amount of food  Energy Intake: Estimated energy intake  Criteria: Meet >75% estimated energy needs- food recall  Fluid Intake: Estimated fluid intake  Criteria: maintain hydration  Amount of Food: Medical food intake  Criteria: ONS as recommeded  Meal/Snack Pattern: Estimated meal and snack pattern  Criteria: 2-4 small meals/snacks per day  Estimated protein intake: Estimated protein intake  Criteria: meet >75% estimated protein needs - food recall    Anthropometric measurements  Monitoring and Evaluation Plan: Weight  Body Weight: Measured body weight  Criteria: maintain/gain weight         Nutrition Focused Physical Findings  Monitoring and Evaluation Plan: Adipose, Muscle  Adipose Finding: Loss of subcutaneous fat  Criteria: prevent further losses  Muscle Finding: Muscle atrophy  Criteria: prevent further  losses         Follow Up: through treatment      Time Spent  Prep time on day of patient encounter: 5 minutes  Time spent directly with patient, family or caregiver: 15 minutes  Additional Time Spent on Patient Care Activities: 10 minutes  Documentation Time: 30 minutes  Other Time Spent: 0 minutes  Total: 60 minutes

## 2025-03-21 ENCOUNTER — HOSPITAL ENCOUNTER (OUTPATIENT)
Dept: RADIATION ONCOLOGY | Facility: CLINIC | Age: 70
Setting detail: RADIATION/ONCOLOGY SERIES
Discharge: HOME | End: 2025-03-21
Payer: MEDICARE

## 2025-03-21 ENCOUNTER — APPOINTMENT (OUTPATIENT)
Dept: RADIATION ONCOLOGY | Facility: CLINIC | Age: 70
End: 2025-03-21
Payer: MEDICARE

## 2025-03-21 DIAGNOSIS — Z51.0 ENCOUNTER FOR ANTINEOPLASTIC RADIATION THERAPY: ICD-10-CM

## 2025-03-21 DIAGNOSIS — C32.8 MALIGNANT NEOPLASM OF OVERLAPPING SITES OF LARYNX (MULTI): ICD-10-CM

## 2025-03-21 DIAGNOSIS — E43 SEVERE PROTEIN-CALORIE MALNUTRITION (MULTI): Primary | ICD-10-CM

## 2025-03-21 DIAGNOSIS — C32.1 PRIMARY SQUAMOUS CELL CARCINOMA OF SUPRAGLOTTIS: ICD-10-CM

## 2025-03-21 LAB
RAD ONC MSQ ACTUAL FRACTIONS DELIVERED: 4
RAD ONC MSQ ACTUAL SESSION DELIVERED DOSE: 200 CGRAY
RAD ONC MSQ ACTUAL TOTAL DOSE: 800 CGRAY
RAD ONC MSQ ELAPSED DAYS: 3
RAD ONC MSQ LAST DATE: NORMAL
RAD ONC MSQ PRESCRIBED FRACTIONAL DOSE: 200 CGRAY
RAD ONC MSQ PRESCRIBED NUMBER OF FRACTIONS: 35
RAD ONC MSQ PRESCRIBED TECHNIQUE: NORMAL
RAD ONC MSQ PRESCRIBED TOTAL DOSE: 7000 CGRAY
RAD ONC MSQ PRESCRIPTION PATTERN COMMENT: NORMAL
RAD ONC MSQ START DATE: NORMAL
RAD ONC MSQ TREATMENT COURSE NUMBER: 1
RAD ONC MSQ TREATMENT SITE: NORMAL

## 2025-03-21 PROCEDURE — 77386 HC INTENSITY-MODULATED RADIATION THERAPY (IMRT), COMPLEX: CPT | Performed by: STUDENT IN AN ORGANIZED HEALTH CARE EDUCATION/TRAINING PROGRAM

## 2025-03-24 ENCOUNTER — HOSPITAL ENCOUNTER (OUTPATIENT)
Dept: RADIATION ONCOLOGY | Facility: CLINIC | Age: 70
Setting detail: RADIATION/ONCOLOGY SERIES
Discharge: HOME | End: 2025-03-24
Payer: MEDICARE

## 2025-03-24 ENCOUNTER — INFUSION (OUTPATIENT)
Dept: HEMATOLOGY/ONCOLOGY | Facility: CLINIC | Age: 70
End: 2025-03-24
Payer: MEDICARE

## 2025-03-24 ENCOUNTER — APPOINTMENT (OUTPATIENT)
Dept: RADIATION ONCOLOGY | Facility: CLINIC | Age: 70
End: 2025-03-24
Payer: MEDICARE

## 2025-03-24 ENCOUNTER — OFFICE VISIT (OUTPATIENT)
Dept: HEMATOLOGY/ONCOLOGY | Facility: CLINIC | Age: 70
End: 2025-03-24
Payer: MEDICARE

## 2025-03-24 VITALS
WEIGHT: 94.47 LBS | DIASTOLIC BLOOD PRESSURE: 71 MMHG | RESPIRATION RATE: 18 BRPM | TEMPERATURE: 97.3 F | OXYGEN SATURATION: 93 % | HEART RATE: 98 BPM | BODY MASS INDEX: 17.38 KG/M2 | SYSTOLIC BLOOD PRESSURE: 108 MMHG

## 2025-03-24 VITALS
WEIGHT: 94.47 LBS | BODY MASS INDEX: 17.38 KG/M2 | TEMPERATURE: 97.3 F | RESPIRATION RATE: 18 BRPM | OXYGEN SATURATION: 93 % | HEART RATE: 98 BPM

## 2025-03-24 DIAGNOSIS — Z51.0 ENCOUNTER FOR ANTINEOPLASTIC RADIATION THERAPY: ICD-10-CM

## 2025-03-24 DIAGNOSIS — C32.1 PRIMARY SQUAMOUS CELL CARCINOMA OF SUPRAGLOTTIS: ICD-10-CM

## 2025-03-24 DIAGNOSIS — C32.8 MALIGNANT NEOPLASM OF OVERLAPPING SITES OF LARYNX (MULTI): ICD-10-CM

## 2025-03-24 LAB
ALBUMIN SERPL BCP-MCNC: 4.3 G/DL (ref 3.4–5)
ALP SERPL-CCNC: 40 U/L (ref 33–136)
ALT SERPL W P-5'-P-CCNC: 24 U/L (ref 7–45)
ANION GAP SERPL CALC-SCNC: 11 MMOL/L (ref 10–20)
AST SERPL W P-5'-P-CCNC: 23 U/L (ref 9–39)
BASOPHILS # BLD AUTO: 0.03 X10*3/UL (ref 0–0.1)
BASOPHILS NFR BLD AUTO: 0.5 %
BILIRUB SERPL-MCNC: 0.3 MG/DL (ref 0–1.2)
BUN SERPL-MCNC: 28 MG/DL (ref 6–23)
CALCIUM SERPL-MCNC: 9.8 MG/DL (ref 8.6–10.3)
CHLORIDE SERPL-SCNC: 97 MMOL/L (ref 98–107)
CO2 SERPL-SCNC: 36 MMOL/L (ref 21–32)
CREAT SERPL-MCNC: 0.51 MG/DL (ref 0.5–1.05)
EGFRCR SERPLBLD CKD-EPI 2021: >90 ML/MIN/1.73M*2
EOSINOPHIL # BLD AUTO: 0.26 X10*3/UL (ref 0–0.7)
EOSINOPHIL NFR BLD AUTO: 4.1 %
ERYTHROCYTE [DISTWIDTH] IN BLOOD BY AUTOMATED COUNT: 13.2 % (ref 11.5–14.5)
GLUCOSE SERPL-MCNC: 105 MG/DL (ref 74–99)
HCT VFR BLD AUTO: 39.6 % (ref 36–46)
HGB BLD-MCNC: 12.6 G/DL (ref 12–16)
IMM GRANULOCYTES # BLD AUTO: 0.02 X10*3/UL (ref 0–0.7)
IMM GRANULOCYTES NFR BLD AUTO: 0.3 % (ref 0–0.9)
LYMPHOCYTES # BLD AUTO: 1.12 X10*3/UL (ref 1.2–4.8)
LYMPHOCYTES NFR BLD AUTO: 17.8 %
MAGNESIUM SERPL-MCNC: 1.96 MG/DL (ref 1.6–2.4)
MCH RBC QN AUTO: 31.7 PG (ref 26–34)
MCHC RBC AUTO-ENTMCNC: 31.8 G/DL (ref 32–36)
MCV RBC AUTO: 100 FL (ref 80–100)
MONOCYTES # BLD AUTO: 0.49 X10*3/UL (ref 0.1–1)
MONOCYTES NFR BLD AUTO: 7.8 %
NEUTROPHILS # BLD AUTO: 4.38 X10*3/UL (ref 1.2–7.7)
NEUTROPHILS NFR BLD AUTO: 69.5 %
NRBC BLD-RTO: 0 /100 WBCS (ref 0–0)
PLATELET # BLD AUTO: 166 X10*3/UL (ref 150–450)
POTASSIUM SERPL-SCNC: 4.4 MMOL/L (ref 3.5–5.3)
PROT SERPL-MCNC: 6.9 G/DL (ref 6.4–8.2)
RAD ONC MSQ ACTUAL FRACTIONS DELIVERED: 5
RAD ONC MSQ ACTUAL SESSION DELIVERED DOSE: 200 CGRAY
RAD ONC MSQ ACTUAL TOTAL DOSE: 1000 CGRAY
RAD ONC MSQ ELAPSED DAYS: 6
RAD ONC MSQ LAST DATE: NORMAL
RAD ONC MSQ PRESCRIBED FRACTIONAL DOSE: 200 CGRAY
RAD ONC MSQ PRESCRIBED NUMBER OF FRACTIONS: 35
RAD ONC MSQ PRESCRIBED TECHNIQUE: NORMAL
RAD ONC MSQ PRESCRIBED TOTAL DOSE: 7000 CGRAY
RAD ONC MSQ PRESCRIPTION PATTERN COMMENT: NORMAL
RAD ONC MSQ START DATE: NORMAL
RAD ONC MSQ TREATMENT COURSE NUMBER: 1
RAD ONC MSQ TREATMENT SITE: NORMAL
RBC # BLD AUTO: 3.97 X10*6/UL (ref 4–5.2)
SODIUM SERPL-SCNC: 140 MMOL/L (ref 136–145)
WBC # BLD AUTO: 6.3 X10*3/UL (ref 4.4–11.3)

## 2025-03-24 PROCEDURE — 83735 ASSAY OF MAGNESIUM: CPT

## 2025-03-24 PROCEDURE — 99214 OFFICE O/P EST MOD 30 MIN: CPT | Performed by: NURSE PRACTITIONER

## 2025-03-24 PROCEDURE — 2500000004 HC RX 250 GENERAL PHARMACY W/ HCPCS (ALT 636 FOR OP/ED): Performed by: STUDENT IN AN ORGANIZED HEALTH CARE EDUCATION/TRAINING PROGRAM

## 2025-03-24 PROCEDURE — 80053 COMPREHEN METABOLIC PANEL: CPT

## 2025-03-24 PROCEDURE — 96361 HYDRATE IV INFUSION ADD-ON: CPT | Mod: INF

## 2025-03-24 PROCEDURE — 99214 OFFICE O/P EST MOD 30 MIN: CPT | Mod: 25 | Performed by: NURSE PRACTITIONER

## 2025-03-24 PROCEDURE — 96413 CHEMO IV INFUSION 1 HR: CPT

## 2025-03-24 PROCEDURE — 77386 HC INTENSITY-MODULATED RADIATION THERAPY (IMRT), COMPLEX: CPT | Performed by: STUDENT IN AN ORGANIZED HEALTH CARE EDUCATION/TRAINING PROGRAM

## 2025-03-24 PROCEDURE — 1126F AMNT PAIN NOTED NONE PRSNT: CPT | Performed by: NURSE PRACTITIONER

## 2025-03-24 PROCEDURE — 96375 TX/PRO/DX INJ NEW DRUG ADDON: CPT | Mod: INF

## 2025-03-24 PROCEDURE — 85025 COMPLETE CBC W/AUTO DIFF WBC: CPT

## 2025-03-24 PROCEDURE — 96367 TX/PROPH/DG ADDL SEQ IV INF: CPT

## 2025-03-24 RX ORDER — FAMOTIDINE 10 MG/ML
20 INJECTION, SOLUTION INTRAVENOUS ONCE AS NEEDED
Status: DISCONTINUED | OUTPATIENT
Start: 2025-03-24 | End: 2025-03-24 | Stop reason: HOSPADM

## 2025-03-24 RX ORDER — PROCHLORPERAZINE MALEATE 10 MG
10 TABLET ORAL EVERY 6 HOURS PRN
Status: DISCONTINUED | OUTPATIENT
Start: 2025-03-24 | End: 2025-03-24 | Stop reason: HOSPADM

## 2025-03-24 RX ORDER — DIPHENHYDRAMINE HYDROCHLORIDE 50 MG/ML
50 INJECTION, SOLUTION INTRAMUSCULAR; INTRAVENOUS AS NEEDED
Status: DISCONTINUED | OUTPATIENT
Start: 2025-03-24 | End: 2025-03-24 | Stop reason: HOSPADM

## 2025-03-24 RX ORDER — EPINEPHRINE 0.3 MG/.3ML
0.3 INJECTION SUBCUTANEOUS EVERY 5 MIN PRN
Status: DISCONTINUED | OUTPATIENT
Start: 2025-03-24 | End: 2025-03-24 | Stop reason: HOSPADM

## 2025-03-24 RX ORDER — DEXAMETHASONE 6 MG/1
12 TABLET ORAL ONCE
Status: COMPLETED | OUTPATIENT
Start: 2025-03-24 | End: 2025-03-24

## 2025-03-24 RX ORDER — ALBUTEROL SULFATE 0.83 MG/ML
3 SOLUTION RESPIRATORY (INHALATION) AS NEEDED
Status: DISCONTINUED | OUTPATIENT
Start: 2025-03-24 | End: 2025-03-24 | Stop reason: HOSPADM

## 2025-03-24 RX ORDER — PALONOSETRON 0.05 MG/ML
0.25 INJECTION, SOLUTION INTRAVENOUS ONCE
Status: COMPLETED | OUTPATIENT
Start: 2025-03-24 | End: 2025-03-24

## 2025-03-24 RX ORDER — PROCHLORPERAZINE EDISYLATE 5 MG/ML
10 INJECTION INTRAMUSCULAR; INTRAVENOUS EVERY 6 HOURS PRN
Status: DISCONTINUED | OUTPATIENT
Start: 2025-03-24 | End: 2025-03-24 | Stop reason: HOSPADM

## 2025-03-24 RX ADMIN — FOSAPREPITANT 150 MG: 150 INJECTION, POWDER, LYOPHILIZED, FOR SOLUTION INTRAVENOUS at 10:03

## 2025-03-24 RX ADMIN — DEXAMETHASONE 12 MG: 6 TABLET ORAL at 10:03

## 2025-03-24 RX ADMIN — CISPLATIN 54 MG: 1 INJECTION, SOLUTION INTRAVENOUS at 10:37

## 2025-03-24 RX ADMIN — SODIUM CHLORIDE 1000 ML: 9 INJECTION, SOLUTION INTRAVENOUS at 11:37

## 2025-03-24 RX ADMIN — PALONOSETRON HYDROCHLORIDE 0.25 MG: 0.25 INJECTION INTRAVENOUS at 10:01

## 2025-03-24 RX ADMIN — POTASSIUM CHLORIDE 999 ML/HR: 2 INJECTION, SOLUTION, CONCENTRATE INTRAVENOUS at 08:49

## 2025-03-24 ASSESSMENT — PAIN SCALES - GENERAL
PAINLEVEL_OUTOF10: 0-NO PAIN
PAINLEVEL_OUTOF10: 0-NO PAIN

## 2025-03-24 NOTE — PATIENT INSTRUCTIONS
Michael will get in contact with you about if you should see general surgery or Gastroenterology on April 8th.   Call Ximena Copeland,our , if you have not received your portable oxygen tank by mid to late this week.

## 2025-03-24 NOTE — PROGRESS NOTES
Subjective:   Patient Name: Ingris Mckinney    : 1955     MRN: [id number]     Age: 69 y.o.     Gender: female  Referring Provider: ARNAV    CC: Management of newly diagnosed L sided supraglottic squamous cell carcinoma ([id number])    Presenting History (3/13/2025): At time of initial presentation a 69 y.o. female, current smoker (started at age 19,1/3 pack per day) with a past medical history of osteoporosis, HLD, COPD (occasionally on O2) referred for management of suprglottic cancer.      She has been following with ENT for history of vocal cord paralysis without CT abnormality since , which was successfully managed with injection medialization.     She then developed acute worsening of her voice in 2024, with scope exam shortly after that showed mucosal irregularity along the anterior left false cord. The left true vocal cord was fixed in the paramedian position. The right true vocal cord exhibited normal movement. She was taken to the operating room for direct and biopsy on 2025. Operative findings showed a left false cord mass lesion which was not obstructive, and extended from anterior to posterior. The mass extended to the laryngeal ventricle on the left, but spared the true vocal cord. The mass did not appear to cross to the right. The vallecula, base of tongue and hypopharynx were normal. Biopsies from the left supraglottic mass were positive for invasive moderately differentiated squamous cell carcinoma.     CT neck on 2025 showed an enhancing mass in the left supraglottic larynx, 1.0 x 1.7 x 1.4 cm in size, which approached the epiglottis without definitive invasion. There was an area of irregularity/thickening on the right true vocal cord of unclear etiology.     2025: PET/CT: 1.  Focal intense hypermetabolic activity at the left aryepiglottic fold compatible with biopsy-proven squamous cell carcinoma. 2. No evidence of hypermetabolic kevin or distant metastatic disease.3.  Mild hypermetabolic activity associated with the right upper lobe pulmonary nodule. Continued attention on follow-up chest CT is recommended.    She was seen by ENT Dr. Lo and unfortunately was deemed not surgically resectable. She presented to [facility] for a consultation accompanied by her brother in law. She livers on her own. She states that she has been lazy over the past months and has lost a lot of weight. She doesn't really eat much food other than cereals. Luckily her sister and in-law brought her dinner. She has gained 12 lb in the past weeks. She denies any dysphagia or odynophagia. NO pain. NO numbness or tingling. No heating issues. No fever or chills. No n/v/d/c    Shx: No etoh or illicit drugs.   2 sisters   No kids      Treatment Summary:  - March 18, 2025 C1D1 Cisplatin - weekly with concurrent RT     Interval History (3/24/2025):    Patient presents today for toxicity check and treatment readiness visit. She tolerated her first dose of Cisplatin very well. She has not experienced any side effects. She denies any fevers, chills or night sweats. Chronic cough - COPD. No chest pain. Chronic JONES interferes with ADLs. No nausea or vomiting. No constipation or diarrhea. No urinary symptoms. No rash. No neuropathy.     Review of Systems:  A review of systems has been completed and are negative for complaints except what is stated in the HPI and/or past medical history      Medical History:   Past Medical History:   Diagnosis Date    Anxiety     At risk for falling     COPD (chronic obstructive pulmonary disease) (Multi)     Hypercholesteremia     Hypoxia     Larynx cancer (Multi)     Malnutrition (Multi)     Personal history of other diseases of the respiratory system     History of chronic obstructive lung disease    Respiratory failure (Multi)     Tachycardia     Weakness        Surgical History:   Past Surgical History:   Procedure Laterality Date    CT GUIDED IMAGING FOR NEEDLE PLACEMENT  05/05/2020     CT GUIDED IMAGING FOR NEEDLE PLACEMENT [facility]    LARYNGOSCOPY      direct w/ bx    OTHER SURGICAL HISTORY  11/11/2022    Hip surgery    OTHER SURGICAL HISTORY  11/11/2022    Arm surgery    OTHER SURGICAL HISTORY  11/11/2022    Leg surgery       Family History:  Family History   Problem Relation Name Age of Onset    Other (chronic lung disease) Other         Allergies:  Allergies   Allergen Reactions    Scallops Anaphylaxis    Iodinated Contrast Media Itching       Meds (Current):    Current Outpatient Medications:     albuterol 2.5 mg /3 mL (0.083 %) nebulizer solution, Take 3 mL (2.5 mg) by nebulization every 4 hours if needed for wheezing., Disp: , Rfl:     albuterol 90 mcg/actuation inhaler, Inhale 2 puffs every 4 hours if needed., Disp: , Rfl:     alendronate (Fosamax) 70 mg tablet, Take 1 tablet (70 mg) by mouth every 7 days. Take in the morning with a full glass of water, on an empty stomach, and do not take anything else by mouth or lie down for the next 30 min., Disp: 12 tablet, Rfl: 3    dexAMETHasone (Decadron) 4 mg tablet, Take 2 tablets (8 mg) by mouth once daily. For 3 days per week starting the day after treatment., Disp: 42 tablet, Rfl: 0    Dulera 200-5 mcg/actuation inhaler, Inhale 2 puffs twice a day., Disp: , Rfl:     OLANZapine (ZyPREXA) 5 mg tablet, Take 1 tablet (5 mg) by mouth once daily at bedtime., Disp: 30 tablet, Rfl: 1    ondansetron (Zofran) 8 mg tablet, Take 1 tablet (8 mg) by mouth every 8 hours if needed for nausea or vomiting., Disp: 30 tablet, Rfl: 5    prochlorperazine (Compazine) 10 mg tablet, Take 1 tablet (10 mg) by mouth every 6 hours if needed for nausea or vomiting., Disp: 30 tablet, Rfl: 5    rosuvastatin (Crestor) 10 mg tablet, Take 1 tablet (10 mg) by mouth once daily., Disp: 90 tablet, Rfl: 2    umeclidinium (Incruse Ellipta) 62.5 mcg/actuation inhalation, Inhale 1 puff (62.5 mcg) once daily., Disp: , Rfl:   No current facility-administered medications for  this visit.    Facility-Administered Medications Ordered in Other Visits:     albuterol 2.5 mg /3 mL (0.083 %) nebulizer solution 3 mL, 3 mL, nebulization, PRN, Kimmie Waters MD MPH    CISplatin (Platinol) 54 mg in sodium chloride 0.9% 601 mL IV, 40 mg/m2 (Treatment Plan Recorded), intravenous, Once, Kimmie Waters MD MPH    dextrose 5 % in water (D5W) bolus 500 mL, 500 mL, intravenous, PRN, Kimmie Waters MD MPH    diphenhydrAMINE (BENADryl) injection 50 mg, 50 mg, intravenous, PRN, Kimmie Waters MD MPH    EPINEPHrine (Epipen) injection syringe 0.3 mg, 0.3 mg, intramuscular, q5 min PRN, Kimmie Waters MD MPH    famotidine PF (Pepcid) injection 20 mg, 20 mg, intravenous, Once PRN, Kimmie Waters MD MPH    fosaprepitant (Emend) 150 mg in sodium chloride 0.9% 250 mL IV, 150 mg, intravenous, Once, Kimmie Waters MD MPH, Last Rate: 500 mL/hr at 03/24/25 1003, 150 mg at 03/24/25 1003    methylPREDNISolone sod succinate (SOLU-Medrol) 40 mg/mL injection 40 mg, 40 mg, intravenous, PRN, Kimmie Waters MD MPH    prochlorperazine (Compazine) injection 10 mg, 10 mg, intravenous, q6h PRN, Kimmie Waters MD MPH    prochlorperazine (Compazine) tablet 10 mg, 10 mg, oral, q6h PRN, Kimmie Waters MD MPH    sodium chloride 0.9 % bolus 1,000 mL, 1,000 mL, intravenous, Once, Kimmie Waters MD MPH    sodium chloride 0.9 % bolus 500 mL, 500 mL, intravenous, PRN, Kimmie Waters MD MPH      Pain Assessment:  Pain is: 0    Performance Status (ECOG): 2  ECOG Definition  0 Fully active; no performance restrictions.  1 Strenuous physical activity restricted; fully ambulatory and able to carry out light work.  2 Capable of all self-care but unable to carry out any work activities. Up and about >50% of waking hours.  3 Capable of only limited self-care; confined to bed or chair >50% of waking hours.  4 Completely disabled; cannot carry out any self-care; totally confined to bed or chair.  Exam:    Vital Signs:  Pulse 98   Temp 36.3 °C (97.3 °F) (Temporal)   Resp 18   Wt (!) 42.8 kg (94 lb  7.5 oz)   SpO2 93%   BMI 17.38 kg/m²     Wt Readings from Last 5 Encounters:   25 (!) 42.8 kg (94 lb 7.5 oz)   25 (!) 42.8 kg (94 lb 7.5 oz)   25 (!) 43.5 kg (95 lb 14.4 oz)   25 (!) 43.5 kg (95 lb 12.6 oz)   25 (!) 40.9 kg (90 lb 0.9 oz)       Physical Exam:  ECO  Pain: 0  Constitutional: Well developed, awake/alert/oriented x3, no distress, alert and cooperative  Eyes: PER. sclera anicteric  ENMT: Oral mucosa moist, no lesions or thrush identified  Respiratory/Thorax: Breathing is non-labored. Lungs are clear to auscultation bilaterally. No adventitious breath sounds  Cardiovascular: S1-S2. Regular rate and rhythm. No murmurs, rubs, or gallops appreciated  Gastrointestinal: Abdomen soft nontender, nondistended, normal active bowel sounds.  Musculoskeletal: ROM intact, no joint swelling, normal strength  Extremities: normal extremities, no cyanosis, no edema, no clubbing  Lymphatics: no palpable lymphadenopathy   Skin: no rash  Neurologic: alert and oriented x3. Nonfocal exam. No myoclonus  Psychological: Pleasant, appropriate and easily engaged     Labs:  Lab Results   Component Value Date    WBC 6.3 2025    HGB 12.6 2025    HCT 39.6 2025     2025     2025      Lab Results   Component Value Date    NEUTROABS 4.38 2025      Lab Results   Component Value Date    GLUCOSE 105 (H) 2025    CALCIUM 9.8 2025     2025    K 4.4 2025    CO2 36 (H) 2025    CL 97 (L) 2025    BUN 28 (H) 2025    CREATININE 0.51 2025    MG 1.96 2025     Lab Results   Component Value Date    ALT 24 2025    AST 23 2025    ALKPHOS 40 2025    BILITOT 0.3 2025      Lab Results   Component Value Date    TSH 4.09 (H) 2025    FREET4 1.06 2025              Assessment/Plan:   69 y.o. female with past medical history as stated referred for management of supraglottic squamous  cell carcinoma.    The patient's records and imaging have been reviewed in detail.  I have discussed with the patient the natural history of their disease at length.  The following has also been discussed with the patient:    # Cancer:   L sided supraglottic squamous cell carcinoma ([id number]). Given that she is not surgically resectable. I thus recommend treatment of weekly cisplatin with concurrent RT for 7 weeks. Side effects of chenotherapy including but not limited to nausea, vomiting, diarrhea, anemia, thrombocytopenia,  leukopenia, neutropenic fever, kidney toxicities, liver toxicities, rash, infusion related reaction, secondary malignancies MDS or AML, and fertility impact, neuropathy and hearing impact. Consent signed. To start next week.     - Interval Imagin2025: PET/CT: 1.  Focal intense hypermetabolic activity at the left aryepiglottic fold compatible with biopsy-proven squamous cell carcinoma. 2. No evidence of hypermetabolic kevin or distant metastatic disease.3. Mild hypermetabolic activity associated with the right upper lobe pulmonary nodule. Continued attention on follow-up chest CT is recommended.    # Pulmonary nodule: will continue to follow.    # Clinical Trials:  None currently.     # Access: Peripheral veins.    # Pain: No acute pain.    # Bone Health: No signs of bony disease.     # Psychosocial: Coping well, no acute issues.  Good support from family & friends.  Social work support offered.    # Hearing: NO baseline hearing issues.    # Speech and swallow: Order for PEG Tube placement sent to [facility]    # GI/CINV: severe malnutrition. Nutrition following.    # Smoking: N/A, current smoker, working on quitting.    # Fertility: N/A.  Oncofertility support available as needed.      # Heme/ESAs: N/A.    #JONES: Has home oxygen ([employer]). Portable concentrator ordered. Oxygen 93% RA at rest. 88% walking short distance with assistance. 97% at rest on 2L via NC.     # GOC:  Patient is aware of curative intent goals of care.    # Language: English.    # Interdisciplinary Patient/Family Education Record:  Learner:  Patient.  Learning Needs Reviewed:  Treatments, Medications, Disease Process, Diagnostic Tests.  Barriers: None.  Teaching Method: Discussion, Handout(s).  Comprehension:  Verbalized Understanding.  Follow-up Plan:  Return to office as per MD.    # Dispo:   RTC in 1 week   Continue chemoRT - weekly Cisplatin.  Hydration weekly on Thursdays     Michael Fregoso, APRN-CNP  [facility]  Thoracic + [facility] Medical Oncology     I spent a total of 30+ minutes on the date of the service which included preparing to see the patient, face-to-face patient care, completing clinical documentation, obtaining and / or reviewing separately obtained history, counseling and educating the patient/family/caregiver, ordering medications, tests, or procedures, communicating with other healthcare providers (not separately reported), independently interpreting results, not separately reported, and communicating results to the patient/family/caregiver, Name and date of birth verified.

## 2025-03-25 ENCOUNTER — TELEPHONE (OUTPATIENT)
Dept: HEMATOLOGY/ONCOLOGY | Facility: CLINIC | Age: 70
End: 2025-03-25
Payer: MEDICARE

## 2025-03-25 ENCOUNTER — HOSPITAL ENCOUNTER (OUTPATIENT)
Dept: RADIATION ONCOLOGY | Facility: CLINIC | Age: 70
Setting detail: RADIATION/ONCOLOGY SERIES
Discharge: HOME | End: 2025-03-25
Payer: MEDICARE

## 2025-03-25 ENCOUNTER — APPOINTMENT (OUTPATIENT)
Dept: RADIATION ONCOLOGY | Facility: CLINIC | Age: 70
End: 2025-03-25
Payer: MEDICARE

## 2025-03-25 DIAGNOSIS — C32.8 MALIGNANT NEOPLASM OF OVERLAPPING SITES OF LARYNX (MULTI): ICD-10-CM

## 2025-03-25 DIAGNOSIS — Z51.0 ENCOUNTER FOR ANTINEOPLASTIC RADIATION THERAPY: ICD-10-CM

## 2025-03-25 LAB
RAD ONC MSQ ACTUAL FRACTIONS DELIVERED: 6
RAD ONC MSQ ACTUAL SESSION DELIVERED DOSE: 200 CGRAY
RAD ONC MSQ ACTUAL TOTAL DOSE: 1200 CGRAY
RAD ONC MSQ ELAPSED DAYS: 7
RAD ONC MSQ LAST DATE: NORMAL
RAD ONC MSQ PRESCRIBED FRACTIONAL DOSE: 200 CGRAY
RAD ONC MSQ PRESCRIBED NUMBER OF FRACTIONS: 35
RAD ONC MSQ PRESCRIBED TECHNIQUE: NORMAL
RAD ONC MSQ PRESCRIBED TOTAL DOSE: 7000 CGRAY
RAD ONC MSQ PRESCRIPTION PATTERN COMMENT: NORMAL
RAD ONC MSQ START DATE: NORMAL
RAD ONC MSQ TREATMENT COURSE NUMBER: 1
RAD ONC MSQ TREATMENT SITE: NORMAL

## 2025-03-25 PROCEDURE — 77386 HC INTENSITY-MODULATED RADIATION THERAPY (IMRT), COMPLEX: CPT | Performed by: STUDENT IN AN ORGANIZED HEALTH CARE EDUCATION/TRAINING PROGRAM

## 2025-03-25 PROCEDURE — 77336 RADIATION PHYSICS CONSULT: CPT | Performed by: STUDENT IN AN ORGANIZED HEALTH CARE EDUCATION/TRAINING PROGRAM

## 2025-03-26 ENCOUNTER — APPOINTMENT (OUTPATIENT)
Dept: PREADMISSION TESTING | Facility: HOSPITAL | Age: 70
End: 2025-03-26
Payer: MEDICARE

## 2025-03-26 ENCOUNTER — APPOINTMENT (OUTPATIENT)
Dept: RADIATION ONCOLOGY | Facility: CLINIC | Age: 70
End: 2025-03-26
Payer: MEDICARE

## 2025-03-26 ENCOUNTER — HOSPITAL ENCOUNTER (OUTPATIENT)
Dept: RADIATION ONCOLOGY | Facility: CLINIC | Age: 70
Setting detail: RADIATION/ONCOLOGY SERIES
Discharge: HOME | End: 2025-03-26
Payer: MEDICARE

## 2025-03-26 DIAGNOSIS — C32.8 MALIGNANT NEOPLASM OF OVERLAPPING SITES OF LARYNX (MULTI): ICD-10-CM

## 2025-03-26 DIAGNOSIS — Z51.0 ENCOUNTER FOR ANTINEOPLASTIC RADIATION THERAPY: ICD-10-CM

## 2025-03-26 LAB
RAD ONC MSQ ACTUAL FRACTIONS DELIVERED: 7
RAD ONC MSQ ACTUAL SESSION DELIVERED DOSE: 200 CGRAY
RAD ONC MSQ ACTUAL TOTAL DOSE: 1400 CGRAY
RAD ONC MSQ ELAPSED DAYS: 8
RAD ONC MSQ LAST DATE: NORMAL
RAD ONC MSQ PRESCRIBED FRACTIONAL DOSE: 200 CGRAY
RAD ONC MSQ PRESCRIBED NUMBER OF FRACTIONS: 35
RAD ONC MSQ PRESCRIBED TECHNIQUE: NORMAL
RAD ONC MSQ PRESCRIBED TOTAL DOSE: 7000 CGRAY
RAD ONC MSQ PRESCRIPTION PATTERN COMMENT: NORMAL
RAD ONC MSQ START DATE: NORMAL
RAD ONC MSQ TREATMENT COURSE NUMBER: 1
RAD ONC MSQ TREATMENT SITE: NORMAL

## 2025-03-26 PROCEDURE — 77386 HC INTENSITY-MODULATED RADIATION THERAPY (IMRT), COMPLEX: CPT | Performed by: STUDENT IN AN ORGANIZED HEALTH CARE EDUCATION/TRAINING PROGRAM

## 2025-03-27 ENCOUNTER — HOSPITAL ENCOUNTER (OUTPATIENT)
Dept: RADIATION ONCOLOGY | Facility: CLINIC | Age: 70
Setting detail: RADIATION/ONCOLOGY SERIES
Discharge: HOME | End: 2025-03-27
Payer: MEDICARE

## 2025-03-27 ENCOUNTER — APPOINTMENT (OUTPATIENT)
Dept: RADIATION ONCOLOGY | Facility: CLINIC | Age: 70
End: 2025-03-27
Payer: MEDICARE

## 2025-03-27 ENCOUNTER — INFUSION (OUTPATIENT)
Dept: HEMATOLOGY/ONCOLOGY | Facility: CLINIC | Age: 70
End: 2025-03-27
Payer: MEDICARE

## 2025-03-27 VITALS
RESPIRATION RATE: 18 BRPM | HEART RATE: 93 BPM | OXYGEN SATURATION: 95 % | SYSTOLIC BLOOD PRESSURE: 120 MMHG | TEMPERATURE: 97.2 F | DIASTOLIC BLOOD PRESSURE: 73 MMHG

## 2025-03-27 VITALS
BODY MASS INDEX: 18.7 KG/M2 | WEIGHT: 101.63 LBS | RESPIRATION RATE: 18 BRPM | OXYGEN SATURATION: 97 % | SYSTOLIC BLOOD PRESSURE: 154 MMHG | DIASTOLIC BLOOD PRESSURE: 76 MMHG | TEMPERATURE: 97.5 F | HEART RATE: 78 BPM

## 2025-03-27 DIAGNOSIS — C32.1 PRIMARY SQUAMOUS CELL CARCINOMA OF SUPRAGLOTTIS: ICD-10-CM

## 2025-03-27 DIAGNOSIS — C32.8 MALIGNANT NEOPLASM OF OVERLAPPING SITES OF LARYNX (MULTI): ICD-10-CM

## 2025-03-27 DIAGNOSIS — Z51.0 ENCOUNTER FOR ANTINEOPLASTIC RADIATION THERAPY: ICD-10-CM

## 2025-03-27 LAB
RAD ONC MSQ ACTUAL FRACTIONS DELIVERED: 8
RAD ONC MSQ ACTUAL SESSION DELIVERED DOSE: 200 CGRAY
RAD ONC MSQ ACTUAL TOTAL DOSE: 1600 CGRAY
RAD ONC MSQ ELAPSED DAYS: 9
RAD ONC MSQ LAST DATE: NORMAL
RAD ONC MSQ PRESCRIBED FRACTIONAL DOSE: 200 CGRAY
RAD ONC MSQ PRESCRIBED NUMBER OF FRACTIONS: 35
RAD ONC MSQ PRESCRIBED TECHNIQUE: NORMAL
RAD ONC MSQ PRESCRIBED TOTAL DOSE: 7000 CGRAY
RAD ONC MSQ PRESCRIPTION PATTERN COMMENT: NORMAL
RAD ONC MSQ START DATE: NORMAL
RAD ONC MSQ TREATMENT COURSE NUMBER: 1
RAD ONC MSQ TREATMENT SITE: NORMAL

## 2025-03-27 PROCEDURE — 2500000004 HC RX 250 GENERAL PHARMACY W/ HCPCS (ALT 636 FOR OP/ED): Performed by: STUDENT IN AN ORGANIZED HEALTH CARE EDUCATION/TRAINING PROGRAM

## 2025-03-27 PROCEDURE — 96360 HYDRATION IV INFUSION INIT: CPT | Mod: INF

## 2025-03-27 PROCEDURE — 77386 HC INTENSITY-MODULATED RADIATION THERAPY (IMRT), COMPLEX: CPT | Performed by: STUDENT IN AN ORGANIZED HEALTH CARE EDUCATION/TRAINING PROGRAM

## 2025-03-27 RX ADMIN — SODIUM CHLORIDE 1000 ML: 9 INJECTION, SOLUTION INTRAVENOUS at 09:07

## 2025-03-27 ASSESSMENT — ENCOUNTER SYMPTOMS
LOSS OF SENSATION IN FEET: 1
OCCASIONAL FEELINGS OF UNSTEADINESS: 0
DEPRESSION: 0

## 2025-03-27 ASSESSMENT — COLUMBIA-SUICIDE SEVERITY RATING SCALE - C-SSRS
2. HAVE YOU ACTUALLY HAD ANY THOUGHTS OF KILLING YOURSELF?: NO
1. IN THE PAST MONTH, HAVE YOU WISHED YOU WERE DEAD OR WISHED YOU COULD GO TO SLEEP AND NOT WAKE UP?: NO
6. HAVE YOU EVER DONE ANYTHING, STARTED TO DO ANYTHING, OR PREPARED TO DO ANYTHING TO END YOUR LIFE?: NO

## 2025-03-27 ASSESSMENT — PAIN SCALES - GENERAL
PAINLEVEL_OUTOF10: 0-NO PAIN
PAINLEVEL_OUTOF10: 0-NO PAIN

## 2025-03-27 NOTE — PROGRESS NOTES
Radiation Oncology On Treatment Visit    Patient Name:  Ingris Mckinney  MRN:  [id number]  :  1955    Referring Provider: No ref. provider found  Primary Care Provider: Marge Camacho MD  Care Team: Patient Care Team:  Marge Camacho MD as PCP - General (Internal Medicine)  Ngoc Selby MD as PCP - [employer] PCP  Noemy Rivas DO as Radiation Oncologist (Radiation Oncology)  Tamiko Mahoney RN as Registered Nurse (Radiation Therapy)  Daphney Loo RN as Nurse Navigator (Hematology and Oncology)  Kimmie Waters MD MPH as Consulting Physician (Hematology and Oncology)    Date of Service: 3/27/2025     Diagnosis:   Specialty Problems          Radiation Oncology Problems    Primary squamous cell carcinoma of supraglottis (Multi)         Treatment Summary:  Radiation Therapy    Treatment Period Technique Fraction Dose Fractions Total Dose   Course 1 3/18/2025-3/27/2025  (days elapsed: 9)         H&N 3/18/2025-3/27/2025 VMAT 200 / 200 cGy  1600 / 7,000 cGy     SUBJECTIVE: Tolerating well. Denies any significant pain or dysphagia. Still tolerating PO diet. Down 1 lb from last week and will need to be closely monitored. Will have our dietitian reach out again.     OBJECTIVE:   Vital Signs:  /76   Pulse 78   Temp 36.4 °C (97.5 °F) (Temporal)   Resp 18   Wt 46.1 kg (101 lb 10.1 oz)   SpO2 97%   BMI 18.70 kg/m²     Daily Weight  25 : 46.1 kg (101 lb 10.1 oz)  25 : (!) 42.8 kg (94 lb 7.5 oz)  25 : (!) 42.8 kg (94 lb 7.5 oz)  25 : (!) 43.5 kg (95 lb 14.4 oz)  25 : (!) 43.5 kg (95 lb 12.6 oz)  25 : (!) 40.9 kg (90 lb 0.9 oz)  25 : (!) 42.2 kg (93 lb 0.6 oz)  25 : (!) 40.9 kg (90 lb 2.7 oz)  03/10/25 : (!) 41.7 kg (92 lb 0.7 oz)  25 : (!) 35.4 kg (78 lb)     Other Pertinent Findings:     Toxicity Assessment          3/20/2025    10:44 3/20/2025    10:46 3/27/2025    11:14   Toxicity Assessment   Treatment  and neck  Head  and neck   Anorexia Grade 0       eating regular food plus 2 anna calorie Boosts/day Grade 0       drinking 2 high calorie Boosts/day and eating regular foods Grade 0       Drinking 2 high protein shakes/day and eating what she wants   Anxiety  Grade 1 Grade 0   Dehydration  Grade 0 Grade 0   Depression  Grade 0 Grade 0   Dermatitis Radiation  Grade 0 Grade 0   Diarrhea  Grade 1       with anxiety Grade 0   Fatigue  Grade 1 Grade 1       naps during the day   Nausea  Grade 0 Grade 0   Pain  Grade 0 Grade 0   Vomiting  Grade 0 Grade 0   Dysphagia  Grade 0 Grade 0   Esophagitis  Grade 0 Grade 0   Mucositis Oral  Grade 0       using salt and soda rinse and Blis lozenges Grade 0   Hearing Impaired  Grade 0 Grade 0   Blurred Vision  Grade 0 Grade 0   Dry Eye  Grade 0 Grade 0   Eye Pain  Grade 0 Grade 0   Dry Mouth  Grade 0 Grade 1   Ear Pain  Grade 0 Grade 0   External Ear Pain  Grade 0 Grade 0   Tinnitus  Grade 0 Grade 0   Oral Pain  Grade 0 Grade 0   Salivary Duct Inflammation   Grade 0   Edema Face  Grade 0 Grade 0   Neck Edema   Grade 0   Trismus  Grade 0 Grade 0   Dysarthria  Grade 0 Grade 0   Dysesthesia  Grade 0 Grade 0   Dysgeusia  Grade 0 Grade 0   Aspiration  Grade 0 Grade 0   Hoarseness  Grade 2 Grade 1       baseline   Voice Alteration  Grade 2 Grade 1        Assessment / Plan:  The patient is tolerating radiation therapy as anticipated.  Continue per current treatment plan.

## 2025-03-28 ENCOUNTER — HOSPITAL ENCOUNTER (OUTPATIENT)
Dept: RADIATION ONCOLOGY | Facility: CLINIC | Age: 70
Setting detail: RADIATION/ONCOLOGY SERIES
Discharge: HOME | End: 2025-03-28
Payer: MEDICARE

## 2025-03-28 ENCOUNTER — APPOINTMENT (OUTPATIENT)
Dept: RADIATION ONCOLOGY | Facility: CLINIC | Age: 70
End: 2025-03-28
Payer: MEDICARE

## 2025-03-28 ENCOUNTER — SOCIAL WORK (OUTPATIENT)
Dept: CASE MANAGEMENT | Facility: HOSPITAL | Age: 70
End: 2025-03-28
Payer: MEDICARE

## 2025-03-28 DIAGNOSIS — C32.8 MALIGNANT NEOPLASM OF OVERLAPPING SITES OF LARYNX (MULTI): ICD-10-CM

## 2025-03-28 DIAGNOSIS — Z51.0 ENCOUNTER FOR ANTINEOPLASTIC RADIATION THERAPY: ICD-10-CM

## 2025-03-28 LAB
RAD ONC MSQ ACTUAL FRACTIONS DELIVERED: 9
RAD ONC MSQ ACTUAL SESSION DELIVERED DOSE: 200 CGRAY
RAD ONC MSQ ACTUAL TOTAL DOSE: 1800 CGRAY
RAD ONC MSQ ELAPSED DAYS: 10
RAD ONC MSQ LAST DATE: NORMAL
RAD ONC MSQ PRESCRIBED FRACTIONAL DOSE: 200 CGRAY
RAD ONC MSQ PRESCRIBED NUMBER OF FRACTIONS: 35
RAD ONC MSQ PRESCRIBED TECHNIQUE: NORMAL
RAD ONC MSQ PRESCRIBED TOTAL DOSE: 7000 CGRAY
RAD ONC MSQ PRESCRIPTION PATTERN COMMENT: NORMAL
RAD ONC MSQ START DATE: NORMAL
RAD ONC MSQ TREATMENT COURSE NUMBER: 1
RAD ONC MSQ TREATMENT SITE: NORMAL

## 2025-03-28 PROCEDURE — 77386 HC INTENSITY-MODULATED RADIATION THERAPY (IMRT), COMPLEX: CPT | Performed by: STUDENT IN AN ORGANIZED HEALTH CARE EDUCATION/TRAINING PROGRAM

## 2025-03-28 NOTE — PROGRESS NOTES
DAIANA spoke to patient on 3/27/25. Se has not yet received her portable concentrator. DAIANA called back on 3/24 to inquire and called back today. Order went in to [facility] as they provide her current oxygen. DAIANA is not sure what the hold up was but [facility] called Pt immediately and set up a delivery of this devices.

## 2025-03-31 ENCOUNTER — APPOINTMENT (OUTPATIENT)
Dept: RADIATION ONCOLOGY | Facility: CLINIC | Age: 70
End: 2025-03-31
Payer: MEDICARE

## 2025-03-31 ENCOUNTER — INFUSION (OUTPATIENT)
Dept: HEMATOLOGY/ONCOLOGY | Facility: CLINIC | Age: 70
End: 2025-03-31
Payer: MEDICARE

## 2025-03-31 ENCOUNTER — NUTRITION (OUTPATIENT)
Dept: HEMATOLOGY/ONCOLOGY | Facility: CLINIC | Age: 70
End: 2025-03-31
Payer: MEDICARE

## 2025-03-31 ENCOUNTER — HOSPITAL ENCOUNTER (OUTPATIENT)
Dept: RADIATION ONCOLOGY | Facility: CLINIC | Age: 70
Setting detail: RADIATION/ONCOLOGY SERIES
Discharge: HOME | End: 2025-03-31
Payer: MEDICARE

## 2025-03-31 ENCOUNTER — OFFICE VISIT (OUTPATIENT)
Dept: HEMATOLOGY/ONCOLOGY | Facility: CLINIC | Age: 70
End: 2025-03-31
Payer: MEDICARE

## 2025-03-31 VITALS
DIASTOLIC BLOOD PRESSURE: 64 MMHG | SYSTOLIC BLOOD PRESSURE: 107 MMHG | WEIGHT: 94.8 LBS | BODY MASS INDEX: 17.44 KG/M2 | HEART RATE: 99 BPM | OXYGEN SATURATION: 99 % | TEMPERATURE: 96.6 F | RESPIRATION RATE: 18 BRPM

## 2025-03-31 VITALS
BODY MASS INDEX: 17.44 KG/M2 | RESPIRATION RATE: 18 BRPM | DIASTOLIC BLOOD PRESSURE: 64 MMHG | HEART RATE: 99 BPM | WEIGHT: 94.8 LBS | OXYGEN SATURATION: 99 % | TEMPERATURE: 96.6 F | SYSTOLIC BLOOD PRESSURE: 107 MMHG

## 2025-03-31 DIAGNOSIS — C32.1 PRIMARY SQUAMOUS CELL CARCINOMA OF SUPRAGLOTTIS: Primary | ICD-10-CM

## 2025-03-31 DIAGNOSIS — C32.1 PRIMARY SQUAMOUS CELL CARCINOMA OF SUPRAGLOTTIS: ICD-10-CM

## 2025-03-31 DIAGNOSIS — Z51.0 ENCOUNTER FOR ANTINEOPLASTIC RADIATION THERAPY: ICD-10-CM

## 2025-03-31 DIAGNOSIS — E43 SEVERE PROTEIN-CALORIE MALNUTRITION (MULTI): ICD-10-CM

## 2025-03-31 DIAGNOSIS — C32.8 MALIGNANT NEOPLASM OF OVERLAPPING SITES OF LARYNX (MULTI): ICD-10-CM

## 2025-03-31 LAB
ALBUMIN SERPL BCP-MCNC: 4 G/DL (ref 3.4–5)
ALP SERPL-CCNC: 37 U/L (ref 33–136)
ALT SERPL W P-5'-P-CCNC: 18 U/L (ref 7–45)
ANION GAP SERPL CALC-SCNC: 10 MMOL/L (ref 10–20)
AST SERPL W P-5'-P-CCNC: 19 U/L (ref 9–39)
BASOPHILS # BLD AUTO: 0.02 X10*3/UL (ref 0–0.1)
BASOPHILS NFR BLD AUTO: 0.2 %
BILIRUB SERPL-MCNC: 0.5 MG/DL (ref 0–1.2)
BUN SERPL-MCNC: 16 MG/DL (ref 6–23)
CALCIUM SERPL-MCNC: 9.2 MG/DL (ref 8.6–10.3)
CHLORIDE SERPL-SCNC: 99 MMOL/L (ref 98–107)
CO2 SERPL-SCNC: 36 MMOL/L (ref 21–32)
CREAT SERPL-MCNC: 0.46 MG/DL (ref 0.5–1.05)
EGFRCR SERPLBLD CKD-EPI 2021: >90 ML/MIN/1.73M*2
EOSINOPHIL # BLD AUTO: 0.27 X10*3/UL (ref 0–0.7)
EOSINOPHIL NFR BLD AUTO: 3.3 %
ERYTHROCYTE [DISTWIDTH] IN BLOOD BY AUTOMATED COUNT: 12.8 % (ref 11.5–14.5)
GLUCOSE SERPL-MCNC: 111 MG/DL (ref 74–99)
HCT VFR BLD AUTO: 36.8 % (ref 36–46)
HGB BLD-MCNC: 12 G/DL (ref 12–16)
IMM GRANULOCYTES # BLD AUTO: 0.02 X10*3/UL (ref 0–0.7)
IMM GRANULOCYTES NFR BLD AUTO: 0.2 % (ref 0–0.9)
LYMPHOCYTES # BLD AUTO: 0.93 X10*3/UL (ref 1.2–4.8)
LYMPHOCYTES NFR BLD AUTO: 11.5 %
MAGNESIUM SERPL-MCNC: 1.95 MG/DL (ref 1.6–2.4)
MCH RBC QN AUTO: 32.1 PG (ref 26–34)
MCHC RBC AUTO-ENTMCNC: 32.6 G/DL (ref 32–36)
MCV RBC AUTO: 98 FL (ref 80–100)
MONOCYTES # BLD AUTO: 0.41 X10*3/UL (ref 0.1–1)
MONOCYTES NFR BLD AUTO: 5.1 %
NEUTROPHILS # BLD AUTO: 6.43 X10*3/UL (ref 1.2–7.7)
NEUTROPHILS NFR BLD AUTO: 79.7 %
NRBC BLD-RTO: 0 /100 WBCS (ref 0–0)
PLATELET # BLD AUTO: 154 X10*3/UL (ref 150–450)
POTASSIUM SERPL-SCNC: 4 MMOL/L (ref 3.5–5.3)
PROT SERPL-MCNC: 6.4 G/DL (ref 6.4–8.2)
RAD ONC MSQ ACTUAL FRACTIONS DELIVERED: 10
RAD ONC MSQ ACTUAL SESSION DELIVERED DOSE: 200 CGRAY
RAD ONC MSQ ACTUAL TOTAL DOSE: 2000 CGRAY
RAD ONC MSQ ELAPSED DAYS: 13
RAD ONC MSQ LAST DATE: NORMAL
RAD ONC MSQ PRESCRIBED FRACTIONAL DOSE: 200 CGRAY
RAD ONC MSQ PRESCRIBED NUMBER OF FRACTIONS: 35
RAD ONC MSQ PRESCRIBED TECHNIQUE: NORMAL
RAD ONC MSQ PRESCRIBED TOTAL DOSE: 7000 CGRAY
RAD ONC MSQ PRESCRIPTION PATTERN COMMENT: NORMAL
RAD ONC MSQ START DATE: NORMAL
RAD ONC MSQ TREATMENT COURSE NUMBER: 1
RAD ONC MSQ TREATMENT SITE: NORMAL
RBC # BLD AUTO: 3.74 X10*6/UL (ref 4–5.2)
SODIUM SERPL-SCNC: 141 MMOL/L (ref 136–145)
WBC # BLD AUTO: 8.1 X10*3/UL (ref 4.4–11.3)

## 2025-03-31 PROCEDURE — 85025 COMPLETE CBC W/AUTO DIFF WBC: CPT

## 2025-03-31 PROCEDURE — 80053 COMPREHEN METABOLIC PANEL: CPT

## 2025-03-31 PROCEDURE — 96367 TX/PROPH/DG ADDL SEQ IV INF: CPT

## 2025-03-31 PROCEDURE — 96413 CHEMO IV INFUSION 1 HR: CPT

## 2025-03-31 PROCEDURE — 99214 OFFICE O/P EST MOD 30 MIN: CPT | Performed by: NURSE PRACTITIONER

## 2025-03-31 PROCEDURE — 3074F SYST BP LT 130 MM HG: CPT | Performed by: NURSE PRACTITIONER

## 2025-03-31 PROCEDURE — 96375 TX/PRO/DX INJ NEW DRUG ADDON: CPT | Mod: INF

## 2025-03-31 PROCEDURE — 3078F DIAST BP <80 MM HG: CPT | Performed by: NURSE PRACTITIONER

## 2025-03-31 PROCEDURE — 77386 HC INTENSITY-MODULATED RADIATION THERAPY (IMRT), COMPLEX: CPT | Performed by: STUDENT IN AN ORGANIZED HEALTH CARE EDUCATION/TRAINING PROGRAM

## 2025-03-31 PROCEDURE — 2500000004 HC RX 250 GENERAL PHARMACY W/ HCPCS (ALT 636 FOR OP/ED): Performed by: STUDENT IN AN ORGANIZED HEALTH CARE EDUCATION/TRAINING PROGRAM

## 2025-03-31 PROCEDURE — 96361 HYDRATE IV INFUSION ADD-ON: CPT | Mod: INF

## 2025-03-31 PROCEDURE — 99214 OFFICE O/P EST MOD 30 MIN: CPT | Mod: 25 | Performed by: NURSE PRACTITIONER

## 2025-03-31 PROCEDURE — 83735 ASSAY OF MAGNESIUM: CPT

## 2025-03-31 RX ORDER — DEXAMETHASONE 6 MG/1
12 TABLET ORAL ONCE
Status: COMPLETED | OUTPATIENT
Start: 2025-03-31 | End: 2025-03-31

## 2025-03-31 RX ORDER — ALBUTEROL SULFATE 0.83 MG/ML
3 SOLUTION RESPIRATORY (INHALATION) AS NEEDED
Status: DISCONTINUED | OUTPATIENT
Start: 2025-03-31 | End: 2025-03-31 | Stop reason: HOSPADM

## 2025-03-31 RX ORDER — FAMOTIDINE 10 MG/ML
20 INJECTION, SOLUTION INTRAVENOUS ONCE AS NEEDED
Status: DISCONTINUED | OUTPATIENT
Start: 2025-03-31 | End: 2025-03-31 | Stop reason: HOSPADM

## 2025-03-31 RX ORDER — PROCHLORPERAZINE MALEATE 10 MG
10 TABLET ORAL EVERY 6 HOURS PRN
Status: DISCONTINUED | OUTPATIENT
Start: 2025-03-31 | End: 2025-03-31 | Stop reason: HOSPADM

## 2025-03-31 RX ORDER — PALONOSETRON 0.05 MG/ML
0.25 INJECTION, SOLUTION INTRAVENOUS ONCE
Status: COMPLETED | OUTPATIENT
Start: 2025-03-31 | End: 2025-03-31

## 2025-03-31 RX ORDER — PROCHLORPERAZINE EDISYLATE 5 MG/ML
10 INJECTION INTRAMUSCULAR; INTRAVENOUS EVERY 6 HOURS PRN
Status: DISCONTINUED | OUTPATIENT
Start: 2025-03-31 | End: 2025-03-31 | Stop reason: HOSPADM

## 2025-03-31 RX ORDER — EPINEPHRINE 0.3 MG/.3ML
0.3 INJECTION SUBCUTANEOUS EVERY 5 MIN PRN
Status: DISCONTINUED | OUTPATIENT
Start: 2025-03-31 | End: 2025-03-31 | Stop reason: HOSPADM

## 2025-03-31 RX ORDER — DIPHENHYDRAMINE HYDROCHLORIDE 50 MG/ML
50 INJECTION, SOLUTION INTRAMUSCULAR; INTRAVENOUS AS NEEDED
Status: DISCONTINUED | OUTPATIENT
Start: 2025-03-31 | End: 2025-03-31 | Stop reason: HOSPADM

## 2025-03-31 RX ADMIN — DEXAMETHASONE 12 MG: 6 TABLET ORAL at 09:52

## 2025-03-31 RX ADMIN — FOSAPREPITANT 150 MG: 150 INJECTION, POWDER, LYOPHILIZED, FOR SOLUTION INTRAVENOUS at 09:52

## 2025-03-31 RX ADMIN — CISPLATIN 54 MG: 1 INJECTION, SOLUTION INTRAVENOUS at 10:28

## 2025-03-31 RX ADMIN — PALONOSETRON HYDROCHLORIDE 0.25 MG: 0.25 INJECTION INTRAVENOUS at 09:50

## 2025-03-31 RX ADMIN — SODIUM CHLORIDE 1000 ML: 9 INJECTION, SOLUTION INTRAVENOUS at 11:28

## 2025-03-31 RX ADMIN — POTASSIUM CHLORIDE 999 ML/HR: 2 INJECTION, SOLUTION, CONCENTRATE INTRAVENOUS at 08:37

## 2025-03-31 ASSESSMENT — PAIN SCALES - GENERAL: PAINLEVEL_OUTOF10: 0-NO PAIN

## 2025-03-31 NOTE — PROGRESS NOTES
NUTRITION Follow Up NOTE    Nutrition Assessment     Reason for Visit:  Ingris Mckinney is a 69 y.o. female who presents for primary SCC supraglottis  Pt in for radiation consult, asked to see 2/2 dx, weight status, weight loss    TX: Cisplatin with concurrent radiation  Patient lives in [place] alone, Zulema lives in [place], other sister in [place] area. Sisters help pt with buying groceries etc    Contact Zulema Mckinney [phone]    Her insurance company provides transportation  3/31-  FUV in infusion. Week 2  Received Boost C from [facility]  Pt is a little quiet, not quite teary but obviously overwhelmed with her medical condition, treatment  Per Michael WOMACK pt will have to have a surgical consult for PEG placement, no date yet. [facility] does not place PEGs     Patient Active Problem List   Diagnosis    Acute on chronic respiratory failure with hypoxia and hypercapnia (Multi)    Anxiety    Balance disorder    Blood loss anemia    Closed fracture of pelvis (Multi)    Closed fracture of right inferior pubic ramus    Degenerative joint disease (DJD) of hip    Depression    Essential hypertension    ETOH abuse    Hyperlipidemia    Hypoxemia    Centrilobular emphysema (Multi)    COPD with exacerbation (Multi)    Severe protein-calorie malnutrition (Multi)    Lesion of larynx    Primary squamous cell carcinoma of supraglottis (Multi)       Nutrition Significant Labs:  Lab Results   Component Value Date/Time    GLUCOSE 111 (H) 03/31/2025 0733     03/31/2025 0733    K 4.0 03/31/2025 0733    CL 99 03/31/2025 0733    CO2 36 (H) 03/31/2025 0733    ANIONGAP 10 03/31/2025 0733    BUN 16 03/31/2025 0733    CREATININE 0.46 (L) 03/31/2025 0733    EGFR >90 03/31/2025 0733    CALCIUM 9.2 03/31/2025 0733    ALBUMIN 4.0 03/31/2025 0733    ALKPHOS 37 03/31/2025 0733    PROT 6.4 03/31/2025 0733    AST 19 03/31/2025 0733    BILITOT 0.5 03/31/2025 0733    ALT 18 03/31/2025 0733    MG 1.95 03/31/2025 0733     No results found for:  "\"VITD25\"      Anthropometrics:                         Daily Weight  03/31/25 : (!) 43 kg (94 lb 12.8 oz)  03/31/25 : (!) 43 kg (94 lb 12.8 oz)  03/27/25 : 46.1 kg (101 lb 10.1 oz)  03/24/25 : (!) 42.8 kg (94 lb 7.5 oz)  03/24/25 : (!) 42.8 kg (94 lb 7.5 oz)  03/20/25 : (!) 43.5 kg (95 lb 14.4 oz)  03/20/25 : (!) 43.5 kg (95 lb 12.6 oz)  03/18/25 : (!) 40.9 kg (90 lb 0.9 oz)  03/17/25 : (!) 42.2 kg (93 lb 0.6 oz)  03/13/25 : (!) 40.9 kg (90 lb 2.7 oz)  03/10/25 : (!) 41.7 kg (92 lb 0.7 oz)  03/05/25 : (!) 35.4 kg (78 lb)  02/10/25 : (!) 35.6 kg (78 lb 7.7 oz)  02/10/25 : (!) 35.6 kg (78 lb 7.7 oz)  01/24/25 : (!) 38.2 kg (84 lb 3.2 oz)  01/13/25 : (!) 39 kg (86 lb)  12/30/24 : (!) 39.2 kg (86 lb 6.4 oz)  12/09/24 : (!) 3.629 kg (8 lb)  10/18/24 : (!) 42.2 kg (93 lb)  09/23/24 : (!) 42.2 kg (93 lb)     3/20- [facility] wt 43.4kg / 95#    Weight Change %:       Nutrition History:  Food & Nutrition History:    Food Allergies:    Food Intolerances:    Vitamin/mineral intake:       Herbal supplements:    Medication and Complementary/Alternative Medicine Use:    Dentition:    Sleep Habits:      Diet Recall:  Meal 1:    Snack 1:    Meal 2:    Snack 2:    Meal 3: Not eating [employer] anymore, has frozen meals sent from insurance company. Taking one /day, says she is going to try to eat 2/day  Snack 3: will have a spoonfull of PNB, ice cream, not much snacking  Food Variety:    Oral Nutrition Supplement Use: Oral Nutrition Supplements: Boost Very High Calorie Frequency: BID (yesterday only had 1. Tries to take 2/day) Calories: 530 calories each container Protein: 22 grams each container  Fluid Intake:    Energy Intake: Fair 50-75 %, Good > 75 %    Food Preparation:  Cooking: Patient, Family  Grocery Shopping: Family  Dining Out:      Medications:  Current Outpatient Medications   Medication Instructions    albuterol 90 mcg/actuation inhaler 2 puffs, Every 4 hours PRN    albuterol 2.5 mg, Every 4 hours PRN    alendronate " (FOSAMAX) 70 mg, oral, Every 7 days, Take in the morning with a full glass of water, on an empty stomach, and do not take anything else by mouth or lie down for the next 30 min.    dexAMETHasone (DECADRON) 8 mg, oral, Daily, For 3 days per week starting the day after treatment.    Dulera 200-5 mcg/actuation inhaler 2 puffs, 2 times daily    OLANZapine (ZYPREXA) 5 mg, oral, Nightly    ondansetron (ZOFRAN) 8 mg, oral, Every 8 hours PRN    prochlorperazine (COMPAZINE) 10 mg, oral, Every 6 hours PRN    rosuvastatin (CRESTOR) 10 mg, oral, Daily    umeclidinium (Incruse Ellipta) 62.5 mcg/actuation inhalation 1 puff, Daily RT       Nutrition Focused Physical Exam Findings:3/31/25    Subcutaneous Fat Loss:   Orbital Fat Pads: Severe (dark circles, hollowing and loose skin)  Buccal Fat Pads: Severe (hollow, sunken and narrow face)  Triceps: Severe (negligible fat tissue)  Ribs: Severe (depression between the ribs very apparent, iliac crest very prominent)    Muscle Wasting:  Temporalis: Severe (hollowed scooping depression)  Pectoralis (Clavicular Region): Severe (protruding prominent clavicle)  Deltoid/Trapezius: Severe (squared shoulders, acromion process prominent)  Interosseous: Defer  Trapezius/Infraspinatus/Supraspinatus (Scapular Region): Severe (prominent visual scapula, depression between ribs, scapula or shoulder)  Quadriceps: Severe (depressions on inner and outer thigh)  Gastrocnemius: Severe (minimal muscle definition)    Physical Findings:  Hair: Negative  Eyes: Negative  Nails: Negative  Skin: Negative  Digestive System Findings: Constipation (unsure what Michael recommended for constipation, spoke with Michael who will be bringing over a printed bowel regime for pt, Miralax)  Mouth Findings:  (+ s/s rinses, probiotic lozenges)       Estimated Needs:            Total Energy Estimated Needs in 24 hours (kCal): 1250 kCal  Energy Estimated Needs per kg Body Weight in 24 hours (kCal/kg): 35 kCal/kg (actual BW used for  assessment weight)  Total Protein Estimated Needs in 24 Hours (g): 53 g  Protein Estimated Needs per kg Body Weight in 24 Hours (g/kg): 1.5 g/kg  Total Fluid Estimated Needs in 24 Hours (mL): 1250 mL  Method for Estimating 24 Hour Fluid Needs: 1 calorie/kg                       Nutrition Diagnosis   Malnutrition Diagnosis  Patient has Malnutrition Diagnosis: Yes  Diagnosis Status: Active  Malnutrition Diagnosis: Severe malnutrition related to chronic disease or condition  Related to: 9% weight loss over 1 month, poor oral intake, findings on NFPE (severe depletion fat and muscle stores)    Nutrition Diagnosis  Patient has Nutrition Diagnosis: Yes  Diagnosis Status (1): Active  Nutrition Diagnosis 1: Predicted inadequate energy intake  Related to (1): pathophsyiology of disease and treatment  As Evidenced by (1): anticipated nutrition impact symptoms affecting oral intake and weight  Additional Nutrition Diagnosis: Diagnosis 3  Diagnosis Status (2): Active  Nutrition Diagnosis 2: Increased energy expenditure  Related to (2): increased metabolic demand, disease process  As Evidenced by (2): cancer diagnosis, planned treatment  Diagnosis Status (3): Active  Nutrition Diagnosis 3: Disordered eating pattern  Related to (3): patients meal habits  As Evidenced by (3): pt not eating, caring for self, will not prepare food       Nutrition Interventions/Recommendations   Nutrition Prescription: Oral nutrition soft/puree/liquid diet, high calorie high protein, management of NIS    Nutrition Interventions:   Food and Nutrient Delivery: Meals & Snacks: Energy-modified diet, Fluid-modified diet, Modification of schedule of oral intake, Protein-modified diet, Texture-Modified Diet  Goals: Encouraged to consume 2 meals per day and emphasizing every bite counts, have snacks like ice cream or PNB when she can.  Avoid dry harsh  foods and acidic foods/beverages. Include adequate fluid intake. Continue to reinforce importance of  nutrition, oral diet, energy needs encouraging oral intake. Discussed when she gets feeding tube we still want her to eat / drink to maintain swallow function  Enteral Nutrition:  (Plan is for pt to get PEG.  when indicated recommend Isosource 1.5 (3.5) cartons per day to provide 1312 calories, 60 grams protein, 669 ml free water)  Medical Food Supplement: Commercial beverage medical food supplement therapy  Goals: Continue Boost VHC BID  Vitamin Supplement Therapy:  (recommend chewable MV)  Feeding Assistance Management: Mouth care management  Goals: as directed     Coordination of Care: Collaboration and referral of nutrition care:  (collaboration with other providers)  Goals: Counselor D re PEG placement, constipation. work with team to achieve best possible outcomes     DME: [employer]  Boost VHC BID  Provides 1060 calories and 44 grams protein  Comments: when pt has appointment scheduled for PEG will send new orders for enteral nutrition to [facility]  Pt already receives O2 from [facility]    Nutrition Education:   Nutrition Education Content: Content related nutrition education  [facility] PEG guide      3/31- Pt encouraged to continue to eat what she can and consume 2 Boost VHC. Discussed and explained PEG to her, answered questions       Nutrition Monitoring and Evaluation   Food and Nutrient Intake  Monitoring and Evaluation Plan: Energy intake, Meal/snack pattern, Protein intake, Fluid intake, Amount of food  Energy Intake: Estimated energy intake  Criteria: Meet >75% estimated energy needs- food recall  Fluid Intake: Estimated fluid intake  Criteria: maintain hydration  Amount of Food: Medical food intake  Criteria: ONS as recommeded  Meal/Snack Pattern: Estimated meal and snack pattern  Criteria: 2-4 small meals/snacks per day  Estimated protein intake: Estimated protein intake  Criteria: meet >75% estimated protein needs - food recall    Anthropometric measurements  Monitoring and Evaluation Plan: Weight  Body Weight:  Measured body weight  Criteria: maintain/gain weight         Nutrition Focused Physical Findings  Monitoring and Evaluation Plan: Adipose, Muscle  Adipose Finding: Loss of subcutaneous fat  Criteria: prevent further losses  Muscle Finding: Muscle atrophy  Criteria: prevent further losses         Follow Up: through treatment      Time Spent  Prep time on day of patient encounter: 5 minutes  Time spent directly with patient, family or caregiver: 20 minutes  Additional Time Spent on Patient Care Activities: 5 minutes  Documentation Time: 20 minutes  Other Time Spent: 0 minutes  Total: 50 minutes

## 2025-03-31 NOTE — PROGRESS NOTES
Subjective:   Patient Name: Ingris Mckinney    : 1955     MRN: [id number]     Age: 69 y.o.     Gender: female  Referring Provider: ARNAV    CC: Management of newly diagnosed L sided supraglottic squamous cell carcinoma ([id number])    Presenting History (3/13/2025): At time of initial presentation a 69 y.o. female, current smoker (started at age 19,1/3 pack per day) with a past medical history of osteoporosis, HLD, COPD (occasionally on O2) referred for management of suprglottic cancer.      She has been following with ENT for history of vocal cord paralysis without CT abnormality since , which was successfully managed with injection medialization.     She then developed acute worsening of her voice in 2024, with scope exam shortly after that showed mucosal irregularity along the anterior left false cord. The left true vocal cord was fixed in the paramedian position. The right true vocal cord exhibited normal movement. She was taken to the operating room for direct and biopsy on 2025. Operative findings showed a left false cord mass lesion which was not obstructive, and extended from anterior to posterior. The mass extended to the laryngeal ventricle on the left, but spared the true vocal cord. The mass did not appear to cross to the right. The vallecula, base of tongue and hypopharynx were normal. Biopsies from the left supraglottic mass were positive for invasive moderately differentiated squamous cell carcinoma.     CT neck on 2025 showed an enhancing mass in the left supraglottic larynx, 1.0 x 1.7 x 1.4 cm in size, which approached the epiglottis without definitive invasion. There was an area of irregularity/thickening on the right true vocal cord of unclear etiology.     2025: PET/CT: 1.  Focal intense hypermetabolic activity at the left aryepiglottic fold compatible with biopsy-proven squamous cell carcinoma. 2. No evidence of hypermetabolic kevin or distant metastatic disease.3.  Mild hypermetabolic activity associated with the right upper lobe pulmonary nodule. Continued attention on follow-up chest CT is recommended.    She was seen by ENT Dr. Lo and unfortunately was deemed not surgically resectable. She presented to [facility] for a consultation accompanied by her brother in law. She livers on her own. She states that she has been lazy over the past months and has lost a lot of weight. She doesn't really eat much food other than cereals. Luckily her sister and in-law brought her dinner. She has gained 12 lb in the past weeks. She denies any dysphagia or odynophagia. NO pain. NO numbness or tingling. No heating issues. No fever or chills. No n/v/d/c    Social Hx:   No etoh or illicit drugs.   2 sisters   No kids      Treatment Summary:  - March 18, 2025 C1D1 Cisplatin - weekly with concurrent RT     Current Treatment:  - March 18, 2025 C1D1 Cisplatin - weekly with concurrent RT   - March 25, 2025 C1D8 Cisplatin  - March 31, 2025 C1D15 Cisplatin     Interval History (3/31/2025):    Patient presents today for toxicity check and treatment readiness visit. She is tolerating Cisplatin very well. Her primary complaint today is constipation. She denies any fevers, chills or night sweats. Chronic cough - COPD. No chest pain. Chronic JONES interferes with ADLs. On home oxygen, has heard from medical supply company, to receive portable oxygen concentrator on Thursday. No nausea or vomiting. No diarrhea. No urinary symptoms. No rash. No neuropathy. Reports she is eating without difficulty. Still working on getting PEG tube scheduled.     Review of Systems:  A review of systems has been completed and are negative for complaints except what is stated in the HPI and/or past medical history      Medical History:   Past Medical History:   Diagnosis Date    Anxiety     At risk for falling     COPD (chronic obstructive pulmonary disease) (Multi)     Hypercholesteremia     Hypoxia     Larynx cancer (Multi)      Malnutrition (Multi)     Personal history of other diseases of the respiratory system     History of chronic obstructive lung disease    Respiratory failure (Multi)     Tachycardia     Weakness        Surgical History:   Past Surgical History:   Procedure Laterality Date    CT GUIDED IMAGING FOR NEEDLE PLACEMENT  05/05/2020    CT GUIDED IMAGING FOR NEEDLE PLACEMENT [facility]    LARYNGOSCOPY      direct w/ bx    OTHER SURGICAL HISTORY  11/11/2022    Hip surgery    OTHER SURGICAL HISTORY  11/11/2022    Arm surgery    OTHER SURGICAL HISTORY  11/11/2022    Leg surgery       Family History:  Family History   Problem Relation Name Age of Onset    Other (chronic lung disease) Other         Allergies:  Allergies   Allergen Reactions    Scallops Anaphylaxis    Iodinated Contrast Media Itching       Meds (Current):    Current Outpatient Medications:     albuterol 2.5 mg /3 mL (0.083 %) nebulizer solution, Take 3 mL (2.5 mg) by nebulization every 4 hours if needed for wheezing., Disp: , Rfl:     albuterol 90 mcg/actuation inhaler, Inhale 2 puffs every 4 hours if needed., Disp: , Rfl:     alendronate (Fosamax) 70 mg tablet, Take 1 tablet (70 mg) by mouth every 7 days. Take in the morning with a full glass of water, on an empty stomach, and do not take anything else by mouth or lie down for the next 30 min., Disp: 12 tablet, Rfl: 3    dexAMETHasone (Decadron) 4 mg tablet, Take 2 tablets (8 mg) by mouth once daily. For 3 days per week starting the day after treatment., Disp: 42 tablet, Rfl: 0    Dulera 200-5 mcg/actuation inhaler, Inhale 2 puffs twice a day., Disp: , Rfl:     OLANZapine (ZyPREXA) 5 mg tablet, Take 1 tablet (5 mg) by mouth once daily at bedtime., Disp: 30 tablet, Rfl: 1    ondansetron (Zofran) 8 mg tablet, Take 1 tablet (8 mg) by mouth every 8 hours if needed for nausea or vomiting., Disp: 30 tablet, Rfl: 5    prochlorperazine (Compazine) 10 mg tablet, Take 1 tablet (10 mg) by mouth every 6 hours if  needed for nausea or vomiting., Disp: 30 tablet, Rfl: 5    rosuvastatin (Crestor) 10 mg tablet, Take 1 tablet (10 mg) by mouth once daily., Disp: 90 tablet, Rfl: 2    umeclidinium (Incruse Ellipta) 62.5 mcg/actuation inhalation, Inhale 1 puff (62.5 mcg) once daily., Disp: , Rfl:   No current facility-administered medications for this visit.    Facility-Administered Medications Ordered in Other Visits:     albuterol 2.5 mg /3 mL (0.083 %) nebulizer solution 3 mL, 3 mL, nebulization, PRN, Kimmie Waters MD MPH    CISplatin (Platinol) 54 mg in sodium chloride 0.9% 601 mL IV, 40 mg/m2 (Treatment Plan Recorded), intravenous, Once, Kimmie Waters MD MPH    dextrose 5 % in water (D5W) bolus 500 mL, 500 mL, intravenous, PRN, Kimmie Waters MD MPH    diphenhydrAMINE (BENADryl) injection 50 mg, 50 mg, intravenous, PRN, Kimmie Waters MD MPH    EPINEPHrine (Epipen) injection syringe 0.3 mg, 0.3 mg, intramuscular, q5 min PRN, Kimmie Waters MD MPH    famotidine PF (Pepcid) injection 20 mg, 20 mg, intravenous, Once PRN, Kimmie Waters MD MPH    methylPREDNISolone sod succinate (SOLU-Medrol) 40 mg/mL injection 40 mg, 40 mg, intravenous, PRN, Kimmie Waters MD MPH    prochlorperazine (Compazine) injection 10 mg, 10 mg, intravenous, q6h PRN, Kimmie Waters MD MPH    prochlorperazine (Compazine) tablet 10 mg, 10 mg, oral, q6h PRN, Kimmie Waters MD MPH    sodium chloride 0.9 % bolus 1,000 mL, 1,000 mL, intravenous, Once, Kimmie Waters MD MPH    sodium chloride 0.9 % bolus 500 mL, 500 mL, intravenous, PRN, Kimmie Waters MD MPH    Pain Assessment:  Pain is: 0    Performance Status (ECOG): 2  ECOG Definition  0 Fully active; no performance restrictions.  1 Strenuous physical activity restricted; fully ambulatory and able to carry out light work.  2 Capable of all self-care but unable to carry out any work activities. Up and about >50% of waking hours.  3 Capable of only limited self-care; confined to bed or chair >50% of waking hours.  4 Completely disabled; cannot carry out  any self-care; totally confined to bed or chair.  Exam:    Vital Signs:  /64 (BP Location: Right arm, Patient Position: Sitting)   Pulse 99   Temp 35.9 °C (96.6 °F) (Temporal)   Resp 18   Wt (!) 43 kg (94 lb 12.8 oz)   SpO2 99%   BMI 17.44 kg/m²     Wt Readings from Last 5 Encounters:   25 (!) 43 kg (94 lb 12.8 oz)   25 (!) 43 kg (94 lb 12.8 oz)   25 46.1 kg (101 lb 10.1 oz)   25 (!) 42.8 kg (94 lb 7.5 oz)   25 (!) 42.8 kg (94 lb 7.5 oz)     Physical Exam:  ECO  Pain: 0  Constitutional: Well developed, awake/alert/oriented x3, no distress, alert and cooperative  Eyes: PER. sclera anicteric  ENMT: Oral mucosa moist, no lesions or thrush identified  Respiratory/Thorax: Breathing is non-labored. Lungs are clear to auscultation bilaterally. No adventitious breath sounds  Cardiovascular: S1-S2. Regular rate and rhythm. No murmurs, rubs, or gallops appreciated  Gastrointestinal: Abdomen soft nontender, nondistended, normal active bowel sounds.  Musculoskeletal: ROM intact, no joint swelling, normal strength  Extremities: normal extremities, no cyanosis, no edema, no clubbing  Lymphatics: no palpable lymphadenopathy   Skin: no rash  Neurologic: alert and oriented x3. Nonfocal exam. No myoclonus  Psychological: Pleasant, appropriate and easily engaged     Labs:  Lab Results   Component Value Date    WBC 8.1 2025    HGB 12.0 2025    HCT 36.8 2025    MCV 98 2025     2025      Lab Results   Component Value Date    NEUTROABS 6.43 2025      Lab Results   Component Value Date    GLUCOSE 111 (H) 2025    CALCIUM 9.2 2025     2025    K 4.0 2025    CO2 36 (H) 2025    CL 99 2025    BUN 16 2025    CREATININE 0.46 (L) 2025    MG 1.95 2025     Lab Results   Component Value Date    ALT 18 2025    AST 19 2025    ALKPHOS 37 2025    BILITOT 0.5 2025      Lab Results    Component Value Date    TSH 4.09 (H) 2025    FREET4 1.06 2025            Assessment/Plan:   69 y.o. female with past medical history as stated referred for management of supraglottic squamous cell carcinoma.    The patient's records and imaging have been reviewed in detail.  I have discussed with the patient the natural history of their disease at length.  The following has also been discussed with the patient:    # Cancer:   L sided supraglottic squamous cell carcinoma ([id number]). Given that she is not surgically resectable. I thus recommend treatment of weekly cisplatin with concurrent RT for 7 weeks. Side effects of chenotherapy including but not limited to nausea, vomiting, diarrhea, anemia, thrombocytopenia,  leukopenia, neutropenic fever, kidney toxicities, liver toxicities, rash, infusion related reaction, secondary malignancies MDS or AML, and fertility impact, neuropathy and hearing impact. Consent signed. To start next week.     - Interval Imagin2025: PET/CT: 1.  Focal intense hypermetabolic activity at the left aryepiglottic fold compatible with biopsy-proven squamous cell carcinoma. 2. No evidence of hypermetabolic kevin or distant metastatic disease.3. Mild hypermetabolic activity associated with the right upper lobe pulmonary nodule. Continued attention on follow-up chest CT is recommended.    # Pulmonary nodule: will continue to follow.    # Clinical Trials:  None currently.     # Access: Peripheral veins. Will likely require Mediport.     # Pain: No acute pain.    # Bone Health: No signs of bony disease.     # Psychosocial: Coping well, no acute issues.  Good support from family & friends.  Social work support offered.    # Hearing: NO baseline hearing issues.    # Speech and swallow: Order for PEG Tube placement   Has been sent to GI (will not put PEG in), General Surgery (consult appt ) and [facility] (no longer places PEGs)  Call out to IR at [facility] and  social work will assist in transportation.     # GI/CINV: severe malnutrition. Nutrition following.    # Smoking: N/A, current smoker, working on quitting.    # Fertility: N/A.  Oncofertility support available as needed.      # Heme/ESAs: N/A.    #JONES: Has home oxygen ([employer]). Portable concentrator ordered. Oxygen 93% RA at rest. 88% walking short distance with assistance. 97% at rest on 2L via NC.     # GOC: Patient is aware of curative intent goals of care.    # Language: English.    # Interdisciplinary Patient/Family Education Record:  Learner:  Patient.  Learning Needs Reviewed:  Treatments, Medications, Disease Process, Diagnostic Tests.  Barriers: None.  Teaching Method: Discussion, Handout(s).  Comprehension:  Verbalized Understanding.  Follow-up Plan:  Return to office as per MD.    # Dispo:   RTC in 1 week   Continue chemoRT - weekly Cisplatin.  Hydration weekly on Thursdays     Michael Fregoso, APRN-CNP  [facility]  Thoracic + H&N Medical Oncology     I spent a total of 35+ minutes on the date of the service which included preparing to see the patient, face-to-face patient care, completing clinical documentation, obtaining and / or reviewing separately obtained history, counseling and educating the patient/family/caregiver, ordering medications, tests, or procedures, communicating with other healthcare providers (not separately reported), independently interpreting results, not separately reported, and communicating results to the patient/family/caregiver, Name and date of birth verified.

## 2025-04-01 ENCOUNTER — APPOINTMENT (OUTPATIENT)
Dept: RADIATION ONCOLOGY | Facility: CLINIC | Age: 70
End: 2025-04-01
Payer: MEDICARE

## 2025-04-01 ENCOUNTER — HOSPITAL ENCOUNTER (OUTPATIENT)
Dept: RADIATION ONCOLOGY | Facility: CLINIC | Age: 70
Setting detail: RADIATION/ONCOLOGY SERIES
Discharge: HOME | End: 2025-04-01
Payer: MEDICARE

## 2025-04-01 DIAGNOSIS — C32.8 MALIGNANT NEOPLASM OF OVERLAPPING SITES OF LARYNX (MULTI): ICD-10-CM

## 2025-04-01 DIAGNOSIS — Z51.0 ENCOUNTER FOR ANTINEOPLASTIC RADIATION THERAPY: ICD-10-CM

## 2025-04-01 LAB
RAD ONC MSQ ACTUAL FRACTIONS DELIVERED: 11
RAD ONC MSQ ACTUAL SESSION DELIVERED DOSE: 200 CGRAY
RAD ONC MSQ ACTUAL TOTAL DOSE: 2200 CGRAY
RAD ONC MSQ ELAPSED DAYS: 14
RAD ONC MSQ LAST DATE: NORMAL
RAD ONC MSQ PRESCRIBED FRACTIONAL DOSE: 200 CGRAY
RAD ONC MSQ PRESCRIBED NUMBER OF FRACTIONS: 35
RAD ONC MSQ PRESCRIBED TECHNIQUE: NORMAL
RAD ONC MSQ PRESCRIBED TOTAL DOSE: 7000 CGRAY
RAD ONC MSQ PRESCRIPTION PATTERN COMMENT: NORMAL
RAD ONC MSQ START DATE: NORMAL
RAD ONC MSQ TREATMENT COURSE NUMBER: 1
RAD ONC MSQ TREATMENT SITE: NORMAL

## 2025-04-01 PROCEDURE — 77336 RADIATION PHYSICS CONSULT: CPT | Performed by: STUDENT IN AN ORGANIZED HEALTH CARE EDUCATION/TRAINING PROGRAM

## 2025-04-01 PROCEDURE — 77386 HC INTENSITY-MODULATED RADIATION THERAPY (IMRT), COMPLEX: CPT | Performed by: STUDENT IN AN ORGANIZED HEALTH CARE EDUCATION/TRAINING PROGRAM

## 2025-04-02 ENCOUNTER — HOSPITAL ENCOUNTER (OUTPATIENT)
Dept: RADIATION ONCOLOGY | Facility: CLINIC | Age: 70
Setting detail: RADIATION/ONCOLOGY SERIES
Discharge: HOME | End: 2025-04-02
Payer: MEDICARE

## 2025-04-02 ENCOUNTER — TELEPHONE (OUTPATIENT)
Dept: HEMATOLOGY/ONCOLOGY | Facility: CLINIC | Age: 70
End: 2025-04-02
Payer: MEDICARE

## 2025-04-02 ENCOUNTER — APPOINTMENT (OUTPATIENT)
Dept: RADIATION ONCOLOGY | Facility: CLINIC | Age: 70
End: 2025-04-02
Payer: MEDICARE

## 2025-04-02 DIAGNOSIS — Z91.041 HISTORY OF ALLERGY TO RADIOGRAPHIC CONTRAST MEDIA: Primary | ICD-10-CM

## 2025-04-02 DIAGNOSIS — C32.8 MALIGNANT NEOPLASM OF OVERLAPPING SITES OF LARYNX (MULTI): ICD-10-CM

## 2025-04-02 DIAGNOSIS — Z51.0 ENCOUNTER FOR ANTINEOPLASTIC RADIATION THERAPY: ICD-10-CM

## 2025-04-02 LAB
RAD ONC MSQ ACTUAL FRACTIONS DELIVERED: 12
RAD ONC MSQ ACTUAL SESSION DELIVERED DOSE: 200 CGRAY
RAD ONC MSQ ACTUAL TOTAL DOSE: 2400 CGRAY
RAD ONC MSQ ELAPSED DAYS: 15
RAD ONC MSQ LAST DATE: NORMAL
RAD ONC MSQ PRESCRIBED FRACTIONAL DOSE: 200 CGRAY
RAD ONC MSQ PRESCRIBED NUMBER OF FRACTIONS: 35
RAD ONC MSQ PRESCRIBED TECHNIQUE: NORMAL
RAD ONC MSQ PRESCRIBED TOTAL DOSE: 7000 CGRAY
RAD ONC MSQ PRESCRIPTION PATTERN COMMENT: NORMAL
RAD ONC MSQ START DATE: NORMAL
RAD ONC MSQ TREATMENT COURSE NUMBER: 1
RAD ONC MSQ TREATMENT SITE: NORMAL

## 2025-04-02 PROCEDURE — 77386 HC INTENSITY-MODULATED RADIATION THERAPY (IMRT), COMPLEX: CPT | Performed by: STUDENT IN AN ORGANIZED HEALTH CARE EDUCATION/TRAINING PROGRAM

## 2025-04-02 RX ORDER — PREDNISONE 50 MG/1
50 TABLET ORAL DAILY
Qty: 3 TABLET | Refills: 0 | Status: SHIPPED | OUTPATIENT
Start: 2025-04-02 | End: 2025-04-05

## 2025-04-02 NOTE — PROGRESS NOTES
Patient has allergy to iodine contrast. Script for prednisone sent to pharmacy on file as premedication. Patient will receive pepcid and benadryl upon arrival for procedure. Nursing called and instructed patient on use prior to procedure.

## 2025-04-02 NOTE — TELEPHONE ENCOUNTER
I spoke to Ingris.  Per Michael Fregoso CNP she wanted me to relay to Ingris that she definitely needs the feeding tube as she has chemo?radiation scheduled through the beginning of May.  Ingris is worried about the procedure but is in agreement to have it done.  I reviewed the location and time with her.  She will call her sister to reinforce her need for a ride.

## 2025-04-03 ENCOUNTER — HOSPITAL ENCOUNTER (OUTPATIENT)
Dept: CARDIOLOGY | Facility: HOSPITAL | Age: 70
Discharge: HOME | End: 2025-04-03
Payer: MEDICARE

## 2025-04-03 ENCOUNTER — APPOINTMENT (OUTPATIENT)
Dept: RADIATION ONCOLOGY | Facility: CLINIC | Age: 70
End: 2025-04-03
Payer: MEDICARE

## 2025-04-03 ENCOUNTER — HOSPITAL ENCOUNTER (OUTPATIENT)
Dept: RADIOLOGY | Facility: HOSPITAL | Age: 70
Discharge: HOME | End: 2025-04-03
Payer: MEDICARE

## 2025-04-03 ENCOUNTER — APPOINTMENT (OUTPATIENT)
Dept: HEMATOLOGY/ONCOLOGY | Facility: CLINIC | Age: 70
End: 2025-04-03
Payer: MEDICARE

## 2025-04-03 VITALS
HEART RATE: 89 BPM | RESPIRATION RATE: 16 BRPM | DIASTOLIC BLOOD PRESSURE: 76 MMHG | SYSTOLIC BLOOD PRESSURE: 158 MMHG | TEMPERATURE: 98.1 F | OXYGEN SATURATION: 92 %

## 2025-04-03 DIAGNOSIS — E43 SEVERE PROTEIN-CALORIE MALNUTRITION (MULTI): ICD-10-CM

## 2025-04-03 DIAGNOSIS — Z09 S/P GASTROSTOMY TUBE (G TUBE) PLACEMENT, FOLLOW-UP EXAM: Primary | ICD-10-CM

## 2025-04-03 DIAGNOSIS — C32.1 PRIMARY SQUAMOUS CELL CARCINOMA OF SUPRAGLOTTIS: ICD-10-CM

## 2025-04-03 PROCEDURE — 2500000001 HC RX 250 WO HCPCS SELF ADMINISTERED DRUGS (ALT 637 FOR MEDICARE OP): Performed by: NURSE PRACTITIONER

## 2025-04-03 PROCEDURE — 99153 MOD SED SAME PHYS/QHP EA: CPT | Performed by: STUDENT IN AN ORGANIZED HEALTH CARE EDUCATION/TRAINING PROGRAM

## 2025-04-03 PROCEDURE — 49440 PLACE GASTROSTOMY TUBE PERC: CPT | Performed by: STUDENT IN AN ORGANIZED HEALTH CARE EDUCATION/TRAINING PROGRAM

## 2025-04-03 PROCEDURE — 2550000001 HC RX 255 CONTRASTS: Performed by: STUDENT IN AN ORGANIZED HEALTH CARE EDUCATION/TRAINING PROGRAM

## 2025-04-03 PROCEDURE — 7100000009 HC PHASE TWO TIME - INITIAL BASE CHARGE: Performed by: STUDENT IN AN ORGANIZED HEALTH CARE EDUCATION/TRAINING PROGRAM

## 2025-04-03 PROCEDURE — 2720000007 HC OR 272 NO HCPCS: Performed by: STUDENT IN AN ORGANIZED HEALTH CARE EDUCATION/TRAINING PROGRAM

## 2025-04-03 PROCEDURE — 2500000005 HC RX 250 GENERAL PHARMACY W/O HCPCS: Performed by: STUDENT IN AN ORGANIZED HEALTH CARE EDUCATION/TRAINING PROGRAM

## 2025-04-03 PROCEDURE — 7100000010 HC PHASE TWO TIME - EACH INCREMENTAL 1 MINUTE: Performed by: STUDENT IN AN ORGANIZED HEALTH CARE EDUCATION/TRAINING PROGRAM

## 2025-04-03 PROCEDURE — 2500000004 HC RX 250 GENERAL PHARMACY W/ HCPCS (ALT 636 FOR OP/ED): Mod: JZ | Performed by: STUDENT IN AN ORGANIZED HEALTH CARE EDUCATION/TRAINING PROGRAM

## 2025-04-03 PROCEDURE — 76942 ECHO GUIDE FOR BIOPSY: CPT

## 2025-04-03 PROCEDURE — C1769 GUIDE WIRE: HCPCS | Performed by: STUDENT IN AN ORGANIZED HEALTH CARE EDUCATION/TRAINING PROGRAM

## 2025-04-03 PROCEDURE — 99152 MOD SED SAME PHYS/QHP 5/>YRS: CPT | Performed by: STUDENT IN AN ORGANIZED HEALTH CARE EDUCATION/TRAINING PROGRAM

## 2025-04-03 PROCEDURE — 2500000002 HC RX 250 W HCPCS SELF ADMINISTERED DRUGS (ALT 637 FOR MEDICARE OP, ALT 636 FOR OP/ED): Performed by: NURSE PRACTITIONER

## 2025-04-03 RX ORDER — LIDOCAINE HYDROCHLORIDE 10 MG/ML
INJECTION, SOLUTION EPIDURAL; INFILTRATION; INTRACAUDAL; PERINEURAL AS NEEDED
Status: DISCONTINUED | OUTPATIENT
Start: 2025-04-03 | End: 2025-04-03 | Stop reason: HOSPADM

## 2025-04-03 RX ORDER — FENTANYL CITRATE 50 UG/ML
INJECTION, SOLUTION INTRAMUSCULAR; INTRAVENOUS AS NEEDED
Status: DISCONTINUED | OUTPATIENT
Start: 2025-04-03 | End: 2025-04-03 | Stop reason: HOSPADM

## 2025-04-03 RX ORDER — ACETAMINOPHEN 160 MG/5ML
650 SOLUTION ORAL EVERY 4 HOURS PRN
Status: DISCONTINUED | OUTPATIENT
Start: 2025-04-03 | End: 2025-04-04 | Stop reason: HOSPADM

## 2025-04-03 RX ORDER — FENTANYL CITRATE 50 UG/ML
50 INJECTION, SOLUTION INTRAMUSCULAR; INTRAVENOUS ONCE
Status: DISCONTINUED | OUTPATIENT
Start: 2025-04-03 | End: 2025-04-04 | Stop reason: HOSPADM

## 2025-04-03 RX ORDER — CEFAZOLIN SODIUM 1 G/50ML
SOLUTION INTRAVENOUS CONTINUOUS PRN
Status: COMPLETED | OUTPATIENT
Start: 2025-04-03 | End: 2025-04-03

## 2025-04-03 RX ORDER — SODIUM CHLORIDE 9 MG/ML
INJECTION, SOLUTION INTRAVENOUS CONTINUOUS PRN
Status: COMPLETED | OUTPATIENT
Start: 2025-04-03 | End: 2025-04-03

## 2025-04-03 RX ORDER — FAMOTIDINE 20 MG/1
20 TABLET, FILM COATED ORAL ONCE
Status: COMPLETED | OUTPATIENT
Start: 2025-04-03 | End: 2025-04-03

## 2025-04-03 RX ORDER — DIPHENHYDRAMINE HCL 25 MG
50 CAPSULE ORAL ONCE
Status: COMPLETED | OUTPATIENT
Start: 2025-04-03 | End: 2025-04-03

## 2025-04-03 RX ORDER — ALBUTEROL SULFATE 0.83 MG/ML
2.5 SOLUTION RESPIRATORY (INHALATION) EVERY 6 HOURS PRN
Status: DISCONTINUED | OUTPATIENT
Start: 2025-04-03 | End: 2025-04-04 | Stop reason: HOSPADM

## 2025-04-03 RX ORDER — ACETAMINOPHEN 325 MG/1
650 TABLET ORAL EVERY 4 HOURS PRN
Status: DISCONTINUED | OUTPATIENT
Start: 2025-04-03 | End: 2025-04-04 | Stop reason: HOSPADM

## 2025-04-03 RX ORDER — MIDAZOLAM HYDROCHLORIDE 1 MG/ML
INJECTION INTRAMUSCULAR; INTRAVENOUS AS NEEDED
Status: DISCONTINUED | OUTPATIENT
Start: 2025-04-03 | End: 2025-04-03 | Stop reason: HOSPADM

## 2025-04-03 RX ADMIN — MIDAZOLAM HYDROCHLORIDE 1 MG: 1 INJECTION, SOLUTION INTRAMUSCULAR; INTRAVENOUS at 11:43

## 2025-04-03 RX ADMIN — LIDOCAINE HYDROCHLORIDE 10 ML: 10 INJECTION, SOLUTION EPIDURAL; INFILTRATION; INTRACAUDAL; PERINEURAL at 11:31

## 2025-04-03 RX ADMIN — IOHEXOL 15 ML: 350 INJECTION, SOLUTION INTRAVENOUS at 11:48

## 2025-04-03 RX ADMIN — IOHEXOL 10 ML: 350 INJECTION, SOLUTION INTRAVENOUS at 11:33

## 2025-04-03 RX ADMIN — ACETAMINOPHEN 650 MG: 650 SOLUTION ORAL at 13:23

## 2025-04-03 RX ADMIN — FENTANYL CITRATE 50 MCG: 50 INJECTION, SOLUTION INTRAMUSCULAR; INTRAVENOUS at 11:15

## 2025-04-03 RX ADMIN — Medication 2 L/MIN: at 10:55

## 2025-04-03 RX ADMIN — CEFAZOLIN SODIUM 1 G: 1 SOLUTION INTRAVENOUS at 11:18

## 2025-04-03 RX ADMIN — DIPHENHYDRAMINE HYDROCHLORIDE 50 MG: 25 CAPSULE ORAL at 09:05

## 2025-04-03 RX ADMIN — FENTANYL CITRATE 50 MCG: 50 INJECTION, SOLUTION INTRAMUSCULAR; INTRAVENOUS at 11:43

## 2025-04-03 RX ADMIN — FAMOTIDINE 20 MG: 20 TABLET, FILM COATED ORAL at 09:06

## 2025-04-03 RX ADMIN — GLUCAGON 1 MG: KIT at 11:25

## 2025-04-03 RX ADMIN — ALBUTEROL SULFATE 2.5 MG: 2.5 SOLUTION RESPIRATORY (INHALATION) at 09:54

## 2025-04-03 RX ADMIN — MIDAZOLAM HYDROCHLORIDE 1 MG: 1 INJECTION, SOLUTION INTRAMUSCULAR; INTRAVENOUS at 11:15

## 2025-04-03 RX ADMIN — IOHEXOL 15 ML: 350 INJECTION, SOLUTION INTRAVENOUS at 11:37

## 2025-04-03 RX ADMIN — SODIUM CHLORIDE 30 ML/HR: 9 INJECTION, SOLUTION INTRAVENOUS at 11:02

## 2025-04-03 ASSESSMENT — PAIN - FUNCTIONAL ASSESSMENT
PAIN_FUNCTIONAL_ASSESSMENT: 0-10

## 2025-04-03 ASSESSMENT — ENCOUNTER SYMPTOMS
MUSCULOSKELETAL NEGATIVE: 1
ALLERGIC/IMMUNOLOGIC NEGATIVE: 1
RESPIRATORY NEGATIVE: 1
PSYCHIATRIC NEGATIVE: 1
NEUROLOGICAL NEGATIVE: 1
CONSTITUTIONAL NEGATIVE: 1
EYES NEGATIVE: 1
ENDOCRINE NEGATIVE: 1
GASTROINTESTINAL NEGATIVE: 1
HEMATOLOGIC/LYMPHATIC NEGATIVE: 1
CARDIOVASCULAR NEGATIVE: 1

## 2025-04-03 ASSESSMENT — PAIN DESCRIPTION - DESCRIPTORS
DESCRIPTORS: TENDER;SORE

## 2025-04-03 ASSESSMENT — PAIN SCALES - GENERAL
PAINLEVEL_OUTOF10: 2
PAINLEVEL_OUTOF10: 4
PAINLEVEL_OUTOF10: 4
PAINLEVEL_OUTOF10: 3
PAINLEVEL_OUTOF10: 3

## 2025-04-03 NOTE — NURSING NOTE
Contacted [facility] [phone] to notify them that patient is still at [facility] after G Tube placement, and her bedrest isn't over until 3PM. Patient has a treatment at 4PM but is deciding to skip treatment today and go to her appointment tomorrow at 12:30PM.

## 2025-04-03 NOTE — H&P
History Of Present Illness  Ingris Mckinney is a 69 y.o. female presenting with left sided supraglottic squamous cell carcinoma, malnutrition, here for G tube placement. PMH includes left sided supraglottic squamous cell carcinoma, COPD, former tobacco smoker, HLD, HTN, severe protein-calorie malnutrition, anxiety, depression.     Past Medical History:  Past Medical History:   Diagnosis Date    Anxiety     At risk for falling     COPD (chronic obstructive pulmonary disease) (Multi)     Hypercholesteremia     Hypoxia     Larynx cancer (Multi)     Malnutrition (Multi)     Personal history of other diseases of the respiratory system     History of chronic obstructive lung disease    Respiratory failure (Multi)     Tachycardia     Weakness         Past Surgical History:  Past Surgical History:   Procedure Laterality Date    CT GUIDED IMAGING FOR NEEDLE PLACEMENT  05/05/2020    CT GUIDED IMAGING FOR NEEDLE PLACEMENT [facility]    LARYNGOSCOPY      direct w/ bx    OTHER SURGICAL HISTORY  11/11/2022    Hip surgery    OTHER SURGICAL HISTORY  11/11/2022    Arm surgery    OTHER SURGICAL HISTORY  11/11/2022    Leg surgery          Social History:  Social History     Tobacco Use    Smoking status: Some Days     Current packs/day: 0.25     Average packs/day: 1 pack/day for 50.0 years (49.7 ttl pk-yrs)     Types: Cigarettes     Start date: 12/30/1974     Last attempt to quit: 8/1/2024     Passive exposure: Current    Smokeless tobacco: Never   Vaping Use    Vaping status: Never Used   Substance Use Topics    Alcohol use: Never    Drug use: Never       Family History:  Family History   Problem Relation Name Age of Onset    Other (chronic lung disease) Other          Allergies:  Allergies   Allergen Reactions    Iodinated Contrast Media Itching    Scallops Anaphylaxis        Home Medications:  Current Outpatient Medications   Medication Instructions    albuterol 90 mcg/actuation inhaler 2 puffs, Every 4 hours PRN     albuterol 2.5 mg, Every 4 hours PRN    alendronate (FOSAMAX) 70 mg, oral, Every 7 days, Take in the morning with a full glass of water, on an empty stomach, and do not take anything else by mouth or lie down for the next 30 min.    dexAMETHasone (DECADRON) 8 mg, oral, Daily, For 3 days per week starting the day after treatment.    Dulera 200-5 mcg/actuation inhaler 2 puffs, 2 times daily    OLANZapine (ZYPREXA) 5 mg, oral, Nightly    ondansetron (ZOFRAN) 8 mg, oral, Every 8 hours PRN    predniSONE (DELTASONE) 50 mg, oral, Daily, Administer 13 hours (at 9pm on 4/2/25), 7 hours (at 3am on 4/3), and 1 hour (at 9am on 4/3) prior to procedure    prochlorperazine (COMPAZINE) 10 mg, oral, Every 6 hours PRN    rosuvastatin (CRESTOR) 10 mg, oral, Daily    umeclidinium (Incruse Ellipta) 62.5 mcg/actuation inhalation 1 puff, Daily RT       Inpatient Medications:  Scheduled medications   Medication Dose Route Frequency    predniSONE  50 mg oral Once     PRN medications   Medication    albuterol     Continuous Medications   Medication Dose Last Rate         Review of Systems   Constitutional: Negative.    HENT: Negative.     Eyes: Negative.    Respiratory: Negative.     Cardiovascular: Negative.    Gastrointestinal: Negative.    Endocrine: Negative.    Genitourinary: Negative.    Musculoskeletal: Negative.    Skin: Negative.    Allergic/Immunologic: Negative.    Neurological: Negative.    Hematological: Negative.    Psychiatric/Behavioral: Negative.            Physical Exam  Constitutional:       General: She is awake. She is not in acute distress.     Appearance: She is not ill-appearing.   HENT:      Mouth/Throat:      Comments: Vocal hoarseness noted  Cardiovascular:      Rate and Rhythm: Normal rate and regular rhythm.      Pulses: Normal pulses.           Radial pulses are 2+ on the right side and 2+ on the left side.        Dorsalis pedis pulses are 2+ on the right side and 2+ on the left side.      Heart sounds: Normal  heart sounds. No murmur heard.  Pulmonary:      Effort: Pulmonary effort is normal.      Breath sounds: Normal air entry. Wheezing present.   Abdominal:      General: Bowel sounds are normal.      Palpations: Abdomen is soft.      Tenderness: There is no abdominal tenderness.   Musculoskeletal:      Right lower leg: No edema.      Left lower leg: No edema.   Skin:     General: Skin is warm and dry.   Neurological:      General: No focal deficit present.      Mental Status: She is alert and oriented to person, place, and time.      GCS: GCS eye subscore is 4. GCS verbal subscore is 5. GCS motor subscore is 6.   Psychiatric:         Mood and Affect: Mood normal.         Behavior: Behavior is cooperative.        Sedation Plan    ASA 3     Mallampati class: III.           NPO since last night around 2045    Last Recorded Vitals  Blood pressure 174/87, pulse 96, temperature 36.7 °C (98.1 °F), temperature source Temporal, resp. rate 16, SpO2 95%.         Vitals from the Past 24 Hours  Heart Rate:  [96]   Temp:  [36.7 °C (98.1 °F)]   Resp:  [16]   BP: (174)/(87)   SpO2:  [95 %]          Relevant Results    Labs        CBC:   Recent Labs     03/31/25  0733 03/24/25  0749 03/17/25  0814 12/30/24  1648 01/10/23  0900 08/04/20  1029   WBC 8.1 6.3 5.6 10.0 8.7 8.6   HGB 12.0 12.6 12.3 13.3 14.0 14.8   HCT 36.8 39.6 38.2 41.4 43.7 46.5*    166 168 242 221 233   MCV 98 100 99 94 102.1* 100.9*     BMP/CMP:   Recent Labs     03/31/25  0733 03/24/25  0749 03/17/25  0814 12/30/24  1648 01/10/23  0900 08/04/20  1029 07/17/20  0044 07/16/20  1602 12/01/19  0600 11/30/19  1224 11/30/19  0605 11/29/19  1225    140 140 140 141 139   < > 138   < >  --    < > 134   K 4.0 4.4 3.9 3.8 4.9 4.0   < > 4.0   < >  --    < > 4.3   CL 99 97* 103 102 102 99   < > 98   < >  --    < > 97   BUN 16 28* 25* 9 8 10   < > 13   < >  --    < > 13   CREATININE 0.46* 0.51 0.42* 0.44* 0.5 0.7   < > 0.6   < >  --    < > 0.4   CO2 36* 36* 28 30 25 24   " < > 27   < >  --    < > 21*   CALCIUM 9.2 9.8 9.2 8.8 10.5* 9.8   < > 9.5   < >  --    < > 9.3   PROT 6.4 6.9 6.8  --   --  7.5  --  7.1  --   --   --  7.1   BILITOT 0.5 0.3 0.3  --   --  0.6  --  0.3  --  0.2  --  0.2   ALKPHOS 37 40 42  --   --  46  --  47  --   --   --  54   ALT 18 24 24  --   --  24  --  15  --   --   --  10   AST 19 23 26  --   --  22  --  17  --   --   --  20   GLUCOSE 111* 105* 121* 111* 128* 94   < > 102*   < >  --    < > 113*    < > = values in this interval not displayed.      Magnesium:   Recent Labs     03/31/25  0733 03/24/25  0749 03/17/25  0814 11/30/19  0605   MG 1.95 1.96 1.83 2.2     Lipid Panel:   Recent Labs     10/03/24  1106   CHOL 272*   HDL 68.8   CHHDL 4.0   VLDL 31   TRIG 156*   NHDL 203*     Cardiac       No lab exists for component: \"CK\", \"CKMBP\"   Hemoglobin A1C: No results for input(s): \"HGBA1C\" in the last 97951 hours.  TSH/ Free T4:   Recent Labs     03/17/25  0814 08/24/20  1603   TSH 4.09* 2.33   FREET4 1.06  --      Iron: No results for input(s): \"FERRITIN\", \"TIBC\", \"IRONSAT\", \"BNP\" in the last 21505 hours.  Coag:     ABO: No results found for: \"ABO\"    Past Cardiology Tests (Last 3 Years):    EKG:  No results for input(s): \"ATRRATE\", \"VENTRATE\", \"PRINT\", \"QRSDUR\", \"QTCFRED\", \"QTCCALCB\" in the last 03484 hours.No results found for this or any previous visit (from the past 4464 hours).  Echo:  Echocardiogram:   Echocardiogram     Study Date:       10/8/2022      PHYSICIAN INTERPRETATION:  Left Ventricle: Left ventricular systolic function is normal, with an  estimated ejection fraction of 55%. There are no regional wall motion  abnormalities. The left ventricular cavity size is normal. There is mild to  moderate concentric left ventricular hypertrophy. Spectral Doppler shows an  impaired relaxation pattern of left ventricular diastolic filling.  Left Atrium: The left atrium is upper limits of normal in size.  Right Ventricle: The right ventricle is upper limits of " "normal in size.  There is normal right ventricular global systolic function.  Right Atrium: The right atrium is normal in size.  Aortic Valve: The aortic valve was not well visualized. There is moderate  aortic valve cusp calcification. There is moderate aortic valve thickening.  There is evidence of mild to moderate aortic valve stenosis.  There is trivial aortic valve regurgitation. The peak instantaneous gradient  of the aortic valve is 15.7 mmHg. The mean gradient of the aortic valve is  9.0 mmHg.  Mitral Valve: The mitral valve is normal in structure. There is trace mitral  valve regurgitation.  Tricuspid Valve: The tricuspid valve was not well visualized. There is trace  tricuspid regurgitation.  Pulmonic Valve: The pulmonic valve is structurally normal. There is trace  pulmonic valve regurgitation.  Pericardium: There is no pericardial effusion noted.  Aorta: The aortic root was not well visualized. There is plaque visualized  in the ascending aorta, which is classified as a Grade 2 [mild (focal or  diffuse) intimal thickening of 2-3 mm] atherosclerosis.      CONCLUSIONS:  1. Left ventricular systolic function is normal with a 55% estimated  ejection fraction.  2. Spectral Doppler shows an impaired relaxation pattern of left ventricular  diastolic filling.  3. Trace tricuspid regurgitation is visualized.  4. Mild to moderate aortic valve stenosis.  5. There is moderate aortic valve cusp calcification.  6. There is plaque visualized in the ascending aorta.      Ejection Fractions:  No results found for: \"EF\"  Cath:  Coronary Angiography: No results found for this or any previous visit from the past 1800 days.    Right Heart Cath: No results found for this or any previous visit from the past 1800 days.    Stress Test:  Nuclear:No results found for this or any previous visit from the past 1800 days.    Metabolic Stress: No results found for this or any previous visit from the past 1800 days.    Cardiac " Imaging:  Cardiac Scoring: No results found for this or any previous visit from the past 1800 days.    Cardiac MRI: No results found for this or any previous visit from the past 1800 days.         Assessment/Plan  Assessment/Plan   Assessment & Plan  Primary squamous cell carcinoma of supraglottis (Multi)    Severe protein-calorie malnutrition (Multi)    left sided supraglottic squamous cell carcinoma, malnutrition,  -G tube placement with Dr. Avendaño on 4/3/25    IV iodine contrast allergy  -premedicated with prednisone PTA  -prednisone, benadryl, pepcid given 1 hour prior to procedure       NP discussed with Dr. Avendaño regarding plan of care/ discharge plan      I spent 30 minutes in the professional and overall care of this patient.      Migue Kincaid, APRN-CNP, DNP

## 2025-04-03 NOTE — POST-PROCEDURE NOTE
Interventional Radiology Brief Postprocedure Note    Attending: Kedar Avendaño    Diagnosis: Malnutrition    Description of procedure: Percutaneous 16F gastrostomy tube placement     Anesthesia:  Local and MAC Moderate    Complications: None    Estimated Blood Loss: minimal    Medications (Filter: Administrations occurring from 1643 to 1643 on 04/03/25) As of 04/03/25 1643      None          No specimens collected      See detailed result report with images in PACS.    The patient tolerated the procedure well without incident or complication and is in stable condition.

## 2025-04-03 NOTE — NURSING NOTE
Home going instructions specific to procedure given. Easily arousable; responding appropriately. Vs +/- 20% of pre procedure status. Significant complications are absent. Ambulates without dizziness/age appropriate activity, pulse ox above or equal to 92% on room air/ordered oxygen treatment. Care Plan Complete. Discharge to home accompanied by (family). Discharged via wheelchair. PIV removed and dressing C/D/I. G Tube dressing C/D/I.

## 2025-04-03 NOTE — DISCHARGE INSTRUCTIONS
PLEASE DO NOT USE G-TUBE FOR 24 HOURS    PLEASE WATCH FOR PERITONITIS SYMPTOMS AND GO TO EMERGENCY DEPARTMENT IF YOU DEVELOP ANY OF THE FOLLOWING SYMPTOMS:   Abdomen Pain/ bloating  Fever  Vomiting  Unable to urinate or defecate     Please call [phone] for any questions/concerns

## 2025-04-04 ENCOUNTER — LAB (OUTPATIENT)
Dept: LAB | Facility: CLINIC | Age: 70
End: 2025-04-04
Payer: MEDICARE

## 2025-04-04 ENCOUNTER — NUTRITION (OUTPATIENT)
Dept: HEMATOLOGY/ONCOLOGY | Facility: CLINIC | Age: 70
End: 2025-04-04

## 2025-04-04 ENCOUNTER — HOSPITAL ENCOUNTER (OUTPATIENT)
Dept: RADIATION ONCOLOGY | Facility: CLINIC | Age: 70
Setting detail: RADIATION/ONCOLOGY SERIES
Discharge: HOME | End: 2025-04-04
Payer: MEDICARE

## 2025-04-04 ENCOUNTER — APPOINTMENT (OUTPATIENT)
Dept: RADIATION ONCOLOGY | Facility: CLINIC | Age: 70
End: 2025-04-04
Payer: MEDICARE

## 2025-04-04 ENCOUNTER — NURSE TRIAGE (OUTPATIENT)
Dept: ADMISSION | Facility: HOSPITAL | Age: 70
End: 2025-04-04

## 2025-04-04 ENCOUNTER — TELEPHONE (OUTPATIENT)
Dept: PALLIATIVE MEDICINE | Facility: CLINIC | Age: 70
End: 2025-04-04

## 2025-04-04 VITALS
SYSTOLIC BLOOD PRESSURE: 164 MMHG | OXYGEN SATURATION: 97 % | RESPIRATION RATE: 18 BRPM | DIASTOLIC BLOOD PRESSURE: 89 MMHG | BODY MASS INDEX: 18.18 KG/M2 | WEIGHT: 98.77 LBS | HEART RATE: 75 BPM | TEMPERATURE: 97 F

## 2025-04-04 DIAGNOSIS — C32.1 PRIMARY SQUAMOUS CELL CARCINOMA OF SUPRAGLOTTIS: ICD-10-CM

## 2025-04-04 DIAGNOSIS — C32.8 MALIGNANT NEOPLASM OF OVERLAPPING SITES OF LARYNX (MULTI): ICD-10-CM

## 2025-04-04 DIAGNOSIS — Z51.0 ENCOUNTER FOR ANTINEOPLASTIC RADIATION THERAPY: ICD-10-CM

## 2025-04-04 LAB
ALBUMIN SERPL BCP-MCNC: 3.7 G/DL (ref 3.4–5)
ALP SERPL-CCNC: 29 U/L (ref 33–136)
ALT SERPL W P-5'-P-CCNC: 18 U/L (ref 7–45)
ANION GAP SERPL CALC-SCNC: 8 MMOL/L (ref 10–20)
AST SERPL W P-5'-P-CCNC: 15 U/L (ref 9–39)
BASOPHILS # BLD AUTO: 0.01 X10*3/UL (ref 0–0.1)
BASOPHILS NFR BLD AUTO: 0.1 %
BILIRUB SERPL-MCNC: 0.4 MG/DL (ref 0–1.2)
BUN SERPL-MCNC: 17 MG/DL (ref 6–23)
CALCIUM SERPL-MCNC: 8.8 MG/DL (ref 8.6–10.3)
CHLORIDE SERPL-SCNC: 99 MMOL/L (ref 98–107)
CO2 SERPL-SCNC: 35 MMOL/L (ref 21–32)
CREAT SERPL-MCNC: 0.46 MG/DL (ref 0.5–1.05)
EGFRCR SERPLBLD CKD-EPI 2021: >90 ML/MIN/1.73M*2
EOSINOPHIL # BLD AUTO: 0.01 X10*3/UL (ref 0–0.7)
EOSINOPHIL NFR BLD AUTO: 0.1 %
ERYTHROCYTE [DISTWIDTH] IN BLOOD BY AUTOMATED COUNT: 13.1 % (ref 11.5–14.5)
GLUCOSE SERPL-MCNC: 77 MG/DL (ref 74–99)
HCT VFR BLD AUTO: 35.8 % (ref 36–46)
HGB BLD-MCNC: 11.7 G/DL (ref 12–16)
IMM GRANULOCYTES # BLD AUTO: 0.04 X10*3/UL (ref 0–0.7)
IMM GRANULOCYTES NFR BLD AUTO: 0.5 % (ref 0–0.9)
LYMPHOCYTES # BLD AUTO: 1.04 X10*3/UL (ref 1.2–4.8)
LYMPHOCYTES NFR BLD AUTO: 13.3 %
MAGNESIUM SERPL-MCNC: 2.02 MG/DL (ref 1.6–2.4)
MCH RBC QN AUTO: 31.8 PG (ref 26–34)
MCHC RBC AUTO-ENTMCNC: 32.7 G/DL (ref 32–36)
MCV RBC AUTO: 97 FL (ref 80–100)
MONOCYTES # BLD AUTO: 0.5 X10*3/UL (ref 0.1–1)
MONOCYTES NFR BLD AUTO: 6.4 %
NEUTROPHILS # BLD AUTO: 6.22 X10*3/UL (ref 1.2–7.7)
NEUTROPHILS NFR BLD AUTO: 79.6 %
NRBC BLD-RTO: 0 /100 WBCS (ref 0–0)
PLATELET # BLD AUTO: 158 X10*3/UL (ref 150–450)
POTASSIUM SERPL-SCNC: 4 MMOL/L (ref 3.5–5.3)
PROT SERPL-MCNC: 5.9 G/DL (ref 6.4–8.2)
RAD ONC MSQ ACTUAL FRACTIONS DELIVERED: 13
RAD ONC MSQ ACTUAL SESSION DELIVERED DOSE: 200 CGRAY
RAD ONC MSQ ACTUAL TOTAL DOSE: 2600 CGRAY
RAD ONC MSQ ELAPSED DAYS: 17
RAD ONC MSQ LAST DATE: NORMAL
RAD ONC MSQ PRESCRIBED FRACTIONAL DOSE: 200 CGRAY
RAD ONC MSQ PRESCRIBED NUMBER OF FRACTIONS: 35
RAD ONC MSQ PRESCRIBED TECHNIQUE: NORMAL
RAD ONC MSQ PRESCRIBED TOTAL DOSE: 7000 CGRAY
RAD ONC MSQ PRESCRIPTION PATTERN COMMENT: NORMAL
RAD ONC MSQ START DATE: NORMAL
RAD ONC MSQ TREATMENT COURSE NUMBER: 1
RAD ONC MSQ TREATMENT SITE: NORMAL
RBC # BLD AUTO: 3.68 X10*6/UL (ref 4–5.2)
SODIUM SERPL-SCNC: 138 MMOL/L (ref 136–145)
WBC # BLD AUTO: 7.8 X10*3/UL (ref 4.4–11.3)

## 2025-04-04 PROCEDURE — 77386 HC INTENSITY-MODULATED RADIATION THERAPY (IMRT), COMPLEX: CPT | Performed by: STUDENT IN AN ORGANIZED HEALTH CARE EDUCATION/TRAINING PROGRAM

## 2025-04-04 PROCEDURE — 85025 COMPLETE CBC W/AUTO DIFF WBC: CPT

## 2025-04-04 PROCEDURE — 36415 COLL VENOUS BLD VENIPUNCTURE: CPT

## 2025-04-04 PROCEDURE — 84075 ASSAY ALKALINE PHOSPHATASE: CPT

## 2025-04-04 PROCEDURE — 83735 ASSAY OF MAGNESIUM: CPT

## 2025-04-04 ASSESSMENT — PAIN SCALES - GENERAL: PAINLEVEL_OUTOF10: 8

## 2025-04-04 NOTE — PROGRESS NOTES
"NUTRITION Follow Up NOTE    Nutrition Assessment     Reason for Visit:  Ingris Mckinney is a 69 y.o. female who presents for primary SCC supraglottis  Pt in for radiation consult, asked to see 2/2 dx, weight status, weight loss    TX: Cisplatin with concurrent radiation  Patient lives in [place] alone, Zulema lives in [place], other sister in [place] area. Sisters help pt with buying groceries etc    Contact Zulema Mckinney [phone]    Her insurance company provides transportation    4/4- [facility] in radiation. She is alone  Has some neck swelling/fluid - seen by nurse and Dr Rivas  PEG placed yesterday 4/3 (16F legacy tube)   Pt is anxious about PEG and \"messing it up\"  Provided reassurance and education  Per surgical note PEG not to be used for 24 hours, placed at ~12:00 pm 4/3/25.  After OTV and treatment, took pt to exam room.   Undressed PEG site, cleaned area, flushed PEG and put split 2x2 gauze on and taped up feeding tube. Tube length is short.   PEG flushed easily.   Education provided to pt through process, recommended FWF 1X/day  Pt able to do teachback  Pt sent home with 6 2X2 gauze and some medical tape    Reviewed PEG teaching points  Pt will be sore/tender for up to ~ 2 weeks, if intense or sharp pain or change in pain contact GI  If tube should come out for any reason go to ED immediately  Flushing- syringe use and care, flushing daily if not using PEG for nutrition, FWF before and after each feed    Will address enteral supplies next week  VIANNEY Callaway, awaiting additional documentation, Dr Rivas and Michael WOMACK aware     Patient Active Problem List   Diagnosis    Acute on chronic respiratory failure with hypoxia and hypercapnia    Anxiety    Balance disorder    Blood loss anemia    Closed fracture of pelvis (Multi)    Closed fracture of right inferior pubic ramus    Degenerative joint disease (DJD) of hip    Depression    Essential hypertension    ETOH abuse    Hyperlipidemia    Hypoxemia    Centrilobular " "emphysema (Multi)    COPD with exacerbation (Multi)    Severe protein-calorie malnutrition (Multi)    Lesion of larynx    Primary squamous cell carcinoma of supraglottis       Nutrition Significant Labs:  Lab Results   Component Value Date/Time    GLUCOSE 111 (H) 03/31/2025 0733     03/31/2025 0733    K 4.0 03/31/2025 0733    CL 99 03/31/2025 0733    CO2 36 (H) 03/31/2025 0733    ANIONGAP 10 03/31/2025 0733    BUN 16 03/31/2025 0733    CREATININE 0.46 (L) 03/31/2025 0733    EGFR >90 03/31/2025 0733    CALCIUM 9.2 03/31/2025 0733    ALBUMIN 4.0 03/31/2025 0733    ALKPHOS 37 03/31/2025 0733    PROT 6.4 03/31/2025 0733    AST 19 03/31/2025 0733    BILITOT 0.5 03/31/2025 0733    ALT 18 03/31/2025 0733    MG 1.95 03/31/2025 0733     No results found for: \"VITD25\"      Anthropometrics:                         Daily Weight  04/04/25 : (!) 44.8 kg (98 lb 12.3 oz)  03/31/25 : (!) 43 kg (94 lb 12.8 oz)  03/31/25 : (!) 43 kg (94 lb 12.8 oz)  03/27/25 : 46.1 kg (101 lb 10.1 oz)  03/24/25 : (!) 42.8 kg (94 lb 7.5 oz)  03/24/25 : (!) 42.8 kg (94 lb 7.5 oz)  03/20/25 : (!) 43.5 kg (95 lb 14.4 oz)  03/20/25 : (!) 43.5 kg (95 lb 12.6 oz)  03/18/25 : (!) 40.9 kg (90 lb 0.9 oz)  03/17/25 : (!) 42.2 kg (93 lb 0.6 oz)  03/13/25 : (!) 40.9 kg (90 lb 2.7 oz)  03/10/25 : (!) 41.7 kg (92 lb 0.7 oz)  03/05/25 : (!) 35.4 kg (78 lb)  02/10/25 : (!) 35.6 kg (78 lb 7.7 oz)  02/10/25 : (!) 35.6 kg (78 lb 7.7 oz)  01/24/25 : (!) 38.2 kg (84 lb 3.2 oz)  01/13/25 : (!) 39 kg (86 lb)  12/30/24 : (!) 39.2 kg (86 lb 6.4 oz)  12/09/24 : (!) 3.629 kg (8 lb)  10/18/24 : (!) 42.2 kg (93 lb)     3/20- radonc wt 43.4kg / 95#  4/4- OTV wt 44.8 kg / 98#    Weight Change %:       Nutrition History:  Food & Nutrition History:    Food Allergies:    Food Intolerances:    Vitamin/mineral intake:       Herbal supplements:    Medication and Complementary/Alternative Medicine Use:    Dentition:    Sleep Habits:      Diet Recall:  Meal 1:    Snack 1:    Meal 2:  "   Snack 2:    Meal 3:    Snack 3: ice cream sometimes  Food Variety:    Oral Nutrition Supplement Use: Oral Nutrition Supplements: Boost Very High Calorie Frequency: once daily Calories: 530 Protein: 22  Fluid Intake:    Energy Intake: Fair 50-75 %    Food Preparation:  Cooking: Patient, Family  Grocery Shopping: Family  Dining Out:      Medications:  Current Outpatient Medications   Medication Instructions    albuterol 90 mcg/actuation inhaler 2 puffs, Every 4 hours PRN    albuterol 2.5 mg, Every 4 hours PRN    alendronate (FOSAMAX) 70 mg, oral, Every 7 days, Take in the morning with a full glass of water, on an empty stomach, and do not take anything else by mouth or lie down for the next 30 min.    dexAMETHasone (DECADRON) 8 mg, oral, Daily, For 3 days per week starting the day after treatment.    Dulera 200-5 mcg/actuation inhaler 2 puffs, 2 times daily    OLANZapine (ZYPREXA) 5 mg, oral, Nightly    ondansetron (ZOFRAN) 8 mg, oral, Every 8 hours PRN    predniSONE (DELTASONE) 50 mg, oral, Daily, Administer 13 hours (at 9pm on 4/2/25), 7 hours (at 3am on 4/3), and 1 hour (at 9am on 4/3) prior to procedure    prochlorperazine (COMPAZINE) 10 mg, oral, Every 6 hours PRN    rosuvastatin (CRESTOR) 10 mg, oral, Daily    umeclidinium (Incruse Ellipta) 62.5 mcg/actuation inhalation 1 puff, Daily RT       Nutrition Focused Physical Exam Findings:3/31/25    Subcutaneous Fat Loss:        Muscle Wasting:       Physical Findings:  Digestive System Findings: Constipation (resolved Wed with Miralax, discussed along with nurse partner taking Miralax daily or every other day to maintain bowel movements, not waiting to correct constipation)       Estimated Needs:            Total Energy Estimated Needs in 24 hours (kCal): 1250 kCal  Energy Estimated Needs per kg Body Weight in 24 hours (kCal/kg): 35 kCal/kg (actual BW used for assessment weight)  Total Protein Estimated Needs in 24 Hours (g): 53 g  Protein Estimated Needs per kg  Body Weight in 24 Hours (g/kg): 1.5 g/kg  Total Fluid Estimated Needs in 24 Hours (mL): 1250 mL  Method for Estimating 24 Hour Fluid Needs: 1 calorie/kg                       Nutrition Diagnosis   Malnutrition Diagnosis  Patient has Malnutrition Diagnosis: Yes  Diagnosis Status: Active  Malnutrition Diagnosis: Severe malnutrition related to chronic disease or condition  Related to: 9% weight loss over 1 month, poor oral intake, findings on NFPE (severe depletion fat and muscle stores)    Nutrition Diagnosis  Patient has Nutrition Diagnosis: Yes  Diagnosis Status (1): Active  Nutrition Diagnosis 1: Predicted inadequate energy intake  Related to (1): pathophsyiology of disease and treatment  As Evidenced by (1): anticipated nutrition impact symptoms affecting oral intake and weight  Additional Nutrition Diagnosis: Diagnosis 3  Diagnosis Status (2): Active  Nutrition Diagnosis 2: Increased energy expenditure  Related to (2): increased metabolic demand, disease process  As Evidenced by (2): cancer diagnosis, planned treatment  Diagnosis Status (3): Active  Nutrition Diagnosis 3: Disordered eating pattern  Related to (3): patients meal habits  As Evidenced by (3): pt not eating, caring for self, will not prepare food       Nutrition Interventions/Recommendations   Nutrition Prescription: Oral nutrition soft/puree/liquid diet, high calorie high protein, management of NIS    Nutrition Interventions:   Food and Nutrient Delivery: Meals & Snacks: Energy-modified diet, Fluid-modified diet, Modification of schedule of oral intake, Protein-modified diet, Texture-Modified Diet  Goals: Encouraged to consume 2 meals per day and emphasizing every bite counts, have snacks like ice cream or PNB when she can.  Avoid dry harsh  foods and acidic foods/beverages. Adequate fluid intake. Continue to reinforce importance of nutrition, oral diet, energy needs encouraging oral intake. Stressed 2 Boost VHC + frozen meal and anything else she  can eat - pt just gets lazy or forgets to eat  Enteral Nutrition:  (Plan is for pt to get PEG.  when indicated recommend Isosource 1.5 (3.5) cartons per day to provide 1312 calories, 60 grams protein, 669 ml free water)  Medical Food Supplement: Commercial beverage medical food supplement therapy  Goals: Continue Boost VHC BID  Vitamin Supplement Therapy:  (recommend chewable MV)  Feeding Assistance Management: Mouth care management  Goals: as directed     Coordination of Care: Collaboration and referral of nutrition care:  (collaboration with other providers)  Goals: work with team to achieve best possible outcomes     DME: [facility]  Boost VHC BID  Provides 1060 calories and 44 grams protein  Comments:  Enteral orders sent 4/3/25 Isosource 1.5 3.5 cartons per day to provide 1312 calories, 60 grams protein 669 ml free water  Pt [facility]    Nutrition Education:   Nutrition Education Content:            Nutrition Monitoring and Evaluation   Food and Nutrient Intake  Monitoring and Evaluation Plan: Energy intake, Meal/snack pattern, Protein intake, Fluid intake, Amount of food  Energy Intake: Estimated energy intake  Criteria: Meet >75% estimated energy needs- food recall  Fluid Intake: Estimated fluid intake  Criteria: maintain hydration  Amount of Food: Medical food intake  Criteria: ONS as recommeded  Meal/Snack Pattern: Estimated meal and snack pattern  Criteria: 2-4 small meals/snacks per day  Estimated protein intake: Estimated protein intake  Criteria: meet >75% estimated protein needs - food recall    Anthropometric measurements  Monitoring and Evaluation Plan: Weight  Body Weight: Measured body weight  Criteria: maintain/gain weight         Nutrition Focused Physical Findings  Monitoring and Evaluation Plan: Adipose, Muscle  Adipose Finding: Loss of subcutaneous fat  Criteria: prevent further losses  Muscle Finding: Muscle atrophy  Criteria: prevent further losses         Follow Up:  through treatment      Time Spent  Prep time on day of patient encounter: 10 minutes  Time spent directly with patient, family or caregiver: 30 minutes  Additional Time Spent on Patient Care Activities: 15 minutes  Documentation Time: 25 minutes  Other Time Spent: 0 minutes  Total: 80 minutes

## 2025-04-04 NOTE — TELEPHONE ENCOUNTER
4/4/25 0/24 Brother in law calling for patient. Patient woke up at 5 am with swelling in neck, chin, and face. She is now sitting up and the swelling has not changed. Patient is able to swallow without difficulty. Will notify Dr. Riavs and call back if there is something more he would like done. In the mean time, patient to keep appointment for RT and OTV at 12:30 pm.Family verbalizes understanding with verbal teach back. Tamiko Mahoney MSN, RN, OCN

## 2025-04-04 NOTE — TELEPHONE ENCOUNTER
Patient/ family called regarding referral sent to [facility] by Dr Noemy Rivas.  Family would like to call office back to schedule.  Provided contact number to call [facility] to schedule appointment.

## 2025-04-04 NOTE — TELEPHONE ENCOUNTER
Ingris and her brother-in-law called the office; she had a feeding tube placed at [facility] yesterday. This morning she woke up at home and her neck and throat are swollen (about 3-4x the normal size). She is still able to breathe and swallow without difficulty. Denies any fever or chest pain.   No skin changes to the neck/throat area.  Slight swelling to her face as well.   Most recent radiation to her neck was 4/2.   She has never experienced swelling like this before.     Secure Chat sent to Dr. Rivas and Michael Fregoso.     Call transferred to Dr. Rivas's RN partner, Tamiko.     Additional Information   Commented on: Where is your swelling?     Neck/throat   Commented on: Do you have a mediport or catheter? Have you had any IVs recently?     Feeding tube placed 4/3    Protocols used: Swelling/Deep Venous Thrombosis (DVT)

## 2025-04-04 NOTE — PROGRESS NOTES
Radiation Oncology On Treatment Visit    Patient Name:  Ingris Mckinney  MRN:  [id number]  :  1955    Referring Provider: No ref. provider found  Primary Care Provider: Marge Camacho MD  Care Team: Patient Care Team:  Marge Camacho MD as PCP - General (Internal Medicine)  Ngoc Selby MD as PCP - [employer]age PCP  Noemy Rivas DO as Radiation Oncologist (Radiation Oncology)  Tamiko Mahoney RN as Registered Nurse (Radiation Therapy)  Daphney Loo RN as Nurse Navigator (Hematology and Oncology)  Kimmie Waters MD MPH as Consulting Physician (Hematology and Oncology)  RADHA Mendoza-CNP as Nurse Practitioner (Hematology and Oncology)    Date of Service: 2025     Diagnosis:   Specialty Problems          Radiation Oncology Problems    Primary squamous cell carcinoma of supraglottis         Treatment Summary:  Radiation Therapy    Treatment Period Technique Fraction Dose Fractions Total Dose   Course 1 3/18/2025-2025  (days elapsed: 17)         H&N 3/18/2025-2025 VMAT 200 / 200 cGy  2600 / 7,000 cGy     SUBJECTIVE: Missed treatment yesterday due to feeding tube placement. Noted to have some generalized facial/neck edema today but is completely asymptomatic. Etiology is unclear, but could be related to steroids given to prevent allergic reaction to contrast yesterday. Denies any shortness of breath, breathing difficulties or swallowing difficulties. Continues to tolerate p.o. diet, and has not yet started to use feeding tube. Notes some decrease in swelling today after being out of bed. Advised to continue to elevate head of bed over the weekend, and will reevaluate on Monday. Recommended to go to the emergency room if she develops any symptoms of breathing or swallowing difficulty.      OBJECTIVE:   Vital Signs:  /89   Pulse 75   Temp 36.1 °C (97 °F)   Resp 18   Wt (!) 44.8 kg (98 lb 12.3 oz)   SpO2 97%   BMI 18.18 kg/m²     Other Pertinent Findings:      Toxicity Assessment          3/20/2025    10:44 3/20/2025    10:46 3/27/2025    11:14 4/4/2025    13:06   Toxicity Assessment   Treatment  and neck  Head and neck Head and neck   Anorexia Grade 0       eating regular food plus 2 anna calorie Boosts/day Grade 0       drinking 2 high calorie Boosts/day and eating regular foods Grade 0       Drinking 2 high protein shakes/day and eating what she wants Grade 1       Eating soft foods and drinking 1-2 high calorie boosts/day. Patient just had Peg placed yesterday   Anxiety  Grade 1 Grade 0 Grade 0   Dehydration  Grade 0 Grade 0 Grade 0   Depression  Grade 0 Grade 0 Grade 0   Dermatitis Radiation  Grade 0 Grade 0 Grade 0   Diarrhea  Grade 1       with anxiety Grade 0 Grade 0   Fatigue  Grade 1 Grade 1       naps during the day Grade 1       naps during the day   Nausea  Grade 0 Grade 0 Grade 0   Pain  Grade 0 Grade 0 Grade 2       pain level 8 to abdomen--from Peg placement   Vomiting  Grade 0 Grade 0 Grade 0   Dysphagia  Grade 0 Grade 0 Grade 0   Esophagitis  Grade 0 Grade 0 Grade 1   Mucositis Oral  Grade 0       using salt and soda rinse and Blis lozenges Grade 0 Grade 0   Hearing Impaired  Grade 0 Grade 0 Grade 0   Blurred Vision  Grade 0 Grade 0 Grade 0   Dry Eye  Grade 0 Grade 0 Grade 0   Eye Pain  Grade 0 Grade 0 Grade 0   Dry Mouth  Grade 0 Grade 1 Grade 0   Ear Pain  Grade 0 Grade 0 Grade 0   External Ear Pain  Grade 0 Grade 0 Grade 0   Tinnitus  Grade 0 Grade 0 Grade 0   Oral Pain  Grade 0 Grade 0 Grade 0   Salivary Duct Inflammation   Grade 0 Grade 0   Edema Face  Grade 0 Grade 0    Neck Edema   Grade 0 Grade 1       new. started early this am   Trismus  Grade 0 Grade 0 Grade 0   Dysarthria  Grade 0 Grade 0 Grade 0   Dysesthesia  Grade 0 Grade 0 Grade 0   Dysgeusia  Grade 0 Grade 0 Grade 0   Aspiration  Grade 0 Grade 0 Grade 0   Hoarseness  Grade 2 Grade 1       baseline Grade 3   Voice Alteration  Grade 2 Grade 1 Grade 2        Assessment /  Plan:  The patient is tolerating radiation therapy as anticipated.  Continue per current treatment plan.

## 2025-04-07 ENCOUNTER — INFUSION (OUTPATIENT)
Dept: HEMATOLOGY/ONCOLOGY | Facility: CLINIC | Age: 70
End: 2025-04-07
Payer: MEDICARE

## 2025-04-07 ENCOUNTER — HOSPITAL ENCOUNTER (OUTPATIENT)
Dept: RADIATION ONCOLOGY | Facility: CLINIC | Age: 70
Setting detail: RADIATION/ONCOLOGY SERIES
Discharge: HOME | End: 2025-04-07
Payer: MEDICARE

## 2025-04-07 ENCOUNTER — OFFICE VISIT (OUTPATIENT)
Dept: HEMATOLOGY/ONCOLOGY | Facility: CLINIC | Age: 70
End: 2025-04-07
Payer: MEDICARE

## 2025-04-07 ENCOUNTER — APPOINTMENT (OUTPATIENT)
Dept: RADIATION ONCOLOGY | Facility: CLINIC | Age: 70
End: 2025-04-07
Payer: MEDICARE

## 2025-04-07 ENCOUNTER — NUTRITION (OUTPATIENT)
Dept: HEMATOLOGY/ONCOLOGY | Facility: CLINIC | Age: 70
End: 2025-04-07
Payer: MEDICARE

## 2025-04-07 ENCOUNTER — TELEPHONE (OUTPATIENT)
Dept: PALLIATIVE MEDICINE | Facility: HOSPITAL | Age: 70
End: 2025-04-07

## 2025-04-07 VITALS
DIASTOLIC BLOOD PRESSURE: 72 MMHG | WEIGHT: 95.24 LBS | HEART RATE: 91 BPM | BODY MASS INDEX: 17.53 KG/M2 | TEMPERATURE: 97.5 F | SYSTOLIC BLOOD PRESSURE: 118 MMHG | RESPIRATION RATE: 18 BRPM | OXYGEN SATURATION: 94 %

## 2025-04-07 VITALS
SYSTOLIC BLOOD PRESSURE: 118 MMHG | RESPIRATION RATE: 18 BRPM | DIASTOLIC BLOOD PRESSURE: 72 MMHG | WEIGHT: 95.13 LBS | HEART RATE: 91 BPM | OXYGEN SATURATION: 94 % | TEMPERATURE: 97.5 F | BODY MASS INDEX: 17.51 KG/M2

## 2025-04-07 DIAGNOSIS — C32.8 MALIGNANT NEOPLASM OF OVERLAPPING SITES OF LARYNX (MULTI): ICD-10-CM

## 2025-04-07 DIAGNOSIS — C32.1 PRIMARY SQUAMOUS CELL CARCINOMA OF SUPRAGLOTTIS: Primary | ICD-10-CM

## 2025-04-07 DIAGNOSIS — Z51.0 ENCOUNTER FOR ANTINEOPLASTIC RADIATION THERAPY: ICD-10-CM

## 2025-04-07 LAB
RAD ONC MSQ ACTUAL FRACTIONS DELIVERED: 14
RAD ONC MSQ ACTUAL SESSION DELIVERED DOSE: 200 CGRAY
RAD ONC MSQ ACTUAL TOTAL DOSE: 2800 CGRAY
RAD ONC MSQ ELAPSED DAYS: 20
RAD ONC MSQ LAST DATE: NORMAL
RAD ONC MSQ PRESCRIBED FRACTIONAL DOSE: 200 CGRAY
RAD ONC MSQ PRESCRIBED NUMBER OF FRACTIONS: 35
RAD ONC MSQ PRESCRIBED TECHNIQUE: NORMAL
RAD ONC MSQ PRESCRIBED TOTAL DOSE: 7000 CGRAY
RAD ONC MSQ PRESCRIPTION PATTERN COMMENT: NORMAL
RAD ONC MSQ START DATE: NORMAL
RAD ONC MSQ TREATMENT COURSE NUMBER: 1
RAD ONC MSQ TREATMENT SITE: NORMAL

## 2025-04-07 PROCEDURE — 77386 HC INTENSITY-MODULATED RADIATION THERAPY (IMRT), COMPLEX: CPT | Performed by: STUDENT IN AN ORGANIZED HEALTH CARE EDUCATION/TRAINING PROGRAM

## 2025-04-07 PROCEDURE — 99214 OFFICE O/P EST MOD 30 MIN: CPT | Performed by: NURSE PRACTITIONER

## 2025-04-07 PROCEDURE — 2500000004 HC RX 250 GENERAL PHARMACY W/ HCPCS (ALT 636 FOR OP/ED): Performed by: STUDENT IN AN ORGANIZED HEALTH CARE EDUCATION/TRAINING PROGRAM

## 2025-04-07 PROCEDURE — 96361 HYDRATE IV INFUSION ADD-ON: CPT | Mod: INF

## 2025-04-07 PROCEDURE — 1126F AMNT PAIN NOTED NONE PRSNT: CPT | Performed by: NURSE PRACTITIONER

## 2025-04-07 PROCEDURE — 99214 OFFICE O/P EST MOD 30 MIN: CPT | Mod: 25 | Performed by: NURSE PRACTITIONER

## 2025-04-07 PROCEDURE — 3078F DIAST BP <80 MM HG: CPT | Performed by: NURSE PRACTITIONER

## 2025-04-07 PROCEDURE — 2500000001 HC RX 250 WO HCPCS SELF ADMINISTERED DRUGS (ALT 637 FOR MEDICARE OP): Performed by: NURSE PRACTITIONER

## 2025-04-07 PROCEDURE — 96413 CHEMO IV INFUSION 1 HR: CPT

## 2025-04-07 PROCEDURE — 1159F MED LIST DOCD IN RCRD: CPT | Performed by: NURSE PRACTITIONER

## 2025-04-07 PROCEDURE — 96367 TX/PROPH/DG ADDL SEQ IV INF: CPT

## 2025-04-07 PROCEDURE — 3074F SYST BP LT 130 MM HG: CPT | Performed by: NURSE PRACTITIONER

## 2025-04-07 PROCEDURE — 1160F RVW MEDS BY RX/DR IN RCRD: CPT | Performed by: NURSE PRACTITIONER

## 2025-04-07 RX ORDER — PROCHLORPERAZINE EDISYLATE 5 MG/ML
10 INJECTION INTRAMUSCULAR; INTRAVENOUS EVERY 6 HOURS PRN
Status: DISCONTINUED | OUTPATIENT
Start: 2025-04-07 | End: 2025-04-07 | Stop reason: HOSPADM

## 2025-04-07 RX ORDER — EPINEPHRINE 0.3 MG/.3ML
0.3 INJECTION SUBCUTANEOUS EVERY 5 MIN PRN
Status: DISCONTINUED | OUTPATIENT
Start: 2025-04-07 | End: 2025-04-07 | Stop reason: HOSPADM

## 2025-04-07 RX ORDER — PROCHLORPERAZINE MALEATE 10 MG
10 TABLET ORAL EVERY 6 HOURS PRN
Status: DISCONTINUED | OUTPATIENT
Start: 2025-04-07 | End: 2025-04-07 | Stop reason: HOSPADM

## 2025-04-07 RX ORDER — PALONOSETRON 0.05 MG/ML
0.25 INJECTION, SOLUTION INTRAVENOUS ONCE
Status: COMPLETED | OUTPATIENT
Start: 2025-04-07 | End: 2025-04-07

## 2025-04-07 RX ORDER — FAMOTIDINE 10 MG/ML
20 INJECTION, SOLUTION INTRAVENOUS ONCE AS NEEDED
Status: DISCONTINUED | OUTPATIENT
Start: 2025-04-07 | End: 2025-04-07 | Stop reason: HOSPADM

## 2025-04-07 RX ORDER — DEXAMETHASONE 6 MG/1
12 TABLET ORAL ONCE
Status: COMPLETED | OUTPATIENT
Start: 2025-04-07 | End: 2025-04-07

## 2025-04-07 RX ORDER — HEPARIN 100 UNIT/ML
500 SYRINGE INTRAVENOUS AS NEEDED
OUTPATIENT
Start: 2025-04-07

## 2025-04-07 RX ORDER — DEXTROMETHORPHAN HYDROBROMIDE, GUAIFENESIN 5; 100 MG/5ML; MG/5ML
650 LIQUID ORAL ONCE
Status: CANCELLED | OUTPATIENT
Start: 2025-04-07 | End: 2025-04-07

## 2025-04-07 RX ORDER — DIPHENHYDRAMINE HYDROCHLORIDE 50 MG/ML
50 INJECTION, SOLUTION INTRAMUSCULAR; INTRAVENOUS AS NEEDED
Status: DISCONTINUED | OUTPATIENT
Start: 2025-04-07 | End: 2025-04-07 | Stop reason: HOSPADM

## 2025-04-07 RX ORDER — ACETAMINOPHEN 325 MG/1
650 TABLET ORAL ONCE
Status: COMPLETED | OUTPATIENT
Start: 2025-04-07 | End: 2025-04-07

## 2025-04-07 RX ORDER — ALBUTEROL SULFATE 0.83 MG/ML
3 SOLUTION RESPIRATORY (INHALATION) AS NEEDED
Status: DISCONTINUED | OUTPATIENT
Start: 2025-04-07 | End: 2025-04-07 | Stop reason: HOSPADM

## 2025-04-07 RX ORDER — HEPARIN SODIUM,PORCINE/PF 10 UNIT/ML
50 SYRINGE (ML) INTRAVENOUS AS NEEDED
OUTPATIENT
Start: 2025-04-07

## 2025-04-07 RX ADMIN — CISPLATIN 54 MG: 1 INJECTION, SOLUTION INTRAVENOUS at 10:46

## 2025-04-07 RX ADMIN — DEXAMETHASONE 12 MG: 6 TABLET ORAL at 10:01

## 2025-04-07 RX ADMIN — PALONOSETRON HYDROCHLORIDE 0.25 MG: 0.25 INJECTION INTRAVENOUS at 10:01

## 2025-04-07 RX ADMIN — POTASSIUM CHLORIDE 999 ML/HR: 2 INJECTION, SOLUTION, CONCENTRATE INTRAVENOUS at 08:51

## 2025-04-07 RX ADMIN — ACETAMINOPHEN 650 MG: 325 TABLET ORAL at 12:52

## 2025-04-07 RX ADMIN — FOSAPREPITANT 150 MG: 150 INJECTION, POWDER, LYOPHILIZED, FOR SOLUTION INTRAVENOUS at 10:01

## 2025-04-07 RX ADMIN — SODIUM CHLORIDE 1000 ML: 9 INJECTION, SOLUTION INTRAVENOUS at 11:48

## 2025-04-07 ASSESSMENT — PAIN DESCRIPTION - LOCATION: LOCATION: ABDOMEN

## 2025-04-07 ASSESSMENT — PAIN SCALES - GENERAL
PAINLEVEL_OUTOF10: 7
PAINLEVEL_OUTOF10: 0-NO PAIN
PAINLEVEL_OUTOF10: 7
PAINLEVEL_OUTOF10: 0-NO PAIN

## 2025-04-07 ASSESSMENT — PAIN DESCRIPTION - ORIENTATION: ORIENTATION: MID

## 2025-04-07 ASSESSMENT — PAIN DESCRIPTION - DESCRIPTORS: DESCRIPTORS: ACHING;SHARP;STABBING

## 2025-04-07 NOTE — TELEPHONE ENCOUNTER
----- Message from Winifred GALLEGOS sent at 4/7/2025 11:01 AM EDT -----  Regarding: appointment  Patient is scheduled for a NPV scheduled with LORIE Guillen on 05/05. The family would like to get her in sooner than 05/05.  They would like to stay in [place]. Would you have something sooner for her. She has had surgery on Thursday and is currently in Chemo and has Radiation.   Please advise and Thank You    Spoke to patients sister Yarelis and explained the first appointment could be at main and then in the future [place]. Provided a couple of dates that could possibly work with her existing schedule, and Yarelis stated transportation issues and the dates being only a week out from her appointment in May, they will keep the existing appointment

## 2025-04-07 NOTE — PROGRESS NOTES
Subjective:   Patient Name: Ingris Mckinney    : 1955     MRN: [id number]     Age: 69 y.o.     Gender: female  Referring Provider: ARNAV    CC: Management of newly diagnosed L sided supraglottic squamous cell carcinoma ([id number])    Presenting History (3/13/2025): At time of initial presentation a 69 y.o. female, current smoker (started at age 19,1/3 pack per day) with a past medical history of osteoporosis, HLD, COPD (occasionally on O2) referred for management of suprglottic cancer.      She has been following with ENT for history of vocal cord paralysis without CT abnormality since , which was successfully managed with injection medialization.     She then developed acute worsening of her voice in 2024, with scope exam shortly after that showed mucosal irregularity along the anterior left false cord. The left true vocal cord was fixed in the paramedian position. The right true vocal cord exhibited normal movement. She was taken to the operating room for direct and biopsy on 2025. Operative findings showed a left false cord mass lesion which was not obstructive, and extended from anterior to posterior. The mass extended to the laryngeal ventricle on the left, but spared the true vocal cord. The mass did not appear to cross to the right. The vallecula, base of tongue and hypopharynx were normal. Biopsies from the left supraglottic mass were positive for invasive moderately differentiated squamous cell carcinoma.     CT neck on 2025 showed an enhancing mass in the left supraglottic larynx, 1.0 x 1.7 x 1.4 cm in size, which approached the epiglottis without definitive invasion. There was an area of irregularity/thickening on the right true vocal cord of unclear etiology.     2025: PET/CT: 1.  Focal intense hypermetabolic activity at the left aryepiglottic fold compatible with biopsy-proven squamous cell carcinoma. 2. No evidence of hypermetabolic kevin or distant metastatic disease.3.  Mild hypermetabolic activity associated with the right upper lobe pulmonary nodule. Continued attention on follow-up chest CT is recommended.    She was seen by ENT Dr. Lo and unfortunately was deemed not surgically resectable. She presented [facility] for a consultation accompanied by her brother in law. She livers on her own. She states that she has been lazy over the past months and has lost a lot of weight. She doesn't really eat much food other than cereals. Luckily her sister and in-law brought her dinner. She has gained 12 lb in the past weeks. She denies any dysphagia or odynophagia. NO pain. NO numbness or tingling. No heating issues. No fever or chills. No n/v/d/c    Social Hx:   No etoh or illicit drugs.   2 sisters   No kids      Treatment Summary:  - March 18, 2025 C1D1 Cisplatin - weekly with concurrent RT     Current Treatment:  - March 18, 2025 C1D1 Cisplatin - weekly with concurrent RT   - March 25, 2025 C1D8 Cisplatin  - March 31, 2025 C1D15 Cisplatin   - April 7, 2025 C1D22 Cisplatin     Interval History (4/7/2025):    Patient presents today for toxicity check and treatment readiness visit. She is tolerating Cisplatin very well. Her brother contacted the office on Friday morning with concern patient had facial/neck swelling. No swelling or fullness appreciated on today's exam. Of note she does have a contrast allergy and underwent g-tube placement on Thursday.    She denies any fevers, chills or night sweats. Chronic cough - COPD. No chest pain. Chronic JONES interferes with ADLs. On home oxygen.  Now has portable oxygen concentrator. No nausea or vomiting. No diarrhea. No urinary symptoms. No rash. No neuropathy. Reports she is eating without difficulty. Sister having prepared meals delivered. Has boost.     PEG tube placed Thursday.Minimal pain around site.     Review of Systems:  A review of systems has been completed and are negative for complaints except what is stated in the HPI and/or past  medical history      Medical History:   Past Medical History:   Diagnosis Date    Anxiety     At risk for falling     COPD (chronic obstructive pulmonary disease) (Multi)     Hypercholesteremia     Hypoxia     Larynx cancer (Multi)     Malnutrition (Multi)     Personal history of other diseases of the respiratory system     History of chronic obstructive lung disease    Respiratory failure (Multi)     Tachycardia     Weakness        Surgical History:   Past Surgical History:   Procedure Laterality Date    CT GUIDED IMAGING FOR NEEDLE PLACEMENT  05/05/2020    CT GUIDED IMAGING FOR NEEDLE PLACEMENT [facility]    LARYNGOSCOPY      direct w/ bx    OTHER SURGICAL HISTORY  11/11/2022    Hip surgery    OTHER SURGICAL HISTORY  11/11/2022    Arm surgery    OTHER SURGICAL HISTORY  11/11/2022    Leg surgery       Family History:  Family History   Problem Relation Name Age of Onset    Other (chronic lung disease) Other         Allergies:  Allergies   Allergen Reactions    Scallops Anaphylaxis    Iodinated Contrast Media Itching       Meds (Current):    Current Outpatient Medications:     albuterol 2.5 mg /3 mL (0.083 %) nebulizer solution, Take 3 mL (2.5 mg) by nebulization every 4 hours if needed for wheezing., Disp: , Rfl:     albuterol 90 mcg/actuation inhaler, Inhale 2 puffs every 4 hours if needed., Disp: , Rfl:     alendronate (Fosamax) 70 mg tablet, Take 1 tablet (70 mg) by mouth every 7 days. Take in the morning with a full glass of water, on an empty stomach, and do not take anything else by mouth or lie down for the next 30 min., Disp: 12 tablet, Rfl: 3    dexAMETHasone (Decadron) 4 mg tablet, Take 2 tablets (8 mg) by mouth once daily. For 3 days per week starting the day after treatment., Disp: 42 tablet, Rfl: 0    Dulera 200-5 mcg/actuation inhaler, Inhale 2 puffs twice a day., Disp: , Rfl:     OLANZapine (ZyPREXA) 5 mg tablet, Take 1 tablet (5 mg) by mouth once daily at bedtime., Disp: 30 tablet, Rfl: 1     ondansetron (Zofran) 8 mg tablet, Take 1 tablet (8 mg) by mouth every 8 hours if needed for nausea or vomiting., Disp: 30 tablet, Rfl: 5    prochlorperazine (Compazine) 10 mg tablet, Take 1 tablet (10 mg) by mouth every 6 hours if needed for nausea or vomiting., Disp: 30 tablet, Rfl: 5    rosuvastatin (Crestor) 10 mg tablet, Take 1 tablet (10 mg) by mouth once daily., Disp: 90 tablet, Rfl: 2    umeclidinium (Incruse Ellipta) 62.5 mcg/actuation inhalation, Inhale 1 puff (62.5 mcg) once daily., Disp: , Rfl:   No current facility-administered medications for this visit.    Facility-Administered Medications Ordered in Other Visits:     albuterol 2.5 mg /3 mL (0.083 %) nebulizer solution 3 mL, 3 mL, nebulization, PRN, Kimmie Waters MD MPH    CISplatin (Platinol) 54 mg in sodium chloride 0.9% 601 mL IV, 40 mg/m2 (Treatment Plan Recorded), intravenous, Once, Kimmie Waters MD MPH, Last Rate: 601 mL/hr at 04/07/25 1046, 54 mg at 04/07/25 1046    dextrose 5 % in water (D5W) bolus 500 mL, 500 mL, intravenous, PRN, Kimmie Waters MD MPH    diphenhydrAMINE (BENADryl) injection 50 mg, 50 mg, intravenous, PRN, Kimmie Waters MD MPH    EPINEPHrine (Epipen) injection syringe 0.3 mg, 0.3 mg, intramuscular, q5 min PRN, Kimmie Waters MD MPH    famotidine PF (Pepcid) injection 20 mg, 20 mg, intravenous, Once PRN, Kimmie Waters MD MPH    methylPREDNISolone sod succinate (SOLU-Medrol) 40 mg/mL injection 40 mg, 40 mg, intravenous, PRN, Kimmie Waters MD MPH    prochlorperazine (Compazine) injection 10 mg, 10 mg, intravenous, q6h PRN, Kimmie Waters MD MPH    prochlorperazine (Compazine) tablet 10 mg, 10 mg, oral, q6h PRN, Kimmie Waters MD MPH    sodium chloride 0.9 % bolus 1,000 mL, 1,000 mL, intravenous, Once, Kimmie Waters MD MPH    sodium chloride 0.9 % bolus 500 mL, 500 mL, intravenous, PRN, Kimmie Waters MD MPH    Pain Assessment:  Pain is: 0    Performance Status (ECOG): 2  ECOG Definition  0 Fully active; no performance restrictions.  1 Strenuous physical activity  restricted; fully ambulatory and able to carry out light work.  2 Capable of all self-care but unable to carry out any work activities. Up and about >50% of waking hours.  3 Capable of only limited self-care; confined to bed or chair >50% of waking hours.  4 Completely disabled; cannot carry out any self-care; totally confined to bed or chair.  Exam:    Vital Signs:  /72 (BP Location: Right arm, Patient Position: Sitting, BP Cuff Size: Small adult)   Pulse 91   Temp 36.4 °C (97.5 °F) (Temporal)   Resp 18   Wt (!) 43.2 kg (95 lb 3.8 oz)   SpO2 94%   BMI 17.53 kg/m²     Wt Readings from Last 5 Encounters:   25 (!) 43.2 kg (95 lb 3.8 oz)   25 (!) 43.2 kg (95 lb 2.1 oz)   25 (!) 44.8 kg (98 lb 12.3 oz)   25 (!) 43 kg (94 lb 12.8 oz)   25 (!) 43 kg (94 lb 12.8 oz)       Physical Exam:  ECO  Pain: 0  Constitutional: Well developed, awake/alert/oriented x3, no distress, alert and cooperative  Eyes: PER. sclera anicteric  ENMT: Oral mucosa moist, no lesions or thrush identified  Respiratory/Thorax: Breathing is non-labored. Lungs are clear to auscultation bilaterally. No adventitious breath sounds  Cardiovascular: S1-S2. Regular rate and rhythm. No murmurs, rubs, or gallops appreciated  Gastrointestinal: Abdomen soft nontender, nondistended, normal active bowel sounds.  Musculoskeletal: ROM intact, no joint swelling, normal strength  Extremities: normal extremities, no cyanosis, no edema, no clubbing  Lymphatics: no palpable lymphadenopathy   Skin: no rash  Neurologic: alert and oriented x3. Nonfocal exam. No myoclonus  Psychological: Pleasant, appropriate and easily engaged     Labs:  Lab Results   Component Value Date    WBC 7.8 2025    HGB 11.7 (L) 2025    HCT 35.8 (L) 2025    MCV 97 2025     2025      Lab Results   Component Value Date    NEUTROABS 6.22 2025      Lab Results   Component Value Date    GLUCOSE 77 2025    CALCIUM  8.8 2025     2025    K 4.0 2025    CO2 35 (H) 2025    CL 99 2025    BUN 17 2025    CREATININE 0.46 (L) 2025    MG 2.02 2025     Lab Results   Component Value Date    ALT 18 2025    AST 15 2025    ALKPHOS 29 (L) 2025    BILITOT 0.4 2025      Lab Results   Component Value Date    TSH 4.09 (H) 2025    FREET4 1.06 2025            Assessment/Plan:   69 y.o. female with past medical history as stated referred for management of supraglottic squamous cell carcinoma.    The patient's records and imaging have been reviewed in detail.  I have discussed with the patient the natural history of their disease at length.  The following has also been discussed with the patient:    # Cancer:   L sided supraglottic squamous cell carcinoma ([id number]). Given that she is not surgically resectable. I thus recommend treatment of weekly cisplatin with concurrent RT for 7 weeks. Side effects of chenotherapy including but not limited to nausea, vomiting, diarrhea, anemia, thrombocytopenia,  leukopenia, neutropenic fever, kidney toxicities, liver toxicities, rash, infusion related reaction, secondary malignancies MDS or AML, and fertility impact, neuropathy and hearing impact. Consent signed. To start next week.     - Interval Imagin2025: PET/CT: 1.  Focal intense hypermetabolic activity at the left aryepiglottic fold compatible with biopsy-proven squamous cell carcinoma. 2. No evidence of hypermetabolic kevin or distant metastatic disease.3. Mild hypermetabolic activity associated with the right upper lobe pulmonary nodule. Continued attention on follow-up chest CT is recommended.    # Pulmonary nodule: will continue to follow.    # Clinical Trials:  None currently.     # Access: Peripheral veins. Will likely require Mediport.     # Pain: No acute pain.    # Bone Health: No signs of bony disease.     # Psychosocial: Coping well, no acute issues.   Good support from family & friends.  Social work support offered.  **OKAY to discuss treatment plans with sister.     # Hearing: NO baseline hearing issues.    # Speech and swallow: Order for PEG Tube placement   Has been sent to GI (will not put PEG in), General Surgery (consult appt April 8) and [facility] (no longer places PEGs)  Call out to IR at [facility] and social work will assist in transportation.   4/3/2025: G-tube was placed at [facility]    # GI/CINV: severe malnutrition. Nutrition following.    # Smoking: N/A, current smoker, working on quitting.    # Fertility: N/A.  Oncofertility support available as needed.      # Heme/ESAs: N/A.    #JONES: Has home oxygen ([employer]). Portable concentrator ordered. Oxygen 93% RA at rest. 88% walking short distance with assistance. 97% at rest on 2L via NC.     # GOC: Patient is aware of curative intent goals of care.    # Language: English.    # Interdisciplinary Patient/Family Education Record:  Learner:  Patient.  Learning Needs Reviewed:  Treatments, Medications, Disease Process, Diagnostic Tests.  Barriers: None.  Teaching Method: Discussion, Handout(s).  Comprehension:  Verbalized Understanding.  Follow-up Plan:  Return to office as per MD.    # Dispo:   RTC in 1 week   Continue chemoRT - weekly Cisplatin.  Hydration weekly on Thursdays     Michael Fregoso, RADHA-CNP  [facility]  Thoracic + H&N Medical Oncology     I spent a total of 35+ minutes on the date of the service which included preparing to see the patient, face-to-face patient care, completing clinical documentation, obtaining and / or reviewing separately obtained history, counseling and educating the patient/family/caregiver, ordering medications, tests, or procedures, communicating with other healthcare providers (not separately reported), independently interpreting results, not separately reported, and communicating results to the patient/family/caregiver, Name and date of birth verified.

## 2025-04-07 NOTE — PROGRESS NOTES
NUTRITION Follow Up NOTE    Nutrition Assessment     Reason for Visit:  Ingris Mckinney is a 69 y.o. female who presents for primary SCC supraglottis  Pt in for radiation consult, asked to see 2/2 dx, weight status, weight loss    TX: Cisplatin with concurrent radiation  Patient lives in [place] alone, Zulema, sister, lives in [place], other sister in [facility]. Sisters help pt with buying groceries etc    Contact Zulema Mckinney [phone]    Her insurance company provides transportation    PEG placed 4/3/25  16 F legacy tube    4/7- FUV in infusion.   When asked, Ingris stated she has been flushing her feeding tube since I showed her how to on 4/4  Flushing once daily with 60 ml  Michael WOMACK looked at PEG site  Reviewed cleaning of PEG site with her, using fresh gauze as needed  Discussed how to do a bolus feed, When she receives enteral supplies gravity bags ordered. Pt could use Boost VHC via PEG, recommend diluting.   Explained if at anytime before supplies arrive she feels she cannot eat / eat enough to begin using feeding tube with Boost VHC she has at home and bolus feeds    [facility], awaiting additional documentation for enteral feeds to be approved. Dr Rivas and Michael WOMACK aware     Patient Active Problem List   Diagnosis    Acute on chronic respiratory failure with hypoxia and hypercapnia    Anxiety    Balance disorder    Blood loss anemia    Closed fracture of pelvis (Multi)    Closed fracture of right inferior pubic ramus    Degenerative joint disease (DJD) of hip    Depression    Essential hypertension    ETOH abuse    Hyperlipidemia    Hypoxemia    Centrilobular emphysema (Multi)    COPD with exacerbation (Multi)    Severe protein-calorie malnutrition (Multi)    Lesion of larynx    Primary squamous cell carcinoma of supraglottis       Nutrition Significant Labs:  Lab Results   Component Value Date/Time    GLUCOSE 77 04/04/2025 1326     04/04/2025 1326    K 4.0 04/04/2025 1326    CL 99 04/04/2025 1326     "CO2 35 (H) 04/04/2025 1326    ANIONGAP 8 (L) 04/04/2025 1326    BUN 17 04/04/2025 1326    CREATININE 0.46 (L) 04/04/2025 1326    EGFR >90 04/04/2025 1326    CALCIUM 8.8 04/04/2025 1326    ALBUMIN 3.7 04/04/2025 1326    ALKPHOS 29 (L) 04/04/2025 1326    PROT 5.9 (L) 04/04/2025 1326    AST 15 04/04/2025 1326    BILITOT 0.4 04/04/2025 1326    ALT 18 04/04/2025 1326    MG 2.02 04/04/2025 1326     No results found for: \"VITD25\"      Anthropometrics:               IBW/kg (Dietitian Calculated): 47.7 kg         Daily Weight  04/07/25 : (!) 43.2 kg (95 lb 3.8 oz)  04/07/25 : (!) 43.2 kg (95 lb 2.1 oz)  04/04/25 : (!) 44.8 kg (98 lb 12.3 oz)  03/31/25 : (!) 43 kg (94 lb 12.8 oz)  03/31/25 : (!) 43 kg (94 lb 12.8 oz)  03/27/25 : 46.1 kg (101 lb 10.1 oz)  03/24/25 : (!) 42.8 kg (94 lb 7.5 oz)  03/24/25 : (!) 42.8 kg (94 lb 7.5 oz)  03/20/25 : (!) 43.5 kg (95 lb 14.4 oz)  03/20/25 : (!) 43.5 kg (95 lb 12.6 oz)  03/18/25 : (!) 40.9 kg (90 lb 0.9 oz)  03/17/25 : (!) 42.2 kg (93 lb 0.6 oz)  03/13/25 : (!) 40.9 kg (90 lb 2.7 oz)  03/10/25 : (!) 41.7 kg (92 lb 0.7 oz)  03/05/25 : (!) 35.4 kg (78 lb)  02/10/25 : (!) 35.6 kg (78 lb 7.7 oz)  02/10/25 : (!) 35.6 kg (78 lb 7.7 oz)  01/24/25 : (!) 38.2 kg (84 lb 3.2 oz)  01/13/25 : (!) 39 kg (86 lb)  12/30/24 : (!) 39.2 kg (86 lb 6.4 oz)     3/20- radonc wt 43.4kg / 95#  4/4- OTV wt 44.8 kg / 98#    Weight Change %:  Weight History / % Weight Change: weight stable 94-95#    Nutrition History:  Food & Nutrition History:    Food Allergies:    Food Intolerances:    Vitamin/mineral intake:       Herbal supplements:    Medication and Complementary/Alternative Medicine Use:    Dentition:    Sleep Habits:      Diet Recall:  Meal 1:    Snack 1:    Meal 2:    Snack 2:    Meal 3:    Snack 3: ice cream sometimes  Food Variety:    Oral Nutrition Supplement Use: Oral Nutrition Supplements: Boost Very High Calorie Frequency: doing better at taking 2/day Calories: 530 each Protein: 22 each  Fluid " Intake:    Energy Intake: Good > 75 %    Food Preparation:  Cooking: Patient, Family  Grocery Shopping: Family  Dining Out:      Medications:  Current Outpatient Medications   Medication Instructions    albuterol 90 mcg/actuation inhaler 2 puffs, Every 4 hours PRN    albuterol 2.5 mg, Every 4 hours PRN    alendronate (FOSAMAX) 70 mg, oral, Every 7 days, Take in the morning with a full glass of water, on an empty stomach, and do not take anything else by mouth or lie down for the next 30 min.    dexAMETHasone (DECADRON) 8 mg, oral, Daily, For 3 days per week starting the day after treatment.    Dulera 200-5 mcg/actuation inhaler 2 puffs, 2 times daily    OLANZapine (ZYPREXA) 5 mg, oral, Nightly    ondansetron (ZOFRAN) 8 mg, oral, Every 8 hours PRN    prochlorperazine (COMPAZINE) 10 mg, oral, Every 6 hours PRN    rosuvastatin (CRESTOR) 10 mg, oral, Daily    umeclidinium (Incruse Ellipta) 62.5 mcg/actuation inhalation 1 puff, Daily RT       Nutrition Focused Physical Exam Findings: 4/7/25    Subcutaneous Fat Loss:   Orbital Fat Pads: Severe (dark circles, hollowing and loose skin)  Buccal Fat Pads: Severe (hollow, sunken and narrow face)  Triceps: Severe (negligible fat tissue)  Ribs: Severe (depression between the ribs very apparent, iliac crest very prominent)    Muscle Wasting:  Temporalis: Severe (hollowed scooping depression)  Pectoralis (Clavicular Region): Severe (protruding prominent clavicle)  Deltoid/Trapezius: Severe (squared shoulders, acromion process prominent)  Interosseous: Defer  Trapezius/Infraspinatus/Supraspinatus (Scapular Region): Severe (prominent visual scapula, depression between ribs, scapula or shoulder)  Quadriceps: Severe (depressions on inner and outer thigh)  Gastrocnemius: Severe (minimal muscle definition)    Physical Findings:  Hair: Negative  Eyes: Negative  Nails: Negative  Skin: Negative  Digestive System Findings: Constipation (BM yesterday, taking Miralax as needed / will try  every other day)  Mouth Findings:  (s/s rinses, probiotic lozenges. Denies any changes, taste is OK)       Estimated Needs:            Total Energy Estimated Needs in 24 hours (kCal): 1250 kCal  Energy Estimated Needs per kg Body Weight in 24 hours (kCal/kg): 35 kCal/kg (actual BW used for assessment weight)  Total Protein Estimated Needs in 24 Hours (g): 53 g  Protein Estimated Needs per kg Body Weight in 24 Hours (g/kg): 1.5 g/kg  Total Fluid Estimated Needs in 24 Hours (mL): 1250 mL  Method for Estimating 24 Hour Fluid Needs: 1 calorie/kg                       Nutrition Diagnosis   Malnutrition Diagnosis  Patient has Malnutrition Diagnosis: Yes  Diagnosis Status: Active  Malnutrition Diagnosis: Severe malnutrition related to chronic disease or condition  Related to: 9% weight loss over 1 month, poor oral intake, findings on NFPE (severe depletion fat and muscle stores)    Nutrition Diagnosis  Patient has Nutrition Diagnosis: Yes  Diagnosis Status (1): Active  Nutrition Diagnosis 1: Predicted inadequate energy intake  Related to (1): pathophsyiology of disease and treatment  As Evidenced by (1): anticipated nutrition impact symptoms affecting oral intake and weight  Additional Nutrition Diagnosis: Diagnosis 3  Diagnosis Status (2): Active  Nutrition Diagnosis 2: Increased energy expenditure  Related to (2): increased metabolic demand, disease process  As Evidenced by (2): cancer diagnosis, planned treatment  Diagnosis Status (3): Active  Nutrition Diagnosis 3: Disordered eating pattern  Related to (3): patients meal habits  As Evidenced by (3): pt not eating, caring for self, will not prepare food  Additional Assessment Information (3): 4/7- improved overall,  she is eating one meal /day consistently, taking ONS though needs ongoing reminders to maintain intake       Nutrition Interventions/Recommendations   Nutrition Prescription: Oral nutrition soft/puree/liquid diet, high calorie high protein, management of  NIS    Nutrition Interventions:   Food and Nutrient Delivery: Meals & Snacks: Energy-modified diet, Fluid-modified diet, Modification of schedule of oral intake, Protein-modified diet, Texture-Modified Diet  Goals: Praised positive changes to eating habits. Emphasized every bite counts, have snacks like ice cream or PNB when she can.  Avoid dry harsh foods and acidic foods/beverages. Adequate fluid intake. Continue to reinforce importance of nutrition, oral diet, energy needs encouraging oral intake. Stressed 2 Boost VHC + frozen meal and anything else she can eat - pt just gets lazy and doesn't eat or forgets to eat - but is trying to do better  Enteral Nutrition:  (Plan is for pt to get PEG.  when indicated recommend Isosource 1.5 (3.5) cartons per day to provide 1312 calories, 60 grams protein, 669 ml free water)  Medical Food Supplement: Commercial beverage medical food supplement therapy  Goals: Continue Boost [facility] BID  Feeding Assistance Management: Mouth care management  Goals: as directed  Other:: fluid  Goals: suggested using PEG for additional water/ hydration as needed. Explained she can flush 120 ml once daily and additional water flushes if she is not drinking enough orally     Coordination of Care: Collaboration and referral of nutrition care:  (collaboration with other providers)  Goals: work with team to achieve best possible outcomes     DME: [employer]  Boost VHC BID  Provides 1060 calories and 44 grams protein  Comments:  Enteral orders sent 4/3/25 Isosource 1.5 3.5 cartons per day to provide 1312 calories, 60 grams protein 669 ml free water  Pt [facility] O2 [facility]    Nutrition Education:   Nutrition Education Content:            Nutrition Monitoring and Evaluation   Food and Nutrient Intake  Monitoring and Evaluation Plan: Energy intake, Meal/snack pattern, Protein intake, Fluid intake, Amount of food  Energy Intake: Estimated energy intake  Criteria: Meet >75% estimated energy needs-  food recall  Fluid Intake: Estimated fluid intake  Criteria: maintain hydration  Amount of Food: Medical food intake  Criteria: ONS as recommeded  Meal/Snack Pattern: Estimated meal and snack pattern  Criteria: 2-4 small meals/snacks per day  Estimated protein intake: Estimated protein intake  Criteria: meet >75% estimated protein needs - food recall    Anthropometric measurements  Monitoring and Evaluation Plan: Weight  Body Weight: Measured body weight  Criteria: maintain/gain weight         Nutrition Focused Physical Findings  Monitoring and Evaluation Plan: Adipose, Muscle  Adipose Finding: Loss of subcutaneous fat  Criteria: prevent further losses  Muscle Finding: Muscle atrophy  Criteria: prevent further losses         Follow Up: through treatment      Time Spent  Prep time on day of patient encounter: 5 minutes  Time spent directly with patient, family or caregiver: 20 minutes  Additional Time Spent on Patient Care Activities: 0 minutes  Documentation Time: 20 minutes  Other Time Spent: 0 minutes  Total: 45 minutes

## 2025-04-07 NOTE — PROGRESS NOTES
Patient arrived to infusion via wheelchair. Pt states that she still feels like her throat is swollen on the outside but otherwise has no complaints. RADHA Stanford-CNP over to see patient today. Okay to proceed today.  Pt tolerated treatment well. Pt did complain of pain at her peg tube site and was given tylenol per order. PIV removed. Site bandaged. Pt declined AVS. Pt sent over to radiation via wheelchair.

## 2025-04-08 ENCOUNTER — APPOINTMENT (OUTPATIENT)
Dept: GASTROENTEROLOGY | Facility: HOSPITAL | Age: 70
End: 2025-04-08
Payer: MEDICARE

## 2025-04-08 ENCOUNTER — HOSPITAL ENCOUNTER (OUTPATIENT)
Dept: RADIATION ONCOLOGY | Facility: CLINIC | Age: 70
Setting detail: RADIATION/ONCOLOGY SERIES
Discharge: HOME | End: 2025-04-08
Payer: MEDICARE

## 2025-04-08 ENCOUNTER — APPOINTMENT (OUTPATIENT)
Dept: RADIATION ONCOLOGY | Facility: CLINIC | Age: 70
End: 2025-04-08
Payer: MEDICARE

## 2025-04-08 ENCOUNTER — APPOINTMENT (OUTPATIENT)
Dept: SURGERY | Facility: CLINIC | Age: 70
End: 2025-04-08
Payer: MEDICARE

## 2025-04-08 DIAGNOSIS — Z51.0 ENCOUNTER FOR ANTINEOPLASTIC RADIATION THERAPY: ICD-10-CM

## 2025-04-08 DIAGNOSIS — C32.8 MALIGNANT NEOPLASM OF OVERLAPPING SITES OF LARYNX (MULTI): ICD-10-CM

## 2025-04-08 LAB
RAD ONC MSQ ACTUAL FRACTIONS DELIVERED: 15
RAD ONC MSQ ACTUAL SESSION DELIVERED DOSE: 200 CGRAY
RAD ONC MSQ ACTUAL TOTAL DOSE: 3000 CGRAY
RAD ONC MSQ ELAPSED DAYS: 21
RAD ONC MSQ LAST DATE: NORMAL
RAD ONC MSQ PRESCRIBED FRACTIONAL DOSE: 200 CGRAY
RAD ONC MSQ PRESCRIBED NUMBER OF FRACTIONS: 35
RAD ONC MSQ PRESCRIBED TECHNIQUE: NORMAL
RAD ONC MSQ PRESCRIBED TOTAL DOSE: 7000 CGRAY
RAD ONC MSQ PRESCRIPTION PATTERN COMMENT: NORMAL
RAD ONC MSQ START DATE: NORMAL
RAD ONC MSQ TREATMENT COURSE NUMBER: 1
RAD ONC MSQ TREATMENT SITE: NORMAL

## 2025-04-08 PROCEDURE — 77386 HC INTENSITY-MODULATED RADIATION THERAPY (IMRT), COMPLEX: CPT | Performed by: STUDENT IN AN ORGANIZED HEALTH CARE EDUCATION/TRAINING PROGRAM

## 2025-04-09 ENCOUNTER — HOSPITAL ENCOUNTER (OUTPATIENT)
Dept: RADIATION ONCOLOGY | Facility: CLINIC | Age: 70
Setting detail: RADIATION/ONCOLOGY SERIES
Discharge: HOME | End: 2025-04-09
Payer: MEDICARE

## 2025-04-09 ENCOUNTER — APPOINTMENT (OUTPATIENT)
Dept: RADIATION ONCOLOGY | Facility: CLINIC | Age: 70
End: 2025-04-09
Payer: MEDICARE

## 2025-04-09 DIAGNOSIS — C32.8 MALIGNANT NEOPLASM OF OVERLAPPING SITES OF LARYNX (MULTI): ICD-10-CM

## 2025-04-09 DIAGNOSIS — Z51.0 ENCOUNTER FOR ANTINEOPLASTIC RADIATION THERAPY: ICD-10-CM

## 2025-04-09 LAB
RAD ONC MSQ ACTUAL FRACTIONS DELIVERED: 16
RAD ONC MSQ ACTUAL SESSION DELIVERED DOSE: 200 CGRAY
RAD ONC MSQ ACTUAL TOTAL DOSE: 3200 CGRAY
RAD ONC MSQ ELAPSED DAYS: 22
RAD ONC MSQ LAST DATE: NORMAL
RAD ONC MSQ PRESCRIBED FRACTIONAL DOSE: 200 CGRAY
RAD ONC MSQ PRESCRIBED NUMBER OF FRACTIONS: 35
RAD ONC MSQ PRESCRIBED TECHNIQUE: NORMAL
RAD ONC MSQ PRESCRIBED TOTAL DOSE: 7000 CGRAY
RAD ONC MSQ PRESCRIPTION PATTERN COMMENT: NORMAL
RAD ONC MSQ START DATE: NORMAL
RAD ONC MSQ TREATMENT COURSE NUMBER: 1
RAD ONC MSQ TREATMENT SITE: NORMAL

## 2025-04-09 PROCEDURE — 77386 HC INTENSITY-MODULATED RADIATION THERAPY (IMRT), COMPLEX: CPT | Performed by: STUDENT IN AN ORGANIZED HEALTH CARE EDUCATION/TRAINING PROGRAM

## 2025-04-10 ENCOUNTER — APPOINTMENT (OUTPATIENT)
Dept: RADIATION ONCOLOGY | Facility: CLINIC | Age: 70
End: 2025-04-10
Payer: MEDICARE

## 2025-04-10 ENCOUNTER — INFUSION (OUTPATIENT)
Dept: HEMATOLOGY/ONCOLOGY | Facility: CLINIC | Age: 70
End: 2025-04-10
Payer: MEDICARE

## 2025-04-10 ENCOUNTER — HOSPITAL ENCOUNTER (OUTPATIENT)
Dept: RADIATION ONCOLOGY | Facility: CLINIC | Age: 70
Setting detail: RADIATION/ONCOLOGY SERIES
Discharge: HOME | End: 2025-04-10
Payer: MEDICARE

## 2025-04-10 ENCOUNTER — TELEPHONE (OUTPATIENT)
Dept: CARDIOLOGY | Facility: HOSPITAL | Age: 70
End: 2025-04-10

## 2025-04-10 VITALS
HEART RATE: 95 BPM | RESPIRATION RATE: 18 BRPM | TEMPERATURE: 95.9 F | WEIGHT: 97.99 LBS | DIASTOLIC BLOOD PRESSURE: 60 MMHG | SYSTOLIC BLOOD PRESSURE: 99 MMHG | BODY MASS INDEX: 18.03 KG/M2 | OXYGEN SATURATION: 99 %

## 2025-04-10 DIAGNOSIS — C32.8 MALIGNANT NEOPLASM OF OVERLAPPING SITES OF LARYNX (MULTI): ICD-10-CM

## 2025-04-10 DIAGNOSIS — Z51.0 ENCOUNTER FOR ANTINEOPLASTIC RADIATION THERAPY: ICD-10-CM

## 2025-04-10 DIAGNOSIS — C32.1 PRIMARY SQUAMOUS CELL CARCINOMA OF SUPRAGLOTTIS: ICD-10-CM

## 2025-04-10 LAB
RAD ONC MSQ ACTUAL FRACTIONS DELIVERED: 17
RAD ONC MSQ ACTUAL SESSION DELIVERED DOSE: 200 CGRAY
RAD ONC MSQ ACTUAL TOTAL DOSE: 3400 CGRAY
RAD ONC MSQ ELAPSED DAYS: 23
RAD ONC MSQ LAST DATE: NORMAL
RAD ONC MSQ PRESCRIBED FRACTIONAL DOSE: 200 CGRAY
RAD ONC MSQ PRESCRIBED NUMBER OF FRACTIONS: 35
RAD ONC MSQ PRESCRIBED TECHNIQUE: NORMAL
RAD ONC MSQ PRESCRIBED TOTAL DOSE: 7000 CGRAY
RAD ONC MSQ PRESCRIPTION PATTERN COMMENT: NORMAL
RAD ONC MSQ START DATE: NORMAL
RAD ONC MSQ TREATMENT COURSE NUMBER: 1
RAD ONC MSQ TREATMENT SITE: NORMAL

## 2025-04-10 PROCEDURE — 96360 HYDRATION IV INFUSION INIT: CPT | Mod: INF

## 2025-04-10 PROCEDURE — 2500000004 HC RX 250 GENERAL PHARMACY W/ HCPCS (ALT 636 FOR OP/ED): Performed by: STUDENT IN AN ORGANIZED HEALTH CARE EDUCATION/TRAINING PROGRAM

## 2025-04-10 PROCEDURE — 77386 HC INTENSITY-MODULATED RADIATION THERAPY (IMRT), COMPLEX: CPT | Performed by: STUDENT IN AN ORGANIZED HEALTH CARE EDUCATION/TRAINING PROGRAM

## 2025-04-10 RX ADMIN — SODIUM CHLORIDE 1000 ML: 9 INJECTION, SOLUTION INTRAVENOUS at 13:15

## 2025-04-10 ASSESSMENT — ENCOUNTER SYMPTOMS
LOSS OF SENSATION IN FEET: 0
DEPRESSION: 0
OCCASIONAL FEELINGS OF UNSTEADINESS: 0

## 2025-04-10 ASSESSMENT — PAIN SCALES - GENERAL: PAINLEVEL_OUTOF10: 0-NO PAIN

## 2025-04-10 NOTE — PROGRESS NOTES
Radiation Oncology On Treatment Visit    Patient Name:  Ingris Mckinney  MRN:  [id number]  :  1955    Referring Provider: No ref. provider found  Primary Care Provider: Marge Camacho MD  Care Team: Patient Care Team:  Marge Camacho MD as PCP - General (Internal Medicine)  Ngoc Selby MD as PCP - [employer] PCP  Noemy Rivas DO as Radiation Oncologist (Radiation Oncology)  Tamiko Mahoney RN as Registered Nurse (Radiation Therapy)  Daphney Loo RN as Nurse Navigator (Hematology and Oncology)  Kimmie Waters MD MPH as Consulting Physician (Hematology and Oncology)  RADHA Mendoza-CNP as Nurse Practitioner (Hematology and Oncology)    Date of Service: 4/10/2025     Diagnosis:   Specialty Problems          Radiation Oncology Problems    Primary squamous cell carcinoma of supraglottis         Treatment Summary:  Radiation Therapy    Treatment Period Technique Fraction Dose Fractions Total Dose   Course 1 3/18/2025-4/10/2025  (days elapsed: 23)         H&N 3/18/2025-4/10/2025 VMAT 200 / 200 cGy  3400 / 7,000 cGy     SUBJECTIVE: Continues to tolerate PO diet and has regained some weight. Not using feeding tube yet. Denies any significant dysphagia or odynophagia. Continues to have decreased appetite however. Notes her swelling has improved overall.       OBJECTIVE:   Vital Signs:  BP 99/60   Pulse 95   Temp 35.5 °C (95.9 °F) (Temporal)   Resp 18   Wt (!) 44.4 kg (97 lb 15.9 oz)   SpO2 99%   BMI 18.03 kg/m²     Daily Weight  04/10/25 : (!) 44.4 kg (97 lb 15.9 oz)  25 : (!) 43.2 kg (95 lb 3.8 oz)  25 : (!) 43.2 kg (95 lb 2.1 oz)  25 : (!) 44.8 kg (98 lb 12.3 oz)  25 : (!) 43 kg (94 lb 12.8 oz)  25 : (!) 43 kg (94 lb 12.8 oz)  25 : 46.1 kg (101 lb 10.1 oz)  25 : (!) 42.8 kg (94 lb 7.5 oz)  25 : (!) 42.8 kg (94 lb 7.5 oz)  25 : (!) 43.5 kg (95 lb 14.4 oz)     Other Pertinent Findings:     Toxicity Assessment           3/20/2025    10:44 3/20/2025    10:46 3/27/2025    11:14 4/4/2025    13:06 4/10/2025    12:45   Toxicity Assessment   Treatment  and neck  Head and neck Head and neck Head and neck   Anorexia Grade 0       eating regular food plus 2 anna calorie Boosts/day Grade 0       drinking 2 high calorie Boosts/day and eating regular foods Grade 0       Drinking 2 high protein shakes/day and eating what she wants Grade 1       Eating soft foods and drinking 1-2 high calorie boosts/day. Patient just had Peg placed yesterday Grade 0       drinking 2 boost plus eating   Anxiety  Grade 1 Grade 0 Grade 0 Grade 0   Dehydration  Grade 0 Grade 0 Grade 0 Grade 0   Depression  Grade 0 Grade 0 Grade 0 Grade 0   Dermatitis Radiation  Grade 0 Grade 0 Grade 0 Grade 1       some redness   Diarrhea  Grade 1       with anxiety Grade 0 Grade 0 Grade 0   Fatigue  Grade 1 Grade 1       naps during the day Grade 1       naps during the day Grade 1       taking naps, going to bed early   Fibrosis Deep Connective Tissue     Grade 0   Fracture     Grade 0   Nausea  Grade 0 Grade 0 Grade 0 Grade 0   Pain  Grade 0 Grade 0 Grade 2       pain level 8 to abdomen--from Peg placement Grade 0   Treatment Related Secondary Malignancy     Grade 0   Tumor Pain     Grade 0   Vomiting  Grade 0 Grade 0 Grade 0 Grade 0   Dysphagia  Grade 0 Grade 0 Grade 0 Grade 0   Esophagitis  Grade 0 Grade 0 Grade 1 Grade 0   Mucositis Oral  Grade 0       using salt and soda rinse and Blis lozenges Grade 0 Grade 0 Grade 0       using baking soda and salt rinse, blis lozenges   Hearing Impaired  Grade 0 Grade 0 Grade 0 Grade 0   Blurred Vision  Grade 0 Grade 0 Grade 0 Grade 0   Dry Eye  Grade 0 Grade 0 Grade 0 Grade 0   Eye Pain  Grade 0 Grade 0 Grade 0 Grade 0   Retinopathy     Grade 0   Dry Mouth  Grade 0 Grade 1 Grade 0 Grade 1   Pneumonitis     Grade 0   Ear Pain  Grade 0 Grade 0 Grade 0 Grade 0   External Ear Pain  Grade 0 Grade 0 Grade 0 Grade 0   Tinnitus  Grade 0  Grade 0 Grade 0 Grade 0   Watering Eyes     Grade 0   Oral Pain  Grade 0 Grade 0 Grade 0 Grade 0   Salivary Duct Inflammation   Grade 0 Grade 0 Grade 0   Edema Face  Grade 0 Grade 0  Grade 0   Malaise     Grade 0   Neck Edema   Grade 0 Grade 1       new. started early this am Grade 0   Head Soft Tissue Necrosis     Grade 0   Neck Soft Tissue Necrosis     Grade 0   Osteonecrosis of Jaw     Grade 0   Superficial Soft Tissue Fibrosis     Grade 0   Trismus  Grade 0 Grade 0 Grade 0 Grade 0   Dysarthria  Grade 0 Grade 0 Grade 0 Grade 0   Dysesthesia  Grade 0 Grade 0 Grade 0 Grade 0   Dysgeusia  Grade 0 Grade 0 Grade 0 Grade 0   Facial Nerve Disorder     Grade 0   Hypoglossal Nerve Disorder     Grade 0   Oculomotor Nerve Disorder     Grade 0   Paresthesia     Grade 0   Stroke     Grade 0   Transient Ischemic Attacks     Grade 0   Trigeminal Nerve Disorder     Grade 0   Aspiration  Grade 0 Grade 0 Grade 0 Grade 0   Hoarseness  Grade 2 Grade 1       baseline Grade 3 Grade 3   Laryngeal Edema     Grade 0   Stridor     Grade 0   Tracheal Mucositis     Grade 0   Voice Alteration  Grade 2 Grade 1 Grade 2 Grade 2        Assessment / Plan:  The patient is tolerating radiation therapy as anticipated.  Continue per current treatment plan.

## 2025-04-11 ENCOUNTER — ANESTHESIA (OUTPATIENT)
Dept: CARDIOLOGY | Facility: HOSPITAL | Age: 70
End: 2025-04-11
Payer: MEDICARE

## 2025-04-11 ENCOUNTER — HOSPITAL ENCOUNTER (OUTPATIENT)
Dept: RADIATION ONCOLOGY | Facility: CLINIC | Age: 70
Setting detail: RADIATION/ONCOLOGY SERIES
Discharge: HOME | End: 2025-04-11
Payer: MEDICARE

## 2025-04-11 ENCOUNTER — HOSPITAL ENCOUNTER (OUTPATIENT)
Dept: CARDIOLOGY | Facility: HOSPITAL | Age: 70
Discharge: HOME | End: 2025-04-11
Payer: MEDICARE

## 2025-04-11 ENCOUNTER — APPOINTMENT (OUTPATIENT)
Dept: RADIATION ONCOLOGY | Facility: CLINIC | Age: 70
End: 2025-04-11
Payer: MEDICARE

## 2025-04-11 ENCOUNTER — ANESTHESIA EVENT (OUTPATIENT)
Dept: CARDIOLOGY | Facility: HOSPITAL | Age: 70
End: 2025-04-11
Payer: MEDICARE

## 2025-04-11 VITALS
HEART RATE: 89 BPM | DIASTOLIC BLOOD PRESSURE: 77 MMHG | RESPIRATION RATE: 16 BRPM | SYSTOLIC BLOOD PRESSURE: 165 MMHG | OXYGEN SATURATION: 92 % | TEMPERATURE: 98 F

## 2025-04-11 DIAGNOSIS — Z51.0 ENCOUNTER FOR ANTINEOPLASTIC RADIATION THERAPY: ICD-10-CM

## 2025-04-11 DIAGNOSIS — E43 SEVERE PROTEIN-CALORIE MALNUTRITION (MULTI): ICD-10-CM

## 2025-04-11 DIAGNOSIS — Z09 S/P GASTROSTOMY TUBE (G TUBE) PLACEMENT, FOLLOW-UP EXAM: ICD-10-CM

## 2025-04-11 DIAGNOSIS — C32.8 MALIGNANT NEOPLASM OF OVERLAPPING SITES OF LARYNX (MULTI): ICD-10-CM

## 2025-04-11 LAB
RAD ONC MSQ ACTUAL FRACTIONS DELIVERED: 18
RAD ONC MSQ ACTUAL SESSION DELIVERED DOSE: 200 CGRAY
RAD ONC MSQ ACTUAL TOTAL DOSE: 3600 CGRAY
RAD ONC MSQ ELAPSED DAYS: 24
RAD ONC MSQ LAST DATE: NORMAL
RAD ONC MSQ PRESCRIBED FRACTIONAL DOSE: 200 CGRAY
RAD ONC MSQ PRESCRIBED NUMBER OF FRACTIONS: 35
RAD ONC MSQ PRESCRIBED TECHNIQUE: NORMAL
RAD ONC MSQ PRESCRIBED TOTAL DOSE: 7000 CGRAY
RAD ONC MSQ PRESCRIPTION PATTERN COMMENT: NORMAL
RAD ONC MSQ START DATE: NORMAL
RAD ONC MSQ TREATMENT COURSE NUMBER: 1
RAD ONC MSQ TREATMENT SITE: NORMAL

## 2025-04-11 PROCEDURE — 77386 HC INTENSITY-MODULATED RADIATION THERAPY (IMRT), COMPLEX: CPT | Performed by: STUDENT IN AN ORGANIZED HEALTH CARE EDUCATION/TRAINING PROGRAM

## 2025-04-11 PROCEDURE — 99024 POSTOP FOLLOW-UP VISIT: CPT | Performed by: NURSE PRACTITIONER

## 2025-04-11 ASSESSMENT — PAIN - FUNCTIONAL ASSESSMENT: PAIN_FUNCTIONAL_ASSESSMENT: 0-10

## 2025-04-11 ASSESSMENT — PAIN SCALES - GENERAL: PAINLEVEL_OUTOF10: 2

## 2025-04-11 NOTE — TREATMENT PLAN
Patient seen today for eval of her G tube.   Site appears benign. She notes it flushes without difficulty.     T-Tacks were removed without difficulty.   Gauze dressing applied. Patient advised to call with any questions or concerns.

## 2025-04-14 ENCOUNTER — OFFICE VISIT (OUTPATIENT)
Dept: HEMATOLOGY/ONCOLOGY | Facility: CLINIC | Age: 70
End: 2025-04-14
Payer: MEDICARE

## 2025-04-14 ENCOUNTER — INFUSION (OUTPATIENT)
Dept: HEMATOLOGY/ONCOLOGY | Facility: CLINIC | Age: 70
End: 2025-04-14
Payer: MEDICARE

## 2025-04-14 ENCOUNTER — HOSPITAL ENCOUNTER (OUTPATIENT)
Dept: RADIATION ONCOLOGY | Facility: CLINIC | Age: 70
Setting detail: RADIATION/ONCOLOGY SERIES
Discharge: HOME | End: 2025-04-14
Payer: MEDICARE

## 2025-04-14 ENCOUNTER — NUTRITION (OUTPATIENT)
Dept: HEMATOLOGY/ONCOLOGY | Facility: CLINIC | Age: 70
End: 2025-04-14

## 2025-04-14 ENCOUNTER — APPOINTMENT (OUTPATIENT)
Dept: RADIATION ONCOLOGY | Facility: CLINIC | Age: 70
End: 2025-04-14
Payer: MEDICARE

## 2025-04-14 VITALS
DIASTOLIC BLOOD PRESSURE: 60 MMHG | BODY MASS INDEX: 16.74 KG/M2 | TEMPERATURE: 97 F | SYSTOLIC BLOOD PRESSURE: 93 MMHG | OXYGEN SATURATION: 92 % | RESPIRATION RATE: 18 BRPM | WEIGHT: 90.94 LBS | HEART RATE: 105 BPM

## 2025-04-14 VITALS
TEMPERATURE: 97 F | DIASTOLIC BLOOD PRESSURE: 64 MMHG | RESPIRATION RATE: 18 BRPM | BODY MASS INDEX: 16.74 KG/M2 | SYSTOLIC BLOOD PRESSURE: 130 MMHG | OXYGEN SATURATION: 92 % | WEIGHT: 90.94 LBS | HEART RATE: 105 BPM

## 2025-04-14 DIAGNOSIS — Z51.0 ENCOUNTER FOR ANTINEOPLASTIC RADIATION THERAPY: ICD-10-CM

## 2025-04-14 DIAGNOSIS — C32.1 PRIMARY SQUAMOUS CELL CARCINOMA OF SUPRAGLOTTIS: ICD-10-CM

## 2025-04-14 DIAGNOSIS — C32.8 MALIGNANT NEOPLASM OF OVERLAPPING SITES OF LARYNX (MULTI): ICD-10-CM

## 2025-04-14 LAB
ALBUMIN SERPL BCP-MCNC: 4 G/DL (ref 3.4–5)
ALP SERPL-CCNC: 41 U/L (ref 33–136)
ALT SERPL W P-5'-P-CCNC: 19 U/L (ref 7–45)
ANION GAP SERPL CALC-SCNC: 10 MMOL/L (ref 10–20)
AST SERPL W P-5'-P-CCNC: 20 U/L (ref 9–39)
BASOPHILS # BLD AUTO: 0.01 X10*3/UL (ref 0–0.1)
BASOPHILS NFR BLD AUTO: 0.3 %
BILIRUB SERPL-MCNC: 0.2 MG/DL (ref 0–1.2)
BUN SERPL-MCNC: 14 MG/DL (ref 6–23)
CALCIUM SERPL-MCNC: 9.2 MG/DL (ref 8.6–10.3)
CHLORIDE SERPL-SCNC: 102 MMOL/L (ref 98–107)
CO2 SERPL-SCNC: 35 MMOL/L (ref 21–32)
CREAT SERPL-MCNC: 0.5 MG/DL (ref 0.5–1.05)
EGFRCR SERPLBLD CKD-EPI 2021: >90 ML/MIN/1.73M*2
EOSINOPHIL # BLD AUTO: 0.05 X10*3/UL (ref 0–0.7)
EOSINOPHIL NFR BLD AUTO: 1.3 %
ERYTHROCYTE [DISTWIDTH] IN BLOOD BY AUTOMATED COUNT: 12.8 % (ref 11.5–14.5)
GLUCOSE SERPL-MCNC: 158 MG/DL (ref 74–99)
HCT VFR BLD AUTO: 33.6 % (ref 36–46)
HGB BLD-MCNC: 11 G/DL (ref 12–16)
IMM GRANULOCYTES # BLD AUTO: 0 X10*3/UL (ref 0–0.7)
IMM GRANULOCYTES NFR BLD AUTO: 0 % (ref 0–0.9)
LYMPHOCYTES # BLD AUTO: 0.41 X10*3/UL (ref 1.2–4.8)
LYMPHOCYTES NFR BLD AUTO: 10.5 %
MAGNESIUM SERPL-MCNC: 1.73 MG/DL (ref 1.6–2.4)
MCH RBC QN AUTO: 31.8 PG (ref 26–34)
MCHC RBC AUTO-ENTMCNC: 32.7 G/DL (ref 32–36)
MCV RBC AUTO: 97 FL (ref 80–100)
MONOCYTES # BLD AUTO: 0.17 X10*3/UL (ref 0.1–1)
MONOCYTES NFR BLD AUTO: 4.4 %
NEUTROPHILS # BLD AUTO: 3.26 X10*3/UL (ref 1.2–7.7)
NEUTROPHILS NFR BLD AUTO: 83.5 %
NRBC BLD-RTO: 0 /100 WBCS (ref 0–0)
PLATELET # BLD AUTO: 127 X10*3/UL (ref 150–450)
POTASSIUM SERPL-SCNC: 4 MMOL/L (ref 3.5–5.3)
PROT SERPL-MCNC: 6.6 G/DL (ref 6.4–8.2)
RAD ONC MSQ ACTUAL FRACTIONS DELIVERED: 19
RAD ONC MSQ ACTUAL SESSION DELIVERED DOSE: 200 CGRAY
RAD ONC MSQ ACTUAL TOTAL DOSE: 3800 CGRAY
RAD ONC MSQ ELAPSED DAYS: 27
RAD ONC MSQ LAST DATE: NORMAL
RAD ONC MSQ PRESCRIBED FRACTIONAL DOSE: 200 CGRAY
RAD ONC MSQ PRESCRIBED NUMBER OF FRACTIONS: 35
RAD ONC MSQ PRESCRIBED TECHNIQUE: NORMAL
RAD ONC MSQ PRESCRIBED TOTAL DOSE: 7000 CGRAY
RAD ONC MSQ PRESCRIPTION PATTERN COMMENT: NORMAL
RAD ONC MSQ START DATE: NORMAL
RAD ONC MSQ TREATMENT COURSE NUMBER: 1
RAD ONC MSQ TREATMENT SITE: NORMAL
RBC # BLD AUTO: 3.46 X10*6/UL (ref 4–5.2)
SODIUM SERPL-SCNC: 143 MMOL/L (ref 136–145)
WBC # BLD AUTO: 3.9 X10*3/UL (ref 4.4–11.3)

## 2025-04-14 PROCEDURE — 3078F DIAST BP <80 MM HG: CPT | Performed by: NURSE PRACTITIONER

## 2025-04-14 PROCEDURE — 85025 COMPLETE CBC W/AUTO DIFF WBC: CPT

## 2025-04-14 PROCEDURE — 77386 HC INTENSITY-MODULATED RADIATION THERAPY (IMRT), COMPLEX: CPT | Performed by: STUDENT IN AN ORGANIZED HEALTH CARE EDUCATION/TRAINING PROGRAM

## 2025-04-14 PROCEDURE — 99214 OFFICE O/P EST MOD 30 MIN: CPT | Performed by: NURSE PRACTITIONER

## 2025-04-14 PROCEDURE — 96413 CHEMO IV INFUSION 1 HR: CPT

## 2025-04-14 PROCEDURE — 80053 COMPREHEN METABOLIC PANEL: CPT

## 2025-04-14 PROCEDURE — 2500000004 HC RX 250 GENERAL PHARMACY W/ HCPCS (ALT 636 FOR OP/ED): Performed by: STUDENT IN AN ORGANIZED HEALTH CARE EDUCATION/TRAINING PROGRAM

## 2025-04-14 PROCEDURE — 96361 HYDRATE IV INFUSION ADD-ON: CPT | Mod: INF

## 2025-04-14 PROCEDURE — 96367 TX/PROPH/DG ADDL SEQ IV INF: CPT

## 2025-04-14 PROCEDURE — 99214 OFFICE O/P EST MOD 30 MIN: CPT | Mod: 25 | Performed by: NURSE PRACTITIONER

## 2025-04-14 PROCEDURE — 83735 ASSAY OF MAGNESIUM: CPT

## 2025-04-14 PROCEDURE — 1126F AMNT PAIN NOTED NONE PRSNT: CPT | Performed by: NURSE PRACTITIONER

## 2025-04-14 PROCEDURE — 3074F SYST BP LT 130 MM HG: CPT | Performed by: NURSE PRACTITIONER

## 2025-04-14 PROCEDURE — 1160F RVW MEDS BY RX/DR IN RCRD: CPT | Performed by: NURSE PRACTITIONER

## 2025-04-14 PROCEDURE — 96375 TX/PRO/DX INJ NEW DRUG ADDON: CPT | Mod: INF

## 2025-04-14 PROCEDURE — 1159F MED LIST DOCD IN RCRD: CPT | Performed by: NURSE PRACTITIONER

## 2025-04-14 RX ORDER — EPINEPHRINE 0.3 MG/.3ML
0.3 INJECTION SUBCUTANEOUS EVERY 5 MIN PRN
Status: DISCONTINUED | OUTPATIENT
Start: 2025-04-14 | End: 2025-04-14 | Stop reason: HOSPADM

## 2025-04-14 RX ORDER — PROCHLORPERAZINE EDISYLATE 5 MG/ML
10 INJECTION INTRAMUSCULAR; INTRAVENOUS EVERY 6 HOURS PRN
Status: DISCONTINUED | OUTPATIENT
Start: 2025-04-14 | End: 2025-04-14 | Stop reason: HOSPADM

## 2025-04-14 RX ORDER — PALONOSETRON 0.05 MG/ML
0.25 INJECTION, SOLUTION INTRAVENOUS ONCE
Status: COMPLETED | OUTPATIENT
Start: 2025-04-14 | End: 2025-04-14

## 2025-04-14 RX ORDER — FAMOTIDINE 10 MG/ML
20 INJECTION, SOLUTION INTRAVENOUS ONCE AS NEEDED
Status: DISCONTINUED | OUTPATIENT
Start: 2025-04-14 | End: 2025-04-14 | Stop reason: HOSPADM

## 2025-04-14 RX ORDER — PROCHLORPERAZINE MALEATE 10 MG
10 TABLET ORAL EVERY 6 HOURS PRN
Status: DISCONTINUED | OUTPATIENT
Start: 2025-04-14 | End: 2025-04-14 | Stop reason: HOSPADM

## 2025-04-14 RX ORDER — ALBUTEROL SULFATE 0.83 MG/ML
3 SOLUTION RESPIRATORY (INHALATION) AS NEEDED
Status: DISCONTINUED | OUTPATIENT
Start: 2025-04-14 | End: 2025-04-14 | Stop reason: HOSPADM

## 2025-04-14 RX ORDER — DIPHENHYDRAMINE HYDROCHLORIDE 50 MG/ML
50 INJECTION, SOLUTION INTRAMUSCULAR; INTRAVENOUS AS NEEDED
Status: DISCONTINUED | OUTPATIENT
Start: 2025-04-14 | End: 2025-04-14 | Stop reason: HOSPADM

## 2025-04-14 RX ORDER — DEXAMETHASONE 6 MG/1
12 TABLET ORAL ONCE
Status: COMPLETED | OUTPATIENT
Start: 2025-04-14 | End: 2025-04-14

## 2025-04-14 RX ADMIN — PALONOSETRON HYDROCHLORIDE 0.25 MG: 0.25 INJECTION INTRAVENOUS at 09:51

## 2025-04-14 RX ADMIN — MAGNESIUM SULFATE HEPTAHYDRATE 999 ML/HR: 500 INJECTION, SOLUTION INTRAMUSCULAR; INTRAVENOUS at 08:50

## 2025-04-14 RX ADMIN — DEXAMETHASONE 12 MG: 6 TABLET ORAL at 09:53

## 2025-04-14 RX ADMIN — FOSAPREPITANT 150 MG: 150 INJECTION, POWDER, LYOPHILIZED, FOR SOLUTION INTRAVENOUS at 09:52

## 2025-04-14 RX ADMIN — SODIUM CHLORIDE 54 MG: 9 INJECTION, SOLUTION INTRAVENOUS at 10:29

## 2025-04-14 RX ADMIN — SODIUM CHLORIDE 1000 ML: 9 INJECTION, SOLUTION INTRAVENOUS at 11:45

## 2025-04-14 ASSESSMENT — PAIN SCALES - GENERAL
PAINLEVEL_OUTOF10: 0-NO PAIN
PAINLEVEL_OUTOF10: 0-NO PAIN

## 2025-04-14 NOTE — PROGRESS NOTES
Subjective:   Patient Name: Ingris Mckinney    : 1955     MRN: [id number]     Age: 69 y.o.     Gender: female  Referring Provider: ARNAV    CC: Management of newly diagnosed L sided supraglottic squamous cell carcinoma ([id number])    Presenting History (3/13/2025): At time of initial presentation a 69 y.o. female, current smoker (started at age 19,1/3 pack per day) with a past medical history of osteoporosis, HLD, COPD (occasionally on O2) referred for management of suprglottic cancer.      She has been following with ENT for history of vocal cord paralysis without CT abnormality since , which was successfully managed with injection medialization.     She then developed acute worsening of her voice in 2024, with scope exam shortly after that showed mucosal irregularity along the anterior left false cord. The left true vocal cord was fixed in the paramedian position. The right true vocal cord exhibited normal movement. She was taken to the operating room for direct and biopsy on 2025. Operative findings showed a left false cord mass lesion which was not obstructive, and extended from anterior to posterior. The mass extended to the laryngeal ventricle on the left, but spared the true vocal cord. The mass did not appear to cross to the right. The vallecula, base of tongue and hypopharynx were normal. Biopsies from the left supraglottic mass were positive for invasive moderately differentiated squamous cell carcinoma.     CT neck on 2025 showed an enhancing mass in the left supraglottic larynx, 1.0 x 1.7 x 1.4 cm in size, which approached the epiglottis without definitive invasion. There was an area of irregularity/thickening on the right true vocal cord of unclear etiology.     2025: PET/CT: 1.  Focal intense hypermetabolic activity at the left aryepiglottic fold compatible with biopsy-proven squamous cell carcinoma. 2. No evidence of hypermetabolic kevin or distant metastatic disease.3.  Mild hypermetabolic activity associated with the right upper lobe pulmonary nodule. Continued attention on follow-up chest CT is recommended.    She was seen by ENT Dr. Lo and unfortunately was deemed not surgically resectable. She presented to [facility] for a consultation accompanied by her brother in law. She lives on her own. She states that she has been lazy over the past months and has lost a lot of weight. She doesn't really eat much food other than cereals. Luckily her sister and in-law brought her dinner. She has gained 12 lb in the past weeks. She denies any dysphagia or odynophagia. NO pain. NO numbness or tingling. No heating issues. No fever or chills. No n/v/d/c    Social Hx:   No etoh or illicit drugs.   2 sisters   No kids      Treatment Summary:  - March 18, 2025 C1D1 Cisplatin - weekly with concurrent RT     Current Treatment:  - March 18, 2025 C1D1 Cisplatin 40mg/m2 - weekly with concurrent RT   - March 25, 2025 C1D8 Cisplatin 40mg/m2  - March 31, 2025 C1D15 Cisplatin 40mg/m2  - April 7, 2025 C1D22 Cisplatin 40mg/m2  - April 14, 2025 C1D29 Cisplatin 40mg/m2    Interval History (4/14/2025):    Patient presents today for toxicity check and treatment readiness visit. She is tolerating Cisplatin very well. She is alone at today's visit. She denies any fevers, chills or night sweats. + cough - chronic COPD, no chest pain, JONES interferes with ADLs. On home oxygen has portable oxygen concentrator. No nausea or vomiting. Increased throat pain. + dysphagia. Now eating for pleasure only. Have encouraged hydration via g-tube. Weight loss noted, will need to start tube feeds for nutrition at this time. No constipation or diarrhea. No urinary symptoms. No rash. No neuropathy. Denies hearing changes or tinnitus. No further facial swelling.      G-tube placed 4/3/2025    Review of Systems:  A review of systems has been completed and are negative for complaints except what is stated in the HPI and/or past medical  history      Medical History:   Past Medical History:   Diagnosis Date    Anxiety     At risk for falling     COPD (chronic obstructive pulmonary disease) (Multi)     Hypercholesteremia     Hypoxia     Larynx cancer (Multi)     Malnutrition (Multi)     Personal history of other diseases of the respiratory system     History of chronic obstructive lung disease    Respiratory failure (Multi)     Tachycardia     Weakness        Surgical History:   Past Surgical History:   Procedure Laterality Date    CT GUIDED IMAGING FOR NEEDLE PLACEMENT  05/05/2020    CT GUIDED IMAGING FOR NEEDLE PLACEMENT [facility]    LARYNGOSCOPY      direct w/ bx    OTHER SURGICAL HISTORY  11/11/2022    Hip surgery    OTHER SURGICAL HISTORY  11/11/2022    Arm surgery    OTHER SURGICAL HISTORY  11/11/2022    Leg surgery       Family History:  Family History   Problem Relation Name Age of Onset    Other (chronic lung disease) Other         Allergies:  Allergies   Allergen Reactions    Scallops Anaphylaxis    Iodinated Contrast Media Itching       Meds (Current):    Current Outpatient Medications:     albuterol 2.5 mg /3 mL (0.083 %) nebulizer solution, Take 3 mL (2.5 mg) by nebulization every 4 hours if needed for wheezing., Disp: , Rfl:     albuterol 90 mcg/actuation inhaler, Inhale 2 puffs every 4 hours if needed., Disp: , Rfl:     alendronate (Fosamax) 70 mg tablet, Take 1 tablet (70 mg) by mouth every 7 days. Take in the morning with a full glass of water, on an empty stomach, and do not take anything else by mouth or lie down for the next 30 min., Disp: 12 tablet, Rfl: 3    dexAMETHasone (Decadron) 4 mg tablet, Take 2 tablets (8 mg) by mouth once daily. For 3 days per week starting the day after treatment., Disp: 42 tablet, Rfl: 0    Dulera 200-5 mcg/actuation inhaler, Inhale 2 puffs twice a day., Disp: , Rfl:     OLANZapine (ZyPREXA) 5 mg tablet, Take 1 tablet (5 mg) by mouth once daily at bedtime., Disp: 30 tablet, Rfl: 1     ondansetron (Zofran) 8 mg tablet, Take 1 tablet (8 mg) by mouth every 8 hours if needed for nausea or vomiting., Disp: 30 tablet, Rfl: 5    prochlorperazine (Compazine) 10 mg tablet, Take 1 tablet (10 mg) by mouth every 6 hours if needed for nausea or vomiting., Disp: 30 tablet, Rfl: 5    rosuvastatin (Crestor) 10 mg tablet, Take 1 tablet (10 mg) by mouth once daily., Disp: 90 tablet, Rfl: 2    umeclidinium (Incruse Ellipta) 62.5 mcg/actuation inhalation, Inhale 1 puff (62.5 mcg) once daily., Disp: , Rfl:       Pain Assessment:  Pain is: 0    Performance Status (ECOG): 2  ECOG Definition  0 Fully active; no performance restrictions.  1 Strenuous physical activity restricted; fully ambulatory and able to carry out light work.  2 Capable of all self-care but unable to carry out any work activities. Up and about >50% of waking hours.  3 Capable of only limited self-care; confined to bed or chair >50% of waking hours.  4 Completely disabled; cannot carry out any self-care; totally confined to bed or chair.  Exam:    Vital Signs:  BP 93/60 (BP Location: Right arm, Patient Position: Sitting)   Pulse 105   Temp 36.1 °C (97 °F) (Temporal)   Resp 18   Wt (!) 41.3 kg (90 lb 15 oz)   SpO2 92%   BMI 16.74 kg/m²     Wt Readings from Last 5 Encounters:   25 (!) 41.3 kg (90 lb 15 oz)   25 (!) 41.3 kg (90 lb 15 oz)   04/10/25 (!) 44.4 kg (97 lb 15.9 oz)   25 (!) 43.2 kg (95 lb 3.8 oz)   25 (!) 43.2 kg (95 lb 2.1 oz)         Physical Exam:  ECO  Pain: 1  Constitutional: Well developed, awake/alert/oriented x3, no distress, alert and cooperative  Eyes: PER. sclera anicteric  ENMT: Oral mucosa moist, no lesions or thrush identified  Respiratory/Thorax: Breathing is non-labored. Lungs are clear to auscultation bilaterally. No adventitious breath sounds  Cardiovascular: S1-S2. Regular rate and rhythm. No murmurs, rubs, or gallops appreciated  Gastrointestinal: Abdomen soft nontender, nondistended,  normal active bowel sounds.  Musculoskeletal: ROM intact, no joint swelling, normal strength  Extremities: normal extremities, no cyanosis, no edema, no clubbing  Lymphatics: no palpable lymphadenopathy   Skin: no rash  Neurologic: alert and oriented x3. Nonfocal exam. No myoclonus  Psychological: Pleasant, appropriate and easily engaged     Labs:  Lab Results   Component Value Date    WBC 3.9 (L) 04/14/2025    HGB 11.0 (L) 04/14/2025    HCT 33.6 (L) 04/14/2025    MCV 97 04/14/2025     (L) 04/14/2025      Lab Results   Component Value Date    NEUTROABS 3.26 04/14/2025      Lab Results   Component Value Date    GLUCOSE 158 (H) 04/14/2025    CALCIUM 9.2 04/14/2025     04/14/2025    K 4.0 04/14/2025    CO2 35 (H) 04/14/2025     04/14/2025    BUN 14 04/14/2025    CREATININE 0.50 04/14/2025    MG 1.73 04/14/2025     Lab Results   Component Value Date    ALT 19 04/14/2025    AST 20 04/14/2025    ALKPHOS 41 04/14/2025    BILITOT 0.2 04/14/2025      Lab Results   Component Value Date    TSH 4.09 (H) 03/17/2025    FREET4 1.06 03/17/2025            Assessment/Plan:   69 y.o. female with past medical history as stated referred for management of supraglottic squamous cell carcinoma.    The patient's records and imaging have been reviewed in detail.  I have discussed with the patient the natural history of their disease at length.  The following has also been discussed with the patient:    # Cancer:   L sided supraglottic squamous cell carcinoma ([id number]). Given that she is not surgically resectable. I thus recommend treatment of weekly cisplatin with concurrent RT for 7 weeks. Side effects of chenotherapy including but not limited to nausea, vomiting, diarrhea, anemia, thrombocytopenia,  leukopenia, neutropenic fever, kidney toxicities, liver toxicities, rash, infusion related reaction, secondary malignancies MDS or AML, and fertility impact, neuropathy and hearing impact. Consent signed. To start next  week.     - Interval Imagin2025: PET/CT: 1.  Focal intense hypermetabolic activity at the left aryepiglottic fold compatible with biopsy-proven squamous cell carcinoma. 2. No evidence of hypermetabolic kevin or distant metastatic disease.3. Mild hypermetabolic activity associated with the right upper lobe pulmonary nodule. Continued attention on follow-up chest CT is recommended.    # Pulmonary nodule: will continue to follow.    # Clinical Trials:  None currently.     # Access: Peripheral veins. Will likely require Mediport.     # Pain: No acute pain.    # Bone Health: No signs of bony disease.     # Psychosocial: Coping well, no acute issues.  Good support from family & friends.  Social work support offered.  **OKAY to discuss treatment plans with sister.     # Hearing: NO baseline hearing issues.    # Speech and swallow: Order for PEG Tube placement   Has been sent to GI (will not put PEG in), General Surgery (consult appt ) and [facility] (no longer places PEGs)  Call out to IR at [facility] and social work will assist in transportation.   4/3/2025: G-tube was placed at [facility]  2025: Weight loss identified this week. Increased dysphagia and pain. Will need to start tube feeds. Soft food for pleasure.    # GI/CINV: severe malnutrition. Nutrition following.    # Smoking: N/A, current smoker, working on quitting.    # Fertility: N/A.  Oncofertility support available as needed.      # Heme/ESAs: N/A.    #JONES: Has home oxygen ([facility]). Portable concentrator ordered. Oxygen 93% RA at rest. 88% walking short distance with assistance. 97% at rest on 2L via NC.     # GOC: Patient is aware of curative intent goals of care.    # Language: English.    # Interdisciplinary Patient/Family Education Record:  Learner:  Patient.  Learning Needs Reviewed:  Treatments, Medications, Disease Process, Diagnostic Tests.  Barriers: None.  Teaching Method: Discussion, Handout(s).  Comprehension:  Verbalized  Understanding.  Follow-up Plan:  Return to office as per MD.    # Dispo:   RTC in 1 week   Continue chemoRT - weekly Cisplatin.  Hydration weekly on Thursdays     Michael Fregoso, APRN-CNP  [facility]  Thoracic + H&N Medical Oncology     I spent a total of 35+ minutes on the date of the service which included preparing to see the patient, face-to-face patient care, completing clinical documentation, obtaining and / or reviewing separately obtained history, counseling and educating the patient/family/caregiver, ordering medications, tests, or procedures, communicating with other healthcare providers (not separately reported), independently interpreting results, not separately reported, and communicating results to the patient/family/caregiver, Name and date of birth verified.

## 2025-04-14 NOTE — PROGRESS NOTES
"NUTRITION Follow Up NOTE    Nutrition Assessment     Reason for Visit:  Ingris Mckinney is a 69 y.o. female who presents for primary SCC supraglottis  Pt in for radiation consult, asked to see 2/2 dx, weight status, weight loss    TX: Cisplatin with concurrent radiation  Patient lives in [place] alone, Zulema, sister, lives in [place], other sister in [facility]. Sisters help pt with buying groceries etc    Contact Zulema Mckinney [phone]    Her insurance company provides transportation    PEG placed 4/3/25  16 F legacy tube    4/14- Week 4.  FUV in infusion. Updated by Michael BROWN Pt lost weight, has not been compliant with oral intake, oral shakes. Noted to be depressed. Sister will be heading back to [place] this week, other sister on [facility]     Patient Active Problem List   Diagnosis    Acute on chronic respiratory failure with hypoxia and hypercapnia    Anxiety    Balance disorder    Blood loss anemia    Closed fracture of pelvis (Multi)    Closed fracture of right inferior pubic ramus    Degenerative joint disease (DJD) of hip    Depression    Essential hypertension    ETOH abuse    Hyperlipidemia    Hypoxemia    Centrilobular emphysema (Multi)    COPD with exacerbation (Multi)    Severe protein-calorie malnutrition (Multi)    Lesion of larynx    Primary squamous cell carcinoma of supraglottis       Nutrition Significant Labs:  Lab Results   Component Value Date/Time    GLUCOSE 158 (H) 04/14/2025 0737     04/14/2025 0737    K 4.0 04/14/2025 0737     04/14/2025 0737    CO2 35 (H) 04/14/2025 0737    ANIONGAP 10 04/14/2025 0737    BUN 14 04/14/2025 0737    CREATININE 0.50 04/14/2025 0737    EGFR >90 04/14/2025 0737    CALCIUM 9.2 04/14/2025 0737    ALBUMIN 4.0 04/14/2025 0737    ALKPHOS 41 04/14/2025 0737    PROT 6.6 04/14/2025 0737    AST 20 04/14/2025 0737    BILITOT 0.2 04/14/2025 0737    ALT 19 04/14/2025 0737    MG 1.73 04/14/2025 0737     No results found for: \"VITD25\"      Anthropometrics:       "         IBW/kg (Dietitian Calculated): 47.7 kg         Daily Weight  04/14/25 : (!) 41.3 kg (90 lb 15 oz)  04/14/25 : (!) 41.3 kg (90 lb 15 oz)  04/10/25 : (!) 44.4 kg (97 lb 15.9 oz)  04/07/25 : (!) 43.2 kg (95 lb 3.8 oz)  04/07/25 : (!) 43.2 kg (95 lb 2.1 oz)  04/04/25 : (!) 44.8 kg (98 lb 12.3 oz)  03/31/25 : (!) 43 kg (94 lb 12.8 oz)  03/31/25 : (!) 43 kg (94 lb 12.8 oz)  03/27/25 : 46.1 kg (101 lb 10.1 oz)  03/24/25 : (!) 42.8 kg (94 lb 7.5 oz)  03/24/25 : (!) 42.8 kg (94 lb 7.5 oz)  03/20/25 : (!) 43.5 kg (95 lb 14.4 oz)  03/20/25 : (!) 43.5 kg (95 lb 12.6 oz)  03/18/25 : (!) 40.9 kg (90 lb 0.9 oz)  03/17/25 : (!) 42.2 kg (93 lb 0.6 oz)  03/13/25 : (!) 40.9 kg (90 lb 2.7 oz)  03/10/25 : (!) 41.7 kg (92 lb 0.7 oz)  03/05/25 : (!) 35.4 kg (78 lb)  02/10/25 : (!) 35.6 kg (78 lb 7.7 oz)  02/10/25 : (!) 35.6 kg (78 lb 7.7 oz)     3/20- radonc wt 43.4kg / 95#  4/4- OTV wt 44.8 kg / 98#    Weight Change %:  Weight History / % Weight Change: Weight dropped over past week, 5%  Significant Weight Loss: Yes  Interpretation of Weight Loss: >2% in 1 week    Nutrition History:  Food & Nutrition History:    Food Allergies:    Food Intolerances:    Vitamin/mineral intake:       Herbal supplements:    Medication and Complementary/Alternative Medicine Use:    Dentition:    Sleep Habits:      Diet Recall:  Meal 1:    Snack 1:    Meal 2:    Snack 2:    Meal 3:    Snack 3:    Food Variety:    Oral Nutrition Supplement Use: Oral Nutrition Supplements: Boost Very High Calorie Frequency: taking inconsistently Calories: 530 each Protein: 22 each  Fluid Intake:    Energy Intake: Fair 50-75 %    Food Preparation:  Cooking: Patient, Family  Grocery Shopping: Family  Dining Out:      Medications:  Current Outpatient Medications   Medication Instructions    albuterol 90 mcg/actuation inhaler 2 puffs, Every 4 hours PRN    albuterol 2.5 mg, Every 4 hours PRN    alendronate (FOSAMAX) 70 mg, oral, Every 7 days, Take in the morning with a full  "glass of water, on an empty stomach, and do not take anything else by mouth or lie down for the next 30 min.    dexAMETHasone (DECADRON) 8 mg, oral, Daily, For 3 days per week starting the day after treatment.    Dulera 200-5 mcg/actuation inhaler 2 puffs, 2 times daily    OLANZapine (ZYPREXA) 5 mg, oral, Nightly    ondansetron (ZOFRAN) 8 mg, oral, Every 8 hours PRN    prochlorperazine (COMPAZINE) 10 mg, oral, Every 6 hours PRN    rosuvastatin (CRESTOR) 10 mg, oral, Daily    umeclidinium (Incruse Ellipta) 62.5 mcg/actuation inhalation 1 puff, Daily RT       Nutrition Focused Physical Exam Findings: 4/7/25    Subcutaneous Fat Loss:        Muscle Wasting:       Physical Findings:  Digestive System Findings: Constipation (intermittent - Miralax when needed)  Mouth Findings:  (not taking probiotic lozenges \"nothing is wrong\" explained they are preventative and encouraged her to take them. Continue with s/s rinse, she is doing 3-4X/day)       Estimated Needs:       Total Energy Estimated Needs in 24 hours (kCal): 1425 kCal  Energy Estimated Needs per kg Body Weight in 24 hours (kCal/kg): 35 kCal/kg (4/14- recalculated based on current weight 90#)  Total Protein Estimated Needs in 24 Hours (g): 61 g  Protein Estimated Needs per kg Body Weight in 24 Hours (g/kg): 1.5 g/kg  Total Fluid Estimated Needs in 24 Hours (mL): 1425 mL  Method for Estimating 24 Hour Fluid Needs: 1 ml/kcal             Nutrition Diagnosis   Malnutrition Diagnosis  Patient has Malnutrition Diagnosis: Yes  Diagnosis Status: Active  Malnutrition Diagnosis: Severe malnutrition related to chronic disease or condition  Related to: 9% weight loss over 1 month, poor oral intake, findings on NFPE (severe depletion fat and muscle stores)    Nutrition Diagnosis  Patient has Nutrition Diagnosis: Yes  Diagnosis Status (1): Active  Nutrition Diagnosis 1: Predicted inadequate energy intake  Related to (1): pathophsyiology of disease and treatment  As Evidenced by " "(1): anticipated nutrition impact symptoms affecting oral intake and weight  Additional Nutrition Diagnosis: Diagnosis 3  Diagnosis Status (2): Active  Nutrition Diagnosis 2: Increased energy expenditure  Related to (2): increased metabolic demand, disease process  As Evidenced by (2): cancer diagnosis, planned treatment  Diagnosis Status (3): Active  Nutrition Diagnosis 3: Disordered eating pattern  Related to (3): patients meal habits  As Evidenced by (3): pt not eating, caring for self, will not prepare food  Additional Assessment Information (3): 4/14 - intake down \"I mostly didn't try\"       Nutrition Interventions/Recommendations   Nutrition Prescription: Oral nutrition soft/puree/liquid diet, high calorie high protein, management of NIS    Nutrition Interventions:   Food and Nutrient Delivery: Meals & Snacks: Energy-modified diet, Fluid-modified diet, Modification of schedule of oral intake, Protein-modified diet, Texture-Modified Diet  Goals: Stressed importance of taking Boost VHC 2/day everyday + a plus supplement and anything she can eat. If she cannot drink Boost VHC orally she can use through PEG, diluted slightly with water. Provided her with new syringe and advised to throw current one away, use and care of syringe reviewed. She is flushing once daily. Encouraged adequate fluids, pt stating I am probably not drinking enough  Enteral Nutrition:  (+ PEG. When indicated Isosource 1.5 (3.5) cartons/day provides 1312 valentina, 60 grams pro, 669 ml free water. She can  use Boost VHC in PEG diluted with water, this was explained to her. Cesia holding onto enteral orders until needed and pt meets criteria)  Medical Food Supplement: Commercial beverage medical food supplement therapy  Goals: Continue Boost VHC BID + 1 plus supplement  Feeding Assistance Management: Mouth care management  Goals: encouraged to be compliant with probiotic lozenges and expalined they are preventative and continue with s/s " rinse  Other:: fluid  Goals: suggested using PEG for additional water/ hydration as needed. Explained she can flush 120 ml once daily and additional water flushes if she is not drinking enough orally     Coordination of Care: Collaboration and referral of nutrition care:  (collaboration with other providers)  Goals: work with team to achieve best possible outcomes     DME: [employer]  Boost VHC BID  Provides 1060 calories and 44 grams protein  Comments   Enteral orders sent 4/3/25 Isosource 1.5 3.5 cartons per day to provide 1312 calories, 60 grams protein 669 ml free water  - brett holding enteral orders for time being  Pt [facility] O2 [facility]      Nutrition Education:   Nutrition Education Content:            Nutrition Monitoring and Evaluation   Food and Nutrient Intake  Monitoring and Evaluation Plan: Energy intake, Meal/snack pattern, Protein intake, Fluid intake, Amount of food  Energy Intake: Estimated energy intake  Criteria: Meet >75% estimated energy needs- food recall  Fluid Intake: Estimated fluid intake  Criteria: maintain hydration  Amount of Food: Medical food intake  Criteria: ONS as recommeded  Meal/Snack Pattern: Estimated meal and snack pattern  Criteria: 2-4 small meals/snacks per day  Estimated protein intake: Estimated protein intake  Criteria: meet >75% estimated protein needs - food recall    Anthropometric measurements  Monitoring and Evaluation Plan: Weight  Body Weight: Measured body weight  Criteria: maintain/gain weight         Nutrition Focused Physical Findings  Monitoring and Evaluation Plan: Adipose, Muscle, Digestive System  Adipose Finding: Loss of subcutaneous fat  Criteria: prevent further losses  Digestive System Finding: Constipation  Criteria: manage symptoms, adhere to regime  Muscle Finding: Muscle atrophy  Criteria: prevent further losses         Follow Up: through treatment      Time Spent  Prep time on day of patient encounter: 5 minutes  Time spent directly  with patient, family or caregiver: 20 minutes  Additional Time Spent on Patient Care Activities: 5 minutes  Documentation Time: 20 minutes  Other Time Spent: 0 minutes  Total: 50 minutes

## 2025-04-15 ENCOUNTER — APPOINTMENT (OUTPATIENT)
Dept: RADIATION ONCOLOGY | Facility: CLINIC | Age: 70
End: 2025-04-15
Payer: MEDICARE

## 2025-04-15 ENCOUNTER — HOSPITAL ENCOUNTER (OUTPATIENT)
Dept: RADIATION ONCOLOGY | Facility: CLINIC | Age: 70
Setting detail: RADIATION/ONCOLOGY SERIES
Discharge: HOME | End: 2025-04-15
Payer: MEDICARE

## 2025-04-15 LAB
RAD ONC MSQ ACTUAL FRACTIONS DELIVERED: 20
RAD ONC MSQ ACTUAL SESSION DELIVERED DOSE: 200 CGRAY
RAD ONC MSQ ACTUAL TOTAL DOSE: 4000 CGRAY
RAD ONC MSQ ELAPSED DAYS: 28
RAD ONC MSQ LAST DATE: NORMAL
RAD ONC MSQ PRESCRIBED FRACTIONAL DOSE: 200 CGRAY
RAD ONC MSQ PRESCRIBED NUMBER OF FRACTIONS: 35
RAD ONC MSQ PRESCRIBED TECHNIQUE: NORMAL
RAD ONC MSQ PRESCRIBED TOTAL DOSE: 7000 CGRAY
RAD ONC MSQ PRESCRIPTION PATTERN COMMENT: NORMAL
RAD ONC MSQ START DATE: NORMAL
RAD ONC MSQ TREATMENT COURSE NUMBER: 1
RAD ONC MSQ TREATMENT SITE: NORMAL

## 2025-04-15 PROCEDURE — 77336 RADIATION PHYSICS CONSULT: CPT | Performed by: STUDENT IN AN ORGANIZED HEALTH CARE EDUCATION/TRAINING PROGRAM

## 2025-04-15 PROCEDURE — 77386 HC INTENSITY-MODULATED RADIATION THERAPY (IMRT), COMPLEX: CPT | Performed by: STUDENT IN AN ORGANIZED HEALTH CARE EDUCATION/TRAINING PROGRAM

## 2025-04-16 ENCOUNTER — APPOINTMENT (OUTPATIENT)
Dept: RADIATION ONCOLOGY | Facility: CLINIC | Age: 70
End: 2025-04-16
Payer: MEDICARE

## 2025-04-16 ENCOUNTER — HOSPITAL ENCOUNTER (OUTPATIENT)
Dept: RADIATION ONCOLOGY | Facility: CLINIC | Age: 70
Setting detail: RADIATION/ONCOLOGY SERIES
Discharge: HOME | End: 2025-04-16
Payer: MEDICARE

## 2025-04-16 DIAGNOSIS — C32.8 MALIGNANT NEOPLASM OF OVERLAPPING SITES OF LARYNX (MULTI): ICD-10-CM

## 2025-04-16 DIAGNOSIS — Z51.0 ENCOUNTER FOR ANTINEOPLASTIC RADIATION THERAPY: ICD-10-CM

## 2025-04-16 LAB
RAD ONC MSQ ACTUAL FRACTIONS DELIVERED: 21
RAD ONC MSQ ACTUAL SESSION DELIVERED DOSE: 200 CGRAY
RAD ONC MSQ ACTUAL TOTAL DOSE: 4200 CGRAY
RAD ONC MSQ ELAPSED DAYS: 29
RAD ONC MSQ LAST DATE: NORMAL
RAD ONC MSQ PRESCRIBED FRACTIONAL DOSE: 200 CGRAY
RAD ONC MSQ PRESCRIBED NUMBER OF FRACTIONS: 35
RAD ONC MSQ PRESCRIBED TECHNIQUE: NORMAL
RAD ONC MSQ PRESCRIBED TOTAL DOSE: 7000 CGRAY
RAD ONC MSQ PRESCRIPTION PATTERN COMMENT: NORMAL
RAD ONC MSQ START DATE: NORMAL
RAD ONC MSQ TREATMENT COURSE NUMBER: 1
RAD ONC MSQ TREATMENT SITE: NORMAL

## 2025-04-16 PROCEDURE — 77386 HC INTENSITY-MODULATED RADIATION THERAPY (IMRT), COMPLEX: CPT | Performed by: STUDENT IN AN ORGANIZED HEALTH CARE EDUCATION/TRAINING PROGRAM

## 2025-04-17 ENCOUNTER — INFUSION (OUTPATIENT)
Dept: HEMATOLOGY/ONCOLOGY | Facility: CLINIC | Age: 70
End: 2025-04-17
Payer: MEDICARE

## 2025-04-17 ENCOUNTER — HOSPITAL ENCOUNTER (OUTPATIENT)
Dept: RADIATION ONCOLOGY | Facility: CLINIC | Age: 70
Setting detail: RADIATION/ONCOLOGY SERIES
Discharge: HOME | End: 2025-04-17
Payer: MEDICARE

## 2025-04-17 ENCOUNTER — APPOINTMENT (OUTPATIENT)
Dept: RADIATION ONCOLOGY | Facility: CLINIC | Age: 70
End: 2025-04-17
Payer: MEDICARE

## 2025-04-17 VITALS
SYSTOLIC BLOOD PRESSURE: 88 MMHG | TEMPERATURE: 96.8 F | RESPIRATION RATE: 18 BRPM | OXYGEN SATURATION: 92 % | DIASTOLIC BLOOD PRESSURE: 58 MMHG | BODY MASS INDEX: 17.02 KG/M2 | WEIGHT: 92.48 LBS | HEART RATE: 103 BPM

## 2025-04-17 VITALS
SYSTOLIC BLOOD PRESSURE: 87 MMHG | HEART RATE: 90 BPM | RESPIRATION RATE: 18 BRPM | WEIGHT: 93.7 LBS | TEMPERATURE: 97.9 F | OXYGEN SATURATION: 96 % | DIASTOLIC BLOOD PRESSURE: 59 MMHG | BODY MASS INDEX: 17.24 KG/M2

## 2025-04-17 DIAGNOSIS — C32.8 MALIGNANT NEOPLASM OF OVERLAPPING SITES OF LARYNX (MULTI): ICD-10-CM

## 2025-04-17 DIAGNOSIS — C32.1 PRIMARY SQUAMOUS CELL CARCINOMA OF SUPRAGLOTTIS: ICD-10-CM

## 2025-04-17 DIAGNOSIS — Z51.0 ENCOUNTER FOR ANTINEOPLASTIC RADIATION THERAPY: ICD-10-CM

## 2025-04-17 LAB
RAD ONC MSQ ACTUAL FRACTIONS DELIVERED: 22
RAD ONC MSQ ACTUAL SESSION DELIVERED DOSE: 200 CGRAY
RAD ONC MSQ ACTUAL TOTAL DOSE: 4400 CGRAY
RAD ONC MSQ ELAPSED DAYS: 30
RAD ONC MSQ LAST DATE: NORMAL
RAD ONC MSQ PRESCRIBED FRACTIONAL DOSE: 200 CGRAY
RAD ONC MSQ PRESCRIBED NUMBER OF FRACTIONS: 35
RAD ONC MSQ PRESCRIBED TECHNIQUE: NORMAL
RAD ONC MSQ PRESCRIBED TOTAL DOSE: 7000 CGRAY
RAD ONC MSQ PRESCRIPTION PATTERN COMMENT: NORMAL
RAD ONC MSQ START DATE: NORMAL
RAD ONC MSQ TREATMENT COURSE NUMBER: 1
RAD ONC MSQ TREATMENT SITE: NORMAL

## 2025-04-17 PROCEDURE — 77386 HC INTENSITY-MODULATED RADIATION THERAPY (IMRT), COMPLEX: CPT | Performed by: STUDENT IN AN ORGANIZED HEALTH CARE EDUCATION/TRAINING PROGRAM

## 2025-04-17 PROCEDURE — 96360 HYDRATION IV INFUSION INIT: CPT | Mod: INF

## 2025-04-17 PROCEDURE — 2500000004 HC RX 250 GENERAL PHARMACY W/ HCPCS (ALT 636 FOR OP/ED): Performed by: STUDENT IN AN ORGANIZED HEALTH CARE EDUCATION/TRAINING PROGRAM

## 2025-04-17 RX ADMIN — SODIUM CHLORIDE 1000 ML: 9 INJECTION, SOLUTION INTRAVENOUS at 13:00

## 2025-04-17 ASSESSMENT — PAIN SCALES - GENERAL
PAINLEVEL_OUTOF10: 0-NO PAIN
PAINLEVEL_OUTOF10: 0-NO PAIN

## 2025-04-17 NOTE — PROGRESS NOTES
Radiation Oncology On Treatment Visit    Patient Name:  Ingris Mckinney  MRN:  [id number]  :  1955    Referring Provider: No ref. provider found  Primary Care Provider: Marge Camacho MD  Care Team: Patient Care Team:  Marge Camacho MD as PCP - General (Internal Medicine)  Ngoc Selby MD as PCP - [employer] PCP  Noemy Rivas DO as Radiation Oncologist (Radiation Oncology)  Tamiko Mahoney RN as Registered Nurse (Radiation Therapy)  Daphney Loo RN as Nurse Navigator (Hematology and Oncology)  Kimmie Waters MD MPH as Consulting Physician (Hematology and Oncology)  RADHA Mendoza-CNP as Nurse Practitioner (Hematology and Oncology)    Date of Service: 2025     Diagnosis:   Specialty Problems          Radiation Oncology Problems    Primary squamous cell carcinoma of supraglottis         Treatment Summary:  Radiation Therapy    Treatment Period Technique Fraction Dose Fractions Total Dose   Course 1 3/18/2025-2025  (days elapsed: 30)         H&N 3/18/2025-2025 VMAT 200 / 200 cGy  4,400 / 7,000 cGy     SUBJECTIVE: Overall stable. Some weight loss from last week, but regained a few pounds. Taking 2 boosts a day in addition to meals. Notes decreased appetite, but denies any significant odynophagia,.      OBJECTIVE:   Vital Signs:  BP 87/59   Pulse 90   Temp 36.6 °C (97.9 °F)   Resp 18   Wt (!) 42.5 kg (93 lb 11.1 oz)   SpO2 96%   BMI 17.24 kg/m²     Daily Weight  25 : (!) 42 kg (92 lb 7.7 oz)  25 : (!) 42.5 kg (93 lb 11.1 oz)  25 : (!) 41.3 kg (90 lb 15 oz)  25 : (!) 41.3 kg (90 lb 15 oz)  04/10/25 : (!) 44.4 kg (97 lb 15.9 oz)  25 : (!) 43.2 kg (95 lb 3.8 oz)  25 : (!) 43.2 kg (95 lb 2.1 oz)  25 : (!) 44.8 kg (98 lb 12.3 oz)  25 : (!) 43 kg (94 lb 12.8 oz)  25 : (!) 43 kg (94 lb 12.8 oz)     Other Pertinent Findings:     Toxicity Assessment          3/20/2025    10:44 3/20/2025    10:46 3/27/2025     11:14 4/4/2025    13:06 4/10/2025    12:45 4/17/2025    12:50   Toxicity Assessment   Treatment  and neck  Head and neck Head and neck Head and neck Head and neck   Anorexia Grade 0       eating regular food plus 2 anna calorie Boosts/day Grade 0       drinking 2 high calorie Boosts/day and eating regular foods Grade 0       Drinking 2 high protein shakes/day and eating what she wants Grade 1       Eating soft foods and drinking 1-2 high calorie boosts/day. Patient just had Peg placed yesterday Grade 0       drinking 2 boost plus eating Grade 1       eating the meals provided and 2 cans of Boost/day   Anxiety  Grade 1 Grade 0 Grade 0 Grade 0 Grade 1   Dehydration  Grade 0 Grade 0 Grade 0 Grade 0 Grade 0   Depression  Grade 0 Grade 0 Grade 0 Grade 0 Grade 0   Dermatitis Radiation  Grade 0 Grade 0 Grade 0 Grade 1       some redness Grade 0   Diarrhea  Grade 1       with anxiety Grade 0 Grade 0 Grade 0 Grade 0   Fatigue  Grade 1 Grade 1       naps during the day Grade 1       naps during the day Grade 1       taking naps, going to bed early Grade 1       naps during the day   Fibrosis Deep Connective Tissue     Grade 0    Fracture     Grade 0    Nausea  Grade 0 Grade 0 Grade 0 Grade 0 Grade 0   Pain  Grade 0 Grade 0 Grade 2       pain level 8 to abdomen--from Peg placement Grade 0 Grade 0   Treatment Related Secondary Malignancy     Grade 0    Tumor Pain     Grade 0    Vomiting  Grade 0 Grade 0 Grade 0 Grade 0 Grade 0   Dysphagia  Grade 0 Grade 0 Grade 0 Grade 0 Grade 0   Esophagitis  Grade 0 Grade 0 Grade 1 Grade 0 Grade 0   Mucositis Oral  Grade 0       using salt and soda rinse and Blis lozenges Grade 0 Grade 0 Grade 0       using baking soda and salt rinse, blis lozenges Grade 0   Hearing Impaired  Grade 0 Grade 0 Grade 0 Grade 0 Grade 0   Blurred Vision  Grade 0 Grade 0 Grade 0 Grade 0 Grade 0   Dry Eye  Grade 0 Grade 0 Grade 0 Grade 0 Grade 0   Eye Pain  Grade 0 Grade 0 Grade 0 Grade 0 Grade 0    Retinopathy     Grade 0    Dry Mouth  Grade 0 Grade 1 Grade 0 Grade 1 Grade 1   Pneumonitis     Grade 0    Ear Pain  Grade 0 Grade 0 Grade 0 Grade 0 Grade 0   External Ear Pain  Grade 0 Grade 0 Grade 0 Grade 0 Grade 0   Tinnitus  Grade 0 Grade 0 Grade 0 Grade 0 Grade 0   Watering Eyes     Grade 0 Grade 0   Oral Pain  Grade 0 Grade 0 Grade 0 Grade 0 Grade 0   Salivary Duct Inflammation   Grade 0 Grade 0 Grade 0 Grade 1   Edema Face  Grade 0 Grade 0  Grade 0 Grade 0   Malaise     Grade 0    Neck Edema   Grade 0 Grade 1       new. started early this am Grade 0 Grade 0   Head Soft Tissue Necrosis     Grade 0    Neck Soft Tissue Necrosis     Grade 0    Osteonecrosis of Jaw     Grade 0    Superficial Soft Tissue Fibrosis     Grade 0    Trismus  Grade 0 Grade 0 Grade 0 Grade 0 Grade 0   Dysarthria  Grade 0 Grade 0 Grade 0 Grade 0 Grade 0   Dysesthesia  Grade 0 Grade 0 Grade 0 Grade 0 Grade 0   Dysgeusia  Grade 0 Grade 0 Grade 0 Grade 0 Grade 1   Facial Nerve Disorder     Grade 0    Hypoglossal Nerve Disorder     Grade 0    Oculomotor Nerve Disorder     Grade 0    Paresthesia     Grade 0    Stroke     Grade 0    Transient Ischemic Attacks     Grade 0    Trigeminal Nerve Disorder     Grade 0    Aspiration  Grade 0 Grade 0 Grade 0 Grade 0 Grade 0   Hoarseness  Grade 2 Grade 1       baseline Grade 3 Grade 3 Grade 3   Laryngeal Edema     Grade 0    Stridor     Grade 0    Tracheal Mucositis     Grade 0    Voice Alteration  Grade 2 Grade 1 Grade 2 Grade 2 Grade 3        Assessment / Plan:  The patient is tolerating radiation therapy as anticipated.  Continue per current treatment plan.

## 2025-04-18 ENCOUNTER — HOSPITAL ENCOUNTER (OUTPATIENT)
Dept: RADIATION ONCOLOGY | Facility: CLINIC | Age: 70
Setting detail: RADIATION/ONCOLOGY SERIES
Discharge: HOME | End: 2025-04-18
Payer: MEDICARE

## 2025-04-18 ENCOUNTER — APPOINTMENT (OUTPATIENT)
Dept: RADIATION ONCOLOGY | Facility: CLINIC | Age: 70
End: 2025-04-18
Payer: MEDICARE

## 2025-04-18 DIAGNOSIS — C32.8 MALIGNANT NEOPLASM OF OVERLAPPING SITES OF LARYNX (MULTI): ICD-10-CM

## 2025-04-18 DIAGNOSIS — Z51.0 ENCOUNTER FOR ANTINEOPLASTIC RADIATION THERAPY: ICD-10-CM

## 2025-04-18 LAB
RAD ONC MSQ ACTUAL FRACTIONS DELIVERED: 23
RAD ONC MSQ ACTUAL SESSION DELIVERED DOSE: 200 CGRAY
RAD ONC MSQ ACTUAL TOTAL DOSE: 4600 CGRAY
RAD ONC MSQ ELAPSED DAYS: 31
RAD ONC MSQ LAST DATE: NORMAL
RAD ONC MSQ PRESCRIBED FRACTIONAL DOSE: 200 CGRAY
RAD ONC MSQ PRESCRIBED NUMBER OF FRACTIONS: 35
RAD ONC MSQ PRESCRIBED TECHNIQUE: NORMAL
RAD ONC MSQ PRESCRIBED TOTAL DOSE: 7000 CGRAY
RAD ONC MSQ PRESCRIPTION PATTERN COMMENT: NORMAL
RAD ONC MSQ START DATE: NORMAL
RAD ONC MSQ TREATMENT COURSE NUMBER: 1
RAD ONC MSQ TREATMENT SITE: NORMAL

## 2025-04-18 PROCEDURE — 77386 HC INTENSITY-MODULATED RADIATION THERAPY (IMRT), COMPLEX: CPT | Performed by: STUDENT IN AN ORGANIZED HEALTH CARE EDUCATION/TRAINING PROGRAM

## 2025-04-21 ENCOUNTER — INFUSION (OUTPATIENT)
Dept: HEMATOLOGY/ONCOLOGY | Facility: CLINIC | Age: 70
End: 2025-04-21
Payer: MEDICARE

## 2025-04-21 ENCOUNTER — NUTRITION (OUTPATIENT)
Dept: HEMATOLOGY/ONCOLOGY | Facility: CLINIC | Age: 70
End: 2025-04-21
Payer: MEDICARE

## 2025-04-21 ENCOUNTER — OFFICE VISIT (OUTPATIENT)
Dept: HEMATOLOGY/ONCOLOGY | Facility: CLINIC | Age: 70
End: 2025-04-21
Payer: MEDICARE

## 2025-04-21 ENCOUNTER — HOSPITAL ENCOUNTER (OUTPATIENT)
Dept: RADIATION ONCOLOGY | Facility: CLINIC | Age: 70
Setting detail: RADIATION/ONCOLOGY SERIES
Discharge: HOME | End: 2025-04-21
Payer: MEDICARE

## 2025-04-21 ENCOUNTER — APPOINTMENT (OUTPATIENT)
Dept: RADIATION ONCOLOGY | Facility: CLINIC | Age: 70
End: 2025-04-21
Payer: MEDICARE

## 2025-04-21 VITALS
OXYGEN SATURATION: 93 % | HEART RATE: 103 BPM | SYSTOLIC BLOOD PRESSURE: 102 MMHG | BODY MASS INDEX: 16.92 KG/M2 | DIASTOLIC BLOOD PRESSURE: 60 MMHG | WEIGHT: 91.93 LBS | RESPIRATION RATE: 18 BRPM | TEMPERATURE: 98.2 F

## 2025-04-21 VITALS
HEART RATE: 103 BPM | OXYGEN SATURATION: 93 % | BODY MASS INDEX: 16.92 KG/M2 | SYSTOLIC BLOOD PRESSURE: 102 MMHG | WEIGHT: 91.93 LBS | RESPIRATION RATE: 18 BRPM | DIASTOLIC BLOOD PRESSURE: 60 MMHG | TEMPERATURE: 98.2 F

## 2025-04-21 DIAGNOSIS — C32.8 MALIGNANT NEOPLASM OF OVERLAPPING SITES OF LARYNX (MULTI): ICD-10-CM

## 2025-04-21 DIAGNOSIS — C32.1 PRIMARY SQUAMOUS CELL CARCINOMA OF SUPRAGLOTTIS: ICD-10-CM

## 2025-04-21 DIAGNOSIS — Z51.0 ENCOUNTER FOR ANTINEOPLASTIC RADIATION THERAPY: ICD-10-CM

## 2025-04-21 LAB
ALBUMIN SERPL BCP-MCNC: 4 G/DL (ref 3.4–5)
ALP SERPL-CCNC: 40 U/L (ref 33–136)
ALT SERPL W P-5'-P-CCNC: 15 U/L (ref 7–45)
ANION GAP SERPL CALC-SCNC: 10 MMOL/L (ref 10–20)
AST SERPL W P-5'-P-CCNC: 18 U/L (ref 9–39)
BASOPHILS # BLD MANUAL: 0 X10*3/UL (ref 0–0.1)
BASOPHILS NFR BLD MANUAL: 0 %
BILIRUB SERPL-MCNC: 0.2 MG/DL (ref 0–1.2)
BUN SERPL-MCNC: 16 MG/DL (ref 6–23)
CALCIUM SERPL-MCNC: 9.2 MG/DL (ref 8.6–10.3)
CHLORIDE SERPL-SCNC: 101 MMOL/L (ref 98–107)
CO2 SERPL-SCNC: 36 MMOL/L (ref 21–32)
CREAT SERPL-MCNC: 0.49 MG/DL (ref 0.5–1.05)
EGFRCR SERPLBLD CKD-EPI 2021: >90 ML/MIN/1.73M*2
EOSINOPHIL # BLD MANUAL: 0.02 X10*3/UL (ref 0–0.7)
EOSINOPHIL NFR BLD MANUAL: 1 %
ERYTHROCYTE [DISTWIDTH] IN BLOOD BY AUTOMATED COUNT: 12.9 % (ref 11.5–14.5)
GLUCOSE SERPL-MCNC: 104 MG/DL (ref 74–99)
HCT VFR BLD AUTO: 30.9 % (ref 36–46)
HGB BLD-MCNC: 10.2 G/DL (ref 12–16)
IMM GRANULOCYTES # BLD AUTO: 0.01 X10*3/UL (ref 0–0.7)
IMM GRANULOCYTES NFR BLD AUTO: 0.5 % (ref 0–0.9)
LYMPHOCYTES # BLD MANUAL: 0.38 X10*3/UL (ref 1.2–4.8)
LYMPHOCYTES NFR BLD MANUAL: 20 %
MAGNESIUM SERPL-MCNC: 1.7 MG/DL (ref 1.6–2.4)
MCH RBC QN AUTO: 32 PG (ref 26–34)
MCHC RBC AUTO-ENTMCNC: 33 G/DL (ref 32–36)
MCV RBC AUTO: 97 FL (ref 80–100)
MONOCYTES # BLD MANUAL: 0.11 X10*3/UL (ref 0.1–1)
MONOCYTES NFR BLD MANUAL: 6 %
NEUTS SEG # BLD MANUAL: 1.35 X10*3/UL (ref 1.2–7)
NEUTS SEG NFR BLD MANUAL: 71 %
NRBC BLD-RTO: 0 /100 WBCS (ref 0–0)
PLATELET # BLD AUTO: 138 X10*3/UL (ref 150–450)
POTASSIUM SERPL-SCNC: 3.3 MMOL/L (ref 3.5–5.3)
PROT SERPL-MCNC: 6.5 G/DL (ref 6.4–8.2)
RAD ONC MSQ ACTUAL FRACTIONS DELIVERED: 24
RAD ONC MSQ ACTUAL SESSION DELIVERED DOSE: 200 CGRAY
RAD ONC MSQ ACTUAL TOTAL DOSE: 4800 CGRAY
RAD ONC MSQ ELAPSED DAYS: 34
RAD ONC MSQ LAST DATE: NORMAL
RAD ONC MSQ PRESCRIBED FRACTIONAL DOSE: 200 CGRAY
RAD ONC MSQ PRESCRIBED NUMBER OF FRACTIONS: 35
RAD ONC MSQ PRESCRIBED TECHNIQUE: NORMAL
RAD ONC MSQ PRESCRIBED TOTAL DOSE: 7000 CGRAY
RAD ONC MSQ PRESCRIPTION PATTERN COMMENT: NORMAL
RAD ONC MSQ START DATE: NORMAL
RAD ONC MSQ TREATMENT COURSE NUMBER: 1
RAD ONC MSQ TREATMENT SITE: NORMAL
RBC # BLD AUTO: 3.19 X10*6/UL (ref 4–5.2)
RBC MORPH BLD: ABNORMAL
SODIUM SERPL-SCNC: 144 MMOL/L (ref 136–145)
TOTAL CELLS COUNTED BLD: 100
VARIANT LYMPHS # BLD MANUAL: 0.04 X10*3/UL (ref 0–0.5)
VARIANT LYMPHS NFR BLD: 2 %
WBC # BLD AUTO: 1.9 X10*3/UL (ref 4.4–11.3)

## 2025-04-21 PROCEDURE — 83735 ASSAY OF MAGNESIUM: CPT

## 2025-04-21 PROCEDURE — 96413 CHEMO IV INFUSION 1 HR: CPT

## 2025-04-21 PROCEDURE — 85007 BL SMEAR W/DIFF WBC COUNT: CPT

## 2025-04-21 PROCEDURE — 96375 TX/PRO/DX INJ NEW DRUG ADDON: CPT | Mod: INF

## 2025-04-21 PROCEDURE — 99214 OFFICE O/P EST MOD 30 MIN: CPT | Performed by: NURSE PRACTITIONER

## 2025-04-21 PROCEDURE — 96361 HYDRATE IV INFUSION ADD-ON: CPT | Mod: INF

## 2025-04-21 PROCEDURE — 3074F SYST BP LT 130 MM HG: CPT | Performed by: NURSE PRACTITIONER

## 2025-04-21 PROCEDURE — 85027 COMPLETE CBC AUTOMATED: CPT

## 2025-04-21 PROCEDURE — 96367 TX/PROPH/DG ADDL SEQ IV INF: CPT

## 2025-04-21 PROCEDURE — 2500000004 HC RX 250 GENERAL PHARMACY W/ HCPCS (ALT 636 FOR OP/ED): Mod: JZ | Performed by: STUDENT IN AN ORGANIZED HEALTH CARE EDUCATION/TRAINING PROGRAM

## 2025-04-21 PROCEDURE — 1126F AMNT PAIN NOTED NONE PRSNT: CPT | Performed by: NURSE PRACTITIONER

## 2025-04-21 PROCEDURE — 77386 HC INTENSITY-MODULATED RADIATION THERAPY (IMRT), COMPLEX: CPT | Performed by: STUDENT IN AN ORGANIZED HEALTH CARE EDUCATION/TRAINING PROGRAM

## 2025-04-21 PROCEDURE — 80053 COMPREHEN METABOLIC PANEL: CPT

## 2025-04-21 PROCEDURE — 2500000004 HC RX 250 GENERAL PHARMACY W/ HCPCS (ALT 636 FOR OP/ED): Mod: JZ | Performed by: NURSE PRACTITIONER

## 2025-04-21 PROCEDURE — 3078F DIAST BP <80 MM HG: CPT | Performed by: NURSE PRACTITIONER

## 2025-04-21 RX ORDER — DEXAMETHASONE 6 MG/1
12 TABLET ORAL ONCE
Status: COMPLETED | OUTPATIENT
Start: 2025-04-21 | End: 2025-04-21

## 2025-04-21 RX ORDER — PROCHLORPERAZINE EDISYLATE 5 MG/ML
10 INJECTION INTRAMUSCULAR; INTRAVENOUS EVERY 6 HOURS PRN
Status: DISCONTINUED | OUTPATIENT
Start: 2025-04-21 | End: 2025-04-21 | Stop reason: HOSPADM

## 2025-04-21 RX ORDER — EPINEPHRINE 0.3 MG/.3ML
0.3 INJECTION SUBCUTANEOUS EVERY 5 MIN PRN
Status: DISCONTINUED | OUTPATIENT
Start: 2025-04-21 | End: 2025-04-21 | Stop reason: HOSPADM

## 2025-04-21 RX ORDER — DEXAMETHASONE 6 MG/1
12 TABLET ORAL ONCE
Status: CANCELLED | OUTPATIENT
Start: 2025-04-21 | End: 2025-04-21

## 2025-04-21 RX ORDER — FAMOTIDINE 10 MG/ML
20 INJECTION, SOLUTION INTRAVENOUS ONCE AS NEEDED
Status: DISCONTINUED | OUTPATIENT
Start: 2025-04-21 | End: 2025-04-21 | Stop reason: HOSPADM

## 2025-04-21 RX ORDER — DIPHENHYDRAMINE HYDROCHLORIDE 50 MG/ML
50 INJECTION, SOLUTION INTRAMUSCULAR; INTRAVENOUS AS NEEDED
Status: DISCONTINUED | OUTPATIENT
Start: 2025-04-21 | End: 2025-04-21 | Stop reason: HOSPADM

## 2025-04-21 RX ORDER — PROCHLORPERAZINE MALEATE 10 MG
10 TABLET ORAL EVERY 6 HOURS PRN
Status: DISCONTINUED | OUTPATIENT
Start: 2025-04-21 | End: 2025-04-21 | Stop reason: HOSPADM

## 2025-04-21 RX ORDER — ALBUTEROL SULFATE 0.83 MG/ML
3 SOLUTION RESPIRATORY (INHALATION) AS NEEDED
Status: DISCONTINUED | OUTPATIENT
Start: 2025-04-21 | End: 2025-04-21 | Stop reason: HOSPADM

## 2025-04-21 RX ORDER — SODIUM CHLORIDE AND POTASSIUM CHLORIDE 150; 900 MG/100ML; MG/100ML
999 INJECTION, SOLUTION INTRAVENOUS ONCE
Status: COMPLETED | OUTPATIENT
Start: 2025-04-21 | End: 2025-04-21

## 2025-04-21 RX ORDER — PALONOSETRON 0.05 MG/ML
0.25 INJECTION, SOLUTION INTRAVENOUS ONCE
Status: COMPLETED | OUTPATIENT
Start: 2025-04-21 | End: 2025-04-21

## 2025-04-21 RX ADMIN — CISPLATIN 54 MG: 1 INJECTION INTRAVENOUS at 10:52

## 2025-04-21 RX ADMIN — SODIUM CHLORIDE AND POTASSIUM CHLORIDE 999 ML/HR: .9; .15 SOLUTION INTRAVENOUS at 12:01

## 2025-04-21 RX ADMIN — FOSAPREPITANT 150 MG: 150 INJECTION, POWDER, LYOPHILIZED, FOR SOLUTION INTRAVENOUS at 10:17

## 2025-04-21 RX ADMIN — PALONOSETRON HYDROCHLORIDE 0.25 MG: 0.25 INJECTION INTRAVENOUS at 10:15

## 2025-04-21 RX ADMIN — DEXAMETHASONE 12 MG: 6 TABLET ORAL at 10:19

## 2025-04-21 RX ADMIN — MAGNESIUM SULFATE HEPTAHYDRATE 999 ML/HR: 500 INJECTION, SOLUTION INTRAMUSCULAR; INTRAVENOUS at 09:04

## 2025-04-21 ASSESSMENT — PAIN SCALES - GENERAL
PAINLEVEL_OUTOF10: 0-NO PAIN
PAINLEVEL_OUTOF10: 0-NO PAIN

## 2025-04-21 NOTE — PROGRESS NOTES
Patient here for follow up visit squamous cell superglottis. Peg in place no port, patient continues to decline port at this time.   Maintaining weight at 91 lbs.  Patient was seen by Michael Fregoso in infusion this morning.   Patient here alone    Medications and Allergies reviewed and reconciled this visit.    Follow up per MD request.    Patient reports availability and use of mychart, Reviewed this is a good place to communicate with the team as well as review labs and upcoming orders.      No barriers to education noted, patient agrees to current plan and verbalized understanding using teach back method.

## 2025-04-21 NOTE — PROGRESS NOTES
"NUTRITION Follow Up NOTE    Nutrition Assessment     Reason for Visit:  Ingris Mckinney is a 69 y.o. female who presents for primary SCC supraglottis  Pt in for radiation consult, asked to see 2/2 dx, weight status, weight loss    TX: Cisplatin with concurrent radiation  Patient lives in [place] alone, Zulema, sister, lives in [place], other sister in [facility]. Sisters help pt with buying groceries etc    Contact Zulema Mckinney [phone]    Her insurance company provides transportation    PEG placed 4/3/25  16 F legacy tube    4/21- Week 5. FUV in infusion. No new complaints. CNP provided her with an Ensure Complete to drink during treatment     Patient Active Problem List   Diagnosis    Acute on chronic respiratory failure with hypoxia and hypercapnia    Anxiety    Balance disorder    Blood loss anemia    Closed fracture of pelvis (Multi)    Closed fracture of right inferior pubic ramus    Degenerative joint disease (DJD) of hip    Depression    Essential hypertension    ETOH abuse    Hyperlipidemia    Hypoxemia    Centrilobular emphysema (Multi)    COPD with exacerbation (Multi)    Severe protein-calorie malnutrition (Multi)    Lesion of larynx    Primary squamous cell carcinoma of supraglottis       Nutrition Significant Labs:  Lab Results   Component Value Date/Time    GLUCOSE 104 (H) 04/21/2025 0747     04/21/2025 0747    K 3.3 (L) 04/21/2025 0747     04/21/2025 0747    CO2 36 (H) 04/21/2025 0747    ANIONGAP 10 04/21/2025 0747    BUN 16 04/21/2025 0747    CREATININE 0.49 (L) 04/21/2025 0747    EGFR >90 04/21/2025 0747    CALCIUM 9.2 04/21/2025 0747    ALBUMIN 4.0 04/21/2025 0747    ALKPHOS 40 04/21/2025 0747    PROT 6.5 04/21/2025 0747    AST 18 04/21/2025 0747    BILITOT 0.2 04/21/2025 0747    ALT 15 04/21/2025 0747    MG 1.70 04/21/2025 0747     No results found for: \"VITD25\"      Anthropometrics:               IBW/kg (Dietitian Calculated): 47.7 kg         Daily Weight  04/21/25 : (!) 41.7 kg (91 " lb 14.9 oz)  04/21/25 : (!) 41.7 kg (91 lb 14.9 oz)  04/17/25 : (!) 42.5 kg (93 lb 11.1 oz)  04/17/25 : (!) 42 kg (92 lb 7.7 oz)  04/14/25 : (!) 41.3 kg (90 lb 15 oz)  04/14/25 : (!) 41.3 kg (90 lb 15 oz)  04/10/25 : (!) 44.4 kg (97 lb 15.9 oz)  04/07/25 : (!) 43.2 kg (95 lb 3.8 oz)  04/07/25 : (!) 43.2 kg (95 lb 2.1 oz)  04/04/25 : (!) 44.8 kg (98 lb 12.3 oz)  03/31/25 : (!) 43 kg (94 lb 12.8 oz)  03/31/25 : (!) 43 kg (94 lb 12.8 oz)  03/27/25 : 46.1 kg (101 lb 10.1 oz)  03/24/25 : (!) 42.8 kg (94 lb 7.5 oz)  03/24/25 : (!) 42.8 kg (94 lb 7.5 oz)  03/20/25 : (!) 43.5 kg (95 lb 14.4 oz)  03/20/25 : (!) 43.5 kg (95 lb 12.6 oz)  03/18/25 : (!) 40.9 kg (90 lb 0.9 oz)  03/17/25 : (!) 42.2 kg (93 lb 0.6 oz)  03/13/25 : (!) 40.9 kg (90 lb 2.7 oz)     3/20- radonc wt 43.4kg / 95#  4/4- OTV wt 44.8 kg / 98#  4/14 - infusion 41.3 kg (90#)  4/21 - infusion 41.7 kg (91#)     Weight Change %:  Weight History / % Weight Change: weight stable over past week    Nutrition History:  Food & Nutrition History: no appetite. Continues to eat a frozen dinner  Food Allergies:    Food Intolerances:    Vitamin/mineral intake:       Herbal supplements:    Medication and Complementary/Alternative Medicine Use:    Dentition:    Sleep Habits:      Diet Recall:  Meal 1:    Snack 1:    Meal 2:    Snack 2:    Meal 3: frozen meal  Snack 3: spoonful of PNB, ice cream  Food Variety:    Oral Nutrition Supplement Use: Oral Nutrition Supplements: Boost Very High Calorie Frequency: back to 2/day Calories: 530 each Protein: 22 each  Fluid Intake: water  Energy Intake: Fair 50-75 %    Food Preparation:  Cooking: Patient, Family  Grocery Shopping: Family  Dining Out:      Medications:  Current Outpatient Medications   Medication Instructions    albuterol 90 mcg/actuation inhaler 2 puffs, Every 4 hours PRN    albuterol 2.5 mg, Every 4 hours PRN    alendronate (FOSAMAX) 70 mg, oral, Every 7 days, Take in the morning with a full glass of water, on an empty  stomach, and do not take anything else by mouth or lie down for the next 30 min.    dexAMETHasone (DECADRON) 8 mg, oral, Daily, For 3 days per week starting the day after treatment.    Dulera 200-5 mcg/actuation inhaler 2 puffs, 2 times daily    OLANZapine (ZYPREXA) 5 mg, oral, Nightly    ondansetron (ZOFRAN) 8 mg, oral, Every 8 hours PRN    prochlorperazine (COMPAZINE) 10 mg, oral, Every 6 hours PRN    rosuvastatin (CRESTOR) 10 mg, oral, Daily    umeclidinium (Incruse Ellipta) 62.5 mcg/actuation inhalation 1 puff, Daily RT       Nutrition Focused Physical Exam Findings: 4/7/25    Subcutaneous Fat Loss:        Muscle Wasting:       Physical Findings:          Estimated Needs:          Total Energy Estimated Needs in 24 hours (kCal): 1425 kCal  Energy Estimated Needs per kg Body Weight in 24 hours (kCal/kg): 35 kCal/kg (4/14- recalculated based on current weight 90#)  Total Protein Estimated Needs in 24 Hours (g): 61 g  Protein Estimated Needs per kg Body Weight in 24 Hours (g/kg): 1.5 g/kg  Total Fluid Estimated Needs in 24 Hours (mL): 1425 mL  Method for Estimating 24 Hour Fluid Needs: 1 ml/kcal                       Nutrition Diagnosis   Malnutrition Diagnosis  Patient has Malnutrition Diagnosis: Yes  Diagnosis Status: Active  Malnutrition Diagnosis: Severe malnutrition related to chronic disease or condition  Related to: 9% weight loss over 1 month, poor oral intake, findings on NFPE (severe depletion fat and muscle stores)    Nutrition Diagnosis  Patient has Nutrition Diagnosis: Yes  Diagnosis Status (1): Active  Nutrition Diagnosis 1: Predicted inadequate energy intake  Related to (1): pathophsyiology of disease and treatment  As Evidenced by (1): anticipated nutrition impact symptoms affecting oral intake and weight  Additional Nutrition Diagnosis: Diagnosis 3  Diagnosis Status (2): Active  Nutrition Diagnosis 2: Increased energy expenditure  Related to (2): increased metabolic demand, disease process  As  "Evidenced by (2): cancer diagnosis, planned treatment  Diagnosis Status (3): Active  Nutrition Diagnosis 3: Disordered eating pattern  Related to (3): patients meal habits  As Evidenced by (3): pt not eating, caring for self, will not prepare food  Additional Assessment Information (3): 4/14 - intake down \"I mostly didn't try\"       Nutrition Interventions/Recommendations   Nutrition Prescription: Oral nutrition soft/puree/liquid diet, high calorie high protein, management of NIS    Nutrition Interventions:   Food and Nutrient Delivery: Meals & Snacks: Energy-modified diet, Fluid-modified diet, Modification of schedule of oral intake, Protein-modified diet, Texture-Modified Diet  Goals: Stressed importance of taking Boost VHC 2/day everyday + frozen meal/1 meal per day and anything she can eat. If she cannot drink Boost VHC orally she can use through PEG, diluted slightly with water. Provided her with new syringe and advised to throw current one away, use and care of syringe reviewed. She is flushing once daily. Encouraged adequate fluids  Enteral Nutrition:  (+ PEG. When indicated Isosource 1.5 (3.5) cartons/day provides 1312 vaelntina, 60 grams pro, 669 ml free water. She can  use Boost VHC in PEG diluted with water, this was explained to her. Cesia holding onto enteral orders until needed and pt meets criteria)  Medical Food Supplement: Commercial beverage medical food supplement therapy  Goals: Continue Boost VHC BID + 1 plus supplement  Feeding Assistance Management: Mouth care management  Goals: Not using probiotic lozenges(forgets) - encouraged to be compliant with probiotic lozenges and expalined they are preventative and continue with s/s rinse  Other:: fluid  Goals: suggested using PEG for additional water/ hydration as needed. Explained she can flush 120 ml once daily and additional water flushes if she is not drinking enough orally     Coordination of Care: Collaboration and referral of nutrition care:  " (collaboration with other providers)  Goals: work with team to achieve best possible outcomes     DME: [facility]  Boost VHC BID  Provides 1060 calories and 44 grams protein  Comments   Enteral orders sent 4/3/25 Isosource 1.5 3.5 cartons per day to provide 1312 calories, 60 grams protein 669 ml free water  - brett holding enteral orders for time being  Pt [facility] O2 [facility]      Nutrition Education:   Nutrition Education Content:            Nutrition Monitoring and Evaluation   Food and Nutrient Intake  Monitoring and Evaluation Plan: Energy intake, Meal/snack pattern, Protein intake, Fluid intake, Amount of food  Energy Intake: Estimated energy intake  Criteria: Meet >75% estimated energy needs- food recall  Fluid Intake: Estimated fluid intake  Criteria: maintain hydration  Amount of Food: Medical food intake  Criteria: ONS as recommeded  Meal/Snack Pattern: Estimated meal and snack pattern  Criteria: 2-4 small meals/snacks per day  Estimated protein intake: Estimated protein intake  Criteria: meet >75% estimated protein needs - food recall    Anthropometric measurements  Monitoring and Evaluation Plan: Weight  Body Weight: Measured body weight  Criteria: maintain/gain weight         Nutrition Focused Physical Findings  Monitoring and Evaluation Plan: Adipose, Muscle, Digestive System  Adipose Finding: Loss of subcutaneous fat  Criteria: prevent further losses  Digestive System Finding: Constipation  Criteria: manage symptoms, adhere to regime  Muscle Finding: Muscle atrophy  Criteria: prevent further losses         Follow Up: through treatment      Time Spent  Prep time on day of patient encounter: 5 minutes  Time spent directly with patient, family or caregiver: 20 minutes  Additional Time Spent on Patient Care Activities: 0 minutes  Documentation Time: 15 minutes  Other Time Spent: 0 minutes  Total: 40 minutes

## 2025-04-21 NOTE — PROGRESS NOTES
Michael Fregoso CNP made aware of potassium of 3.3 - 20 mEq of potassium added to post hydration.

## 2025-04-21 NOTE — PROGRESS NOTES
Subjective:   Patient Name: Ingris Mckinney    : 1955     MRN: [id number]     Age: 69 y.o.     Gender: female  Referring Provider: ARNAV    CC: Management of newly diagnosed L sided supraglottic squamous cell carcinoma ([id number])    Presenting History (3/13/2025): At time of initial presentation a 69 y.o. female, current smoker (started at age 19,1/3 pack per day) with a past medical history of osteoporosis, HLD, COPD (occasionally on O2) referred for management of suprglottic cancer.      She has been following with ENT for history of vocal cord paralysis without CT abnormality since , which was successfully managed with injection medialization.     She then developed acute worsening of her voice in 2024, with scope exam shortly after that showed mucosal irregularity along the anterior left false cord. The left true vocal cord was fixed in the paramedian position. The right true vocal cord exhibited normal movement. She was taken to the operating room for direct and biopsy on 2025. Operative findings showed a left false cord mass lesion which was not obstructive, and extended from anterior to posterior. The mass extended to the laryngeal ventricle on the left, but spared the true vocal cord. The mass did not appear to cross to the right. The vallecula, base of tongue and hypopharynx were normal. Biopsies from the left supraglottic mass were positive for invasive moderately differentiated squamous cell carcinoma.     CT neck on 2025 showed an enhancing mass in the left supraglottic larynx, 1.0 x 1.7 x 1.4 cm in size, which approached the epiglottis without definitive invasion. There was an area of irregularity/thickening on the right true vocal cord of unclear etiology.     2025: PET/CT: 1.  Focal intense hypermetabolic activity at the left aryepiglottic fold compatible with biopsy-proven squamous cell carcinoma. 2. No evidence of hypermetabolic kevin or distant metastatic disease.3.  Mild hypermetabolic activity associated with the right upper lobe pulmonary nodule. Continued attention on follow-up chest CT is recommended.    She was seen by ENT Dr. Lo and unfortunately was deemed not surgically resectable. She presented to [facility] for a consultation accompanied by her brother in law. She lives on her own. She states that she has been lazy over the past months and has lost a lot of weight. She doesn't really eat much food other than cereals. Luckily her sister and in-law brought her dinner. She has gained 12 lb in the past weeks. She denies any dysphagia or odynophagia. NO pain. NO numbness or tingling. No heating issues. No fever or chills. No n/v/d/c    Social Hx:   No etoh or illicit drugs.   2 sisters   No kids      Treatment Summary:  - March 18, 2025 C1D1 Cisplatin - weekly with concurrent RT     Current Treatment:  - March 18, 2025 C1D1 Cisplatin 40mg/m2 - weekly with concurrent RT   - March 25, 2025 C1D8 Cisplatin 40mg/m2  - March 31, 2025 C1D15 Cisplatin 40mg/m2  - April 7, 2025 C1D22 Cisplatin 40mg/m2  - April 14, 2025 C1D29 Cisplatin 40mg/m2  - April 21, 2025 C1D36 Cisplatin 40mg/m2    Interval History (4/21/2025):    Patient presents today for toxicity check and treatment readiness visit. She is tolerating Cisplatin very well. She is alone at today's visit. She denies any fevers, chills or night sweats. + cough chronic COPD, no chest pain, JONES interferes with ADLs. On home oxygen has portable oxygen concentrator. No nausea or vomiting. Increased throat pain. + dysphagia. Now eating for pleasure only. Mainly liquids at this time. Have encouraged hydration via g-tube. Weight stable this week. Still encouraged tube feeds due to malnutrition. No constipation or diarrhea. No urinary symptoms. No rash. No neuropathy. Denies hearing changes or tinnitus. No further facial swelling.      G-tube placed 4/3/2025    Review of Systems:  A review of systems has been completed and are negative for  complaints except what is stated in the HPI and/or past medical history      Medical History:   Past Medical History:   Diagnosis Date    Anxiety     At risk for falling     COPD (chronic obstructive pulmonary disease) (Multi)     Hypercholesteremia     Hypoxia     Larynx cancer (Multi)     Malnutrition (Multi)     Personal history of other diseases of the respiratory system     History of chronic obstructive lung disease    Respiratory failure (Multi)     Tachycardia     Weakness        Surgical History:   Past Surgical History:   Procedure Laterality Date    CT GUIDED IMAGING FOR NEEDLE PLACEMENT  05/05/2020    CT GUIDED IMAGING FOR NEEDLE PLACEMENT [facility]    LARYNGOSCOPY      direct w/ bx    OTHER SURGICAL HISTORY  11/11/2022    Hip surgery    OTHER SURGICAL HISTORY  11/11/2022    Arm surgery    OTHER SURGICAL HISTORY  11/11/2022    Leg surgery       Family History:  Family History   Problem Relation Name Age of Onset    Other (chronic lung disease) Other         Allergies:  Allergies   Allergen Reactions    Scallops Anaphylaxis    Iodinated Contrast Media Itching       Meds (Current):  Current Medications[1]      Pain Assessment:  Pain is: 0    Performance Status (ECOG): 2  ECOG Definition  0 Fully active; no performance restrictions.  1 Strenuous physical activity restricted; fully ambulatory and able to carry out light work.  2 Capable of all self-care but unable to carry out any work activities. Up and about >50% of waking hours.  3 Capable of only limited self-care; confined to bed or chair >50% of waking hours.  4 Completely disabled; cannot carry out any self-care; totally confined to bed or chair.  Exam:    Vital Signs:  /60 (BP Location: Right arm, Patient Position: Sitting, BP Cuff Size: Small adult)   Pulse 103   Temp 36.8 °C (98.2 °F) (Temporal)   Resp 18   Wt (!) 41.7 kg (91 lb 14.9 oz)   SpO2 93%   BMI 16.92 kg/m²     Wt Readings from Last 5 Encounters:   04/21/25 (!) 41.7  kg (91 lb 14.9 oz)   25 (!) 41.7 kg (91 lb 14.9 oz)   25 (!) 42.5 kg (93 lb 11.1 oz)   25 (!) 42 kg (92 lb 7.7 oz)   25 (!) 41.3 kg (90 lb 15 oz)     Physical Exam:  ECO  Pain: 1  Constitutional: Well developed, awake/alert/oriented x3, no distress, alert and cooperative  Eyes: PER. sclera anicteric  ENMT: Oral mucosa moist, no lesions or thrush identified  Respiratory/Thorax: Breathing is non-labored. Lungs are clear to auscultation bilaterally. No adventitious breath sounds  Cardiovascular: S1-S2. Regular rate and rhythm. No murmurs, rubs, or gallops appreciated  Gastrointestinal: Abdomen soft nontender, nondistended, normal active bowel sounds.  Musculoskeletal: ROM intact, no joint swelling, normal strength  Extremities: normal extremities, no cyanosis, no edema, no clubbing  Lymphatics: no palpable lymphadenopathy   Skin: no rash  Neurologic: alert and oriented x3. Nonfocal exam. No myoclonus  Psychological: Pleasant, appropriate and easily engaged     Labs:  Lab Results   Component Value Date    WBC 1.9 (L) 2025    HGB 10.2 (L) 2025    HCT 30.9 (L) 2025    MCV 97 2025     (L) 2025      Lab Results   Component Value Date    NEUTROABS 3.26 2025      Lab Results   Component Value Date    GLUCOSE 104 (H) 2025    CALCIUM 9.2 2025     2025    K 3.3 (L) 2025    CO2 36 (H) 2025     2025    BUN 16 2025    CREATININE 0.49 (L) 2025    MG 1.70 2025     Lab Results   Component Value Date    ALT 15 2025    AST 18 2025    ALKPHOS 40 2025    BILITOT 0.2 2025      Lab Results   Component Value Date    TSH 4.09 (H) 2025    FREET4 1.06 2025              Assessment/Plan:   69 y.o. female with past medical history as stated referred for management of supraglottic squamous cell carcinoma.    The patient's records and imaging have been reviewed in detail.  I have  discussed with the patient the natural history of their disease at length.  The following has also been discussed with the patient:    # Cancer:   L sided supraglottic squamous cell carcinoma ([id number]). Given that she is not surgically resectable. I thus recommend treatment of weekly cisplatin with concurrent RT for 7 weeks. Side effects of chenotherapy including but not limited to nausea, vomiting, diarrhea, anemia, thrombocytopenia,  leukopenia, neutropenic fever, kidney toxicities, liver toxicities, rash, infusion related reaction, secondary malignancies MDS or AML, and fertility impact, neuropathy and hearing impact. Consent signed. To start next week.     - Interval Imagin2025: PET/CT: 1.  Focal intense hypermetabolic activity at the left aryepiglottic fold compatible with biopsy-proven squamous cell carcinoma. 2. No evidence of hypermetabolic kevin or distant metastatic disease.3. Mild hypermetabolic activity associated with the right upper lobe pulmonary nodule. Continued attention on follow-up chest CT is recommended.    # Pulmonary nodule: will continue to follow.    # Clinical Trials:  None currently.     # Access: Peripheral veins. Will likely require Mediport.     # Pain: No acute pain.    # Bone Health: No signs of bony disease.     # Psychosocial: Coping well, no acute issues.  Good support from family & friends.  Social work support offered.  **OKAY to discuss treatment plans with sister.     # Hearing: NO baseline hearing issues.    # Speech and swallow: Order for PEG Tube placement   Has been sent to GI (will not put PEG in), General Surgery (consult appt ) and [facility] (no longer places PEGs)  Call out to IR at [facility] and social work will assist in transportation.   4/3/2025: G-tube was placed at [facility]  2025: Weight loss identified this week. Increased dysphagia and pain. Will need to start tube feeds. Soft food for pleasure.  2025: 2 Boost/Ensure daily.  Encouraged use of tube feed.     # GI/CINV: severe malnutrition. Nutrition following.    # Smoking: N/A, current smoker, working on quitting.    # Fertility: N/A.  Oncofertility support available as needed.      # Heme/ESAs: N/A.    #JONES: Has home oxygen ([employer]). Portable concentrator ordered. Oxygen 93% RA at rest. 88% walking short distance with assistance. 97% at rest on 2L via NC.     # GOC: Patient is aware of curative intent goals of care.    # Language: English.    # Interdisciplinary Patient/Family Education Record:  Learner:  Patient.  Learning Needs Reviewed:  Treatments, Medications, Disease Process, Diagnostic Tests.  Barriers: None.  Teaching Method: Discussion, Handout(s).  Comprehension:  Verbalized Understanding.  Follow-up Plan:  Return to office as per MD.    # Dispo:   RTC in 1 week   Continue chemoRT - weekly Cisplatin.  Hydration weekly on Thursdays     Michael Fregoso, APRN-CNP  [facility]  Thoracic + H&N Medical Oncology     I spent a total of 35+ minutes on the date of the service which included preparing to see the patient, face-to-face patient care, completing clinical documentation, obtaining and / or reviewing separately obtained history, counseling and educating the patient/family/caregiver, ordering medications, tests, or procedures, communicating with other healthcare providers (not separately reported), independently interpreting results, not separately reported, and communicating results to the patient/family/caregiver, Name and date of birth verified.              [1]   Current Outpatient Medications:     albuterol 2.5 mg /3 mL (0.083 %) nebulizer solution, Take 3 mL (2.5 mg) by nebulization every 4 hours if needed for wheezing., Disp: , Rfl:     albuterol 90 mcg/actuation inhaler, Inhale 2 puffs every 4 hours if needed., Disp: , Rfl:     alendronate (Fosamax) 70 mg tablet, Take 1 tablet (70 mg) by mouth every 7 days. Take in the morning with a full glass of water, on  an empty stomach, and do not take anything else by mouth or lie down for the next 30 min., Disp: 12 tablet, Rfl: 3    dexAMETHasone (Decadron) 4 mg tablet, Take 2 tablets (8 mg) by mouth once daily. For 3 days per week starting the day after treatment., Disp: 42 tablet, Rfl: 0    Dulera 200-5 mcg/actuation inhaler, Inhale 2 puffs twice a day., Disp: , Rfl:     OLANZapine (ZyPREXA) 5 mg tablet, Take 1 tablet (5 mg) by mouth once daily at bedtime., Disp: 30 tablet, Rfl: 1    ondansetron (Zofran) 8 mg tablet, Take 1 tablet (8 mg) by mouth every 8 hours if needed for nausea or vomiting., Disp: 30 tablet, Rfl: 5    prochlorperazine (Compazine) 10 mg tablet, Take 1 tablet (10 mg) by mouth every 6 hours if needed for nausea or vomiting., Disp: 30 tablet, Rfl: 5    rosuvastatin (Crestor) 10 mg tablet, Take 1 tablet (10 mg) by mouth once daily., Disp: 90 tablet, Rfl: 2    umeclidinium (Incruse Ellipta) 62.5 mcg/actuation inhalation, Inhale 1 puff (62.5 mcg) once daily., Disp: , Rfl:   No current facility-administered medications for this visit.    Facility-Administered Medications Ordered in Other Visits:     albuterol 2.5 mg /3 mL (0.083 %) nebulizer solution 3 mL, 3 mL, nebulization, PRN, Kimmie Waters MD MPH    dextrose 5 % in water (D5W) bolus 500 mL, 500 mL, intravenous, PRN, Kimmie Waters MD MPH    diphenhydrAMINE (BENADryl) injection 50 mg, 50 mg, intravenous, PRN, Kimmie Waters MD MPH    EPINEPHrine (Epipen) injection syringe 0.3 mg, 0.3 mg, intramuscular, q5 min PRN, Kimmie Waters MD MPH    famotidine PF (Pepcid) injection 20 mg, 20 mg, intravenous, Once PRN, Kimmie Waters MD MPH    methylPREDNISolone sod succinate (SOLU-Medrol) 40 mg/mL injection 40 mg, 40 mg, intravenous, PRN, Kimmie Waters MD MPH    prochlorperazine (Compazine) injection 10 mg, 10 mg, intravenous, q6h PRN, Kimmie Waters MD MPH    prochlorperazine (Compazine) tablet 10 mg, 10 mg, oral, q6h PRN, Kimmie Waters MD MPH    sodium chloride 0.9 % bolus 500 mL, 500 mL,  intravenous, PRN, Kimmie Waters MD MPH    sodium chloride 0.9 % with KCl 20 mEq/L infusion, 999 mL/hr, intravenous, Once, Michael Fregoso, APRN-CNP

## 2025-04-22 ENCOUNTER — HOSPITAL ENCOUNTER (OUTPATIENT)
Dept: RADIATION ONCOLOGY | Facility: CLINIC | Age: 70
Setting detail: RADIATION/ONCOLOGY SERIES
Discharge: HOME | End: 2025-04-22
Payer: MEDICARE

## 2025-04-22 ENCOUNTER — APPOINTMENT (OUTPATIENT)
Dept: RADIATION ONCOLOGY | Facility: CLINIC | Age: 70
End: 2025-04-22
Payer: MEDICARE

## 2025-04-22 DIAGNOSIS — C32.8 MALIGNANT NEOPLASM OF OVERLAPPING SITES OF LARYNX (MULTI): ICD-10-CM

## 2025-04-22 DIAGNOSIS — Z51.0 ENCOUNTER FOR ANTINEOPLASTIC RADIATION THERAPY: ICD-10-CM

## 2025-04-22 LAB
RAD ONC MSQ ACTUAL FRACTIONS DELIVERED: 25
RAD ONC MSQ ACTUAL SESSION DELIVERED DOSE: 200 CGRAY
RAD ONC MSQ ACTUAL TOTAL DOSE: 5000 CGRAY
RAD ONC MSQ ELAPSED DAYS: 35
RAD ONC MSQ LAST DATE: NORMAL
RAD ONC MSQ PRESCRIBED FRACTIONAL DOSE: 200 CGRAY
RAD ONC MSQ PRESCRIBED NUMBER OF FRACTIONS: 35
RAD ONC MSQ PRESCRIBED TECHNIQUE: NORMAL
RAD ONC MSQ PRESCRIBED TOTAL DOSE: 7000 CGRAY
RAD ONC MSQ PRESCRIPTION PATTERN COMMENT: NORMAL
RAD ONC MSQ START DATE: NORMAL
RAD ONC MSQ TREATMENT COURSE NUMBER: 1
RAD ONC MSQ TREATMENT SITE: NORMAL

## 2025-04-22 PROCEDURE — 77386 HC INTENSITY-MODULATED RADIATION THERAPY (IMRT), COMPLEX: CPT | Performed by: STUDENT IN AN ORGANIZED HEALTH CARE EDUCATION/TRAINING PROGRAM

## 2025-04-22 PROCEDURE — 77336 RADIATION PHYSICS CONSULT: CPT | Performed by: STUDENT IN AN ORGANIZED HEALTH CARE EDUCATION/TRAINING PROGRAM

## 2025-04-23 ENCOUNTER — HOSPITAL ENCOUNTER (OUTPATIENT)
Dept: RADIATION ONCOLOGY | Facility: CLINIC | Age: 70
Setting detail: RADIATION/ONCOLOGY SERIES
Discharge: HOME | End: 2025-04-23
Payer: MEDICARE

## 2025-04-23 ENCOUNTER — APPOINTMENT (OUTPATIENT)
Dept: RADIATION ONCOLOGY | Facility: CLINIC | Age: 70
End: 2025-04-23
Payer: MEDICARE

## 2025-04-23 DIAGNOSIS — C32.8 MALIGNANT NEOPLASM OF OVERLAPPING SITES OF LARYNX (MULTI): ICD-10-CM

## 2025-04-23 DIAGNOSIS — Z51.0 ENCOUNTER FOR ANTINEOPLASTIC RADIATION THERAPY: ICD-10-CM

## 2025-04-23 LAB
RAD ONC MSQ ACTUAL FRACTIONS DELIVERED: 26
RAD ONC MSQ ACTUAL SESSION DELIVERED DOSE: 200 CGRAY
RAD ONC MSQ ACTUAL TOTAL DOSE: 5200 CGRAY
RAD ONC MSQ ELAPSED DAYS: 36
RAD ONC MSQ LAST DATE: NORMAL
RAD ONC MSQ PRESCRIBED FRACTIONAL DOSE: 200 CGRAY
RAD ONC MSQ PRESCRIBED NUMBER OF FRACTIONS: 35
RAD ONC MSQ PRESCRIBED TECHNIQUE: NORMAL
RAD ONC MSQ PRESCRIBED TOTAL DOSE: 7000 CGRAY
RAD ONC MSQ PRESCRIPTION PATTERN COMMENT: NORMAL
RAD ONC MSQ START DATE: NORMAL
RAD ONC MSQ TREATMENT COURSE NUMBER: 1
RAD ONC MSQ TREATMENT SITE: NORMAL

## 2025-04-23 PROCEDURE — 77386 HC INTENSITY-MODULATED RADIATION THERAPY (IMRT), COMPLEX: CPT | Performed by: STUDENT IN AN ORGANIZED HEALTH CARE EDUCATION/TRAINING PROGRAM

## 2025-04-24 ENCOUNTER — APPOINTMENT (OUTPATIENT)
Dept: RADIATION ONCOLOGY | Facility: CLINIC | Age: 70
End: 2025-04-24
Payer: MEDICARE

## 2025-04-24 ENCOUNTER — INFUSION (OUTPATIENT)
Dept: HEMATOLOGY/ONCOLOGY | Facility: CLINIC | Age: 70
End: 2025-04-24
Payer: MEDICARE

## 2025-04-24 ENCOUNTER — HOSPITAL ENCOUNTER (OUTPATIENT)
Dept: RADIATION ONCOLOGY | Facility: CLINIC | Age: 70
Setting detail: RADIATION/ONCOLOGY SERIES
Discharge: HOME | End: 2025-04-24
Payer: MEDICARE

## 2025-04-24 VITALS
OXYGEN SATURATION: 96 % | DIASTOLIC BLOOD PRESSURE: 67 MMHG | RESPIRATION RATE: 19 BRPM | HEART RATE: 68 BPM | SYSTOLIC BLOOD PRESSURE: 102 MMHG | TEMPERATURE: 97.2 F | WEIGHT: 93.81 LBS | BODY MASS INDEX: 17.26 KG/M2

## 2025-04-24 VITALS
RESPIRATION RATE: 18 BRPM | WEIGHT: 96.01 LBS | TEMPERATURE: 97.5 F | BODY MASS INDEX: 17.67 KG/M2 | DIASTOLIC BLOOD PRESSURE: 63 MMHG | SYSTOLIC BLOOD PRESSURE: 126 MMHG | OXYGEN SATURATION: 90 % | HEART RATE: 107 BPM

## 2025-04-24 DIAGNOSIS — C32.8 MALIGNANT NEOPLASM OF OVERLAPPING SITES OF LARYNX (MULTI): ICD-10-CM

## 2025-04-24 DIAGNOSIS — C32.1 PRIMARY SQUAMOUS CELL CARCINOMA OF SUPRAGLOTTIS: ICD-10-CM

## 2025-04-24 DIAGNOSIS — Z51.0 ENCOUNTER FOR ANTINEOPLASTIC RADIATION THERAPY: ICD-10-CM

## 2025-04-24 LAB
RAD ONC MSQ ACTUAL FRACTIONS DELIVERED: 27
RAD ONC MSQ ACTUAL SESSION DELIVERED DOSE: 200 CGRAY
RAD ONC MSQ ACTUAL TOTAL DOSE: 5400 CGRAY
RAD ONC MSQ ELAPSED DAYS: 37
RAD ONC MSQ LAST DATE: NORMAL
RAD ONC MSQ PRESCRIBED FRACTIONAL DOSE: 200 CGRAY
RAD ONC MSQ PRESCRIBED NUMBER OF FRACTIONS: 35
RAD ONC MSQ PRESCRIBED TECHNIQUE: NORMAL
RAD ONC MSQ PRESCRIBED TOTAL DOSE: 7000 CGRAY
RAD ONC MSQ PRESCRIPTION PATTERN COMMENT: NORMAL
RAD ONC MSQ START DATE: NORMAL
RAD ONC MSQ TREATMENT COURSE NUMBER: 1
RAD ONC MSQ TREATMENT SITE: NORMAL

## 2025-04-24 PROCEDURE — 77386 HC INTENSITY-MODULATED RADIATION THERAPY (IMRT), COMPLEX: CPT | Performed by: STUDENT IN AN ORGANIZED HEALTH CARE EDUCATION/TRAINING PROGRAM

## 2025-04-24 PROCEDURE — 2500000004 HC RX 250 GENERAL PHARMACY W/ HCPCS (ALT 636 FOR OP/ED): Mod: JZ | Performed by: STUDENT IN AN ORGANIZED HEALTH CARE EDUCATION/TRAINING PROGRAM

## 2025-04-24 PROCEDURE — 96360 HYDRATION IV INFUSION INIT: CPT | Mod: INF

## 2025-04-24 RX ADMIN — SODIUM CHLORIDE 1000 ML: 9 INJECTION, SOLUTION INTRAVENOUS at 11:29

## 2025-04-24 ASSESSMENT — ENCOUNTER SYMPTOMS
OCCASIONAL FEELINGS OF UNSTEADINESS: 0
DEPRESSION: 0
LOSS OF SENSATION IN FEET: 0

## 2025-04-24 ASSESSMENT — PAIN SCALES - GENERAL
PAINLEVEL_OUTOF10: 0-NO PAIN
PAINLEVEL_OUTOF10: 0-NO PAIN

## 2025-04-24 NOTE — PROGRESS NOTES
Radiation Oncology On Treatment Visit    Patient Name:  Ingris Mckinney  MRN:  [id number]  :  1955    Referring Provider: No ref. provider found  Primary Care Provider: Marge Camacho MD  Care Team: Patient Care Team:  Marge Camacho MD as PCP - General (Internal Medicine)  Ngoc Selby MD as PCP - [employer] PCP  Noemy Rivas DO as Radiation Oncologist (Radiation Oncology)  Tamiko Mahoney RN as Registered Nurse (Radiation Therapy)  Daphney Loo RN as Nurse Navigator (Hematology and Oncology)  Kimmie Waters MD MPH as Consulting Physician (Hematology and Oncology)  RADHA Mendoza-CNP as Nurse Practitioner (Hematology and Oncology)    Date of Service: 2025     Diagnosis:   Specialty Problems          Radiation Oncology Problems    Primary squamous cell carcinoma of supraglottis         Treatment Summary:  Radiation Therapy    Treatment Period Technique Fraction Dose Fractions Total Dose   Course 1 3/18/2025-2025  (days elapsed: 37)         H&N 3/18/2025-2025 VMAT 200 / 200 cGy  5,400 / 7,000 cGy     SUBJECTIVE: Tolerating well. Increased weight from last week. Tolerating PO diet + 2 Boosts/day. Denies any significant dysphagia or odynophagia. Has some mildly thickened mucous. Mild dermatitis along neck and has not been using cream, discussed starting today.       OBJECTIVE:   Vital Signs:  /63   Pulse 107   Temp 36.4 °C (97.5 °F) (Temporal)   Resp 18   Wt (!) 43.6 kg (96 lb 0.2 oz)   SpO2 90%   BMI 17.67 kg/m²     Daily Weight  25 : (!) 43.6 kg (96 lb 0.2 oz)  25 : (!) 42.5 kg (93 lb 12.9 oz)  25 : (!) 41.7 kg (91 lb 14.9 oz)  25 : (!) 41.7 kg (91 lb 14.9 oz)  25 : (!) 42.5 kg (93 lb 11.1 oz)  25 : (!) 42 kg (92 lb 7.7 oz)  25 : (!) 41.3 kg (90 lb 15 oz)  25 : (!) 41.3 kg (90 lb 15 oz)  04/10/25 : (!) 44.4 kg (97 lb 15.9 oz)  25 : (!) 43.2 kg (95 lb 3.8 oz)     Other Pertinent Findings:      Toxicity Assessment          3/20/2025    10:44 3/20/2025    10:46 3/27/2025    11:14 4/4/2025    13:06 4/10/2025    12:45 4/17/2025    12:50 4/24/2025    13:11   Toxicity Assessment   Treatment  and neck  Head and neck Head and neck Head and neck Head and neck Head and neck   Anorexia Grade 0       eating regular food plus 2 anna calorie Boosts/day Grade 0       drinking 2 high calorie Boosts/day and eating regular foods Grade 0       Drinking 2 high protein shakes/day and eating what she wants Grade 1       Eating soft foods and drinking 1-2 high calorie boosts/day. Patient just had Peg placed yesterday Grade 0       drinking 2 boost plus eating Grade 1       eating the meals provided and 2 cans of Boost/day Grade 1       eating frozen meals and drinking 2 boosts/day   Anxiety  Grade 1 Grade 0 Grade 0 Grade 0 Grade 1 Grade 1   Dehydration  Grade 0 Grade 0 Grade 0 Grade 0 Grade 0 Grade 0   Depression  Grade 0 Grade 0 Grade 0 Grade 0 Grade 0 Grade 1   Dermatitis Radiation  Grade 0 Grade 0 Grade 0 Grade 1       some redness Grade 0 Grade 1       dry skin   Diarrhea  Grade 1       with anxiety Grade 0 Grade 0 Grade 0 Grade 0 Grade 0   Fatigue  Grade 1 Grade 1       naps during the day Grade 1       naps during the day Grade 1       taking naps, going to bed early Grade 1       naps during the day Grade 1   Fibrosis Deep Connective Tissue     Grade 0     Fracture     Grade 0     Nausea  Grade 0 Grade 0 Grade 0 Grade 0 Grade 0 Grade 0   Pain  Grade 0 Grade 0 Grade 2       pain level 8 to abdomen--from Peg placement Grade 0 Grade 0 Grade 0   Treatment Related Secondary Malignancy     Grade 0     Tumor Pain     Grade 0     Vomiting  Grade 0 Grade 0 Grade 0 Grade 0 Grade 0 Grade 0   Dysphagia  Grade 0 Grade 0 Grade 0 Grade 0 Grade 0 Grade 1   Esophagitis  Grade 0 Grade 0 Grade 1 Grade 0 Grade 0 Grade 1       occasional   Mucositis Oral  Grade 0       using salt and soda rinse and Blis lozenges Grade 0 Grade 0  Grade 0       using baking soda and salt rinse, blis lozenges Grade 0 Grade 0   Hearing Impaired  Grade 0 Grade 0 Grade 0 Grade 0 Grade 0 Grade 0   Blurred Vision  Grade 0 Grade 0 Grade 0 Grade 0 Grade 0 Grade 0   Dry Eye  Grade 0 Grade 0 Grade 0 Grade 0 Grade 0 Grade 0   Eye Pain  Grade 0 Grade 0 Grade 0 Grade 0 Grade 0 Grade 0   Retinopathy     Grade 0     Dry Mouth  Grade 0 Grade 1 Grade 0 Grade 1 Grade 1 Grade 1   Pneumonitis     Grade 0     Ear Pain  Grade 0 Grade 0 Grade 0 Grade 0 Grade 0 Grade 0   External Ear Pain  Grade 0 Grade 0 Grade 0 Grade 0 Grade 0 Grade 0   Tinnitus  Grade 0 Grade 0 Grade 0 Grade 0 Grade 0 Grade 0   Watering Eyes     Grade 0 Grade 0 Grade 0   Oral Pain  Grade 0 Grade 0 Grade 0 Grade 0 Grade 0 Grade 0   Salivary Duct Inflammation   Grade 0 Grade 0 Grade 0 Grade 1 Grade 1   Edema Face  Grade 0 Grade 0  Grade 0 Grade 0 Grade 0   Malaise     Grade 0     Neck Edema   Grade 0 Grade 1       new. started early this am Grade 0 Grade 0 Grade 0   Head Soft Tissue Necrosis     Grade 0     Neck Soft Tissue Necrosis     Grade 0     Osteonecrosis of Jaw     Grade 0     Superficial Soft Tissue Fibrosis     Grade 0     Trismus  Grade 0 Grade 0 Grade 0 Grade 0 Grade 0 Grade 0   Dysarthria  Grade 0 Grade 0 Grade 0 Grade 0 Grade 0 Grade 0   Dysesthesia  Grade 0 Grade 0 Grade 0 Grade 0 Grade 0 Grade 0   Dysgeusia  Grade 0 Grade 0 Grade 0 Grade 0 Grade 1 Grade 0   Facial Nerve Disorder     Grade 0     Hypoglossal Nerve Disorder     Grade 0     Oculomotor Nerve Disorder     Grade 0     Paresthesia     Grade 0     Stroke     Grade 0     Transient Ischemic Attacks     Grade 0     Trigeminal Nerve Disorder     Grade 0     Aspiration  Grade 0 Grade 0 Grade 0 Grade 0 Grade 0    Hoarseness  Grade 2 Grade 1       baseline Grade 3 Grade 3 Grade 3 Grade 2   Laryngeal Edema     Grade 0     Stridor     Grade 0     Tracheal Mucositis     Grade 0     Voice Alteration  Grade 2 Grade 1 Grade 2 Grade 2 Grade 3 Grade 2         Assessment / Plan:  The patient is tolerating radiation therapy as anticipated.  Continue per current treatment plan.

## 2025-04-25 ENCOUNTER — HOSPITAL ENCOUNTER (OUTPATIENT)
Dept: RADIATION ONCOLOGY | Facility: CLINIC | Age: 70
Setting detail: RADIATION/ONCOLOGY SERIES
Discharge: HOME | End: 2025-04-25
Payer: MEDICARE

## 2025-04-25 ENCOUNTER — APPOINTMENT (OUTPATIENT)
Dept: RADIATION ONCOLOGY | Facility: CLINIC | Age: 70
End: 2025-04-25
Payer: MEDICARE

## 2025-04-25 DIAGNOSIS — C32.8 MALIGNANT NEOPLASM OF OVERLAPPING SITES OF LARYNX (MULTI): ICD-10-CM

## 2025-04-25 DIAGNOSIS — Z51.0 ENCOUNTER FOR ANTINEOPLASTIC RADIATION THERAPY: ICD-10-CM

## 2025-04-25 LAB
RAD ONC MSQ ACTUAL FRACTIONS DELIVERED: 28
RAD ONC MSQ ACTUAL SESSION DELIVERED DOSE: 200 CGRAY
RAD ONC MSQ ACTUAL TOTAL DOSE: 5600 CGRAY
RAD ONC MSQ ELAPSED DAYS: 38
RAD ONC MSQ LAST DATE: NORMAL
RAD ONC MSQ PRESCRIBED FRACTIONAL DOSE: 200 CGRAY
RAD ONC MSQ PRESCRIBED NUMBER OF FRACTIONS: 35
RAD ONC MSQ PRESCRIBED TECHNIQUE: NORMAL
RAD ONC MSQ PRESCRIBED TOTAL DOSE: 7000 CGRAY
RAD ONC MSQ PRESCRIPTION PATTERN COMMENT: NORMAL
RAD ONC MSQ START DATE: NORMAL
RAD ONC MSQ TREATMENT COURSE NUMBER: 1
RAD ONC MSQ TREATMENT SITE: NORMAL

## 2025-04-25 PROCEDURE — 77386 HC INTENSITY-MODULATED RADIATION THERAPY (IMRT), COMPLEX: CPT | Performed by: STUDENT IN AN ORGANIZED HEALTH CARE EDUCATION/TRAINING PROGRAM

## 2025-04-28 ENCOUNTER — HOSPITAL ENCOUNTER (OUTPATIENT)
Dept: RADIATION ONCOLOGY | Facility: CLINIC | Age: 70
Setting detail: RADIATION/ONCOLOGY SERIES
Discharge: HOME | End: 2025-04-28
Payer: MEDICARE

## 2025-04-28 ENCOUNTER — APPOINTMENT (OUTPATIENT)
Dept: RADIATION ONCOLOGY | Facility: CLINIC | Age: 70
End: 2025-04-28
Payer: MEDICARE

## 2025-04-28 ENCOUNTER — INFUSION (OUTPATIENT)
Dept: HEMATOLOGY/ONCOLOGY | Facility: CLINIC | Age: 70
End: 2025-04-28
Payer: MEDICARE

## 2025-04-28 ENCOUNTER — OFFICE VISIT (OUTPATIENT)
Dept: HEMATOLOGY/ONCOLOGY | Facility: CLINIC | Age: 70
End: 2025-04-28
Payer: MEDICARE

## 2025-04-28 VITALS
BODY MASS INDEX: 16.17 KG/M2 | RESPIRATION RATE: 18 BRPM | HEART RATE: 112 BPM | TEMPERATURE: 97.7 F | WEIGHT: 87.85 LBS | SYSTOLIC BLOOD PRESSURE: 104 MMHG | DIASTOLIC BLOOD PRESSURE: 64 MMHG | OXYGEN SATURATION: 96 %

## 2025-04-28 VITALS
SYSTOLIC BLOOD PRESSURE: 104 MMHG | HEART RATE: 112 BPM | BODY MASS INDEX: 16.17 KG/M2 | DIASTOLIC BLOOD PRESSURE: 64 MMHG | TEMPERATURE: 97.7 F | RESPIRATION RATE: 18 BRPM | WEIGHT: 87.85 LBS

## 2025-04-28 DIAGNOSIS — C32.1 PRIMARY SQUAMOUS CELL CARCINOMA OF SUPRAGLOTTIS: ICD-10-CM

## 2025-04-28 DIAGNOSIS — R53.0 NEOPLASTIC MALIGNANT RELATED FATIGUE: ICD-10-CM

## 2025-04-28 DIAGNOSIS — Z51.0 ENCOUNTER FOR ANTINEOPLASTIC RADIATION THERAPY: ICD-10-CM

## 2025-04-28 DIAGNOSIS — E43 SEVERE PROTEIN-CALORIE MALNUTRITION (MULTI): Primary | ICD-10-CM

## 2025-04-28 DIAGNOSIS — C32.8 MALIGNANT NEOPLASM OF OVERLAPPING SITES OF LARYNX (MULTI): ICD-10-CM

## 2025-04-28 LAB
ALBUMIN SERPL BCP-MCNC: 4 G/DL (ref 3.4–5)
ALP SERPL-CCNC: 43 U/L (ref 33–136)
ALT SERPL W P-5'-P-CCNC: 14 U/L (ref 7–45)
ANION GAP SERPL CALC-SCNC: 14 MMOL/L (ref 10–20)
AST SERPL W P-5'-P-CCNC: 17 U/L (ref 9–39)
BASOPHILS # BLD AUTO: 0.01 X10*3/UL (ref 0–0.1)
BASOPHILS NFR BLD AUTO: 0.4 %
BILIRUB SERPL-MCNC: 0.4 MG/DL (ref 0–1.2)
BUN SERPL-MCNC: 10 MG/DL (ref 6–23)
CALCIUM SERPL-MCNC: 8.6 MG/DL (ref 8.6–10.3)
CHLORIDE SERPL-SCNC: 98 MMOL/L (ref 98–107)
CO2 SERPL-SCNC: 30 MMOL/L (ref 21–32)
CREAT SERPL-MCNC: 0.43 MG/DL (ref 0.5–1.05)
EGFRCR SERPLBLD CKD-EPI 2021: >90 ML/MIN/1.73M*2
EOSINOPHIL # BLD AUTO: 0.01 X10*3/UL (ref 0–0.7)
EOSINOPHIL NFR BLD AUTO: 0.4 %
ERYTHROCYTE [DISTWIDTH] IN BLOOD BY AUTOMATED COUNT: 13 % (ref 11.5–14.5)
GLUCOSE SERPL-MCNC: 103 MG/DL (ref 74–99)
HCT VFR BLD AUTO: 28.3 % (ref 36–46)
HGB BLD-MCNC: 9.6 G/DL (ref 12–16)
IMM GRANULOCYTES # BLD AUTO: 0.01 X10*3/UL (ref 0–0.7)
IMM GRANULOCYTES NFR BLD AUTO: 0.4 % (ref 0–0.9)
LYMPHOCYTES # BLD AUTO: 0.31 X10*3/UL (ref 1.2–4.8)
LYMPHOCYTES NFR BLD AUTO: 11.9 %
MAGNESIUM SERPL-MCNC: 1.28 MG/DL (ref 1.6–2.4)
MCH RBC QN AUTO: 32.1 PG (ref 26–34)
MCHC RBC AUTO-ENTMCNC: 33.9 G/DL (ref 32–36)
MCV RBC AUTO: 95 FL (ref 80–100)
MONOCYTES # BLD AUTO: 0.31 X10*3/UL (ref 0.1–1)
MONOCYTES NFR BLD AUTO: 11.9 %
NEUTROPHILS # BLD AUTO: 1.95 X10*3/UL (ref 1.2–7.7)
NEUTROPHILS NFR BLD AUTO: 75 %
NRBC BLD-RTO: 0 /100 WBCS (ref 0–0)
PLATELET # BLD AUTO: 162 X10*3/UL (ref 150–450)
POTASSIUM SERPL-SCNC: 3.2 MMOL/L (ref 3.5–5.3)
PROT SERPL-MCNC: 6.6 G/DL (ref 6.4–8.2)
RAD ONC MSQ ACTUAL FRACTIONS DELIVERED: 29
RAD ONC MSQ ACTUAL SESSION DELIVERED DOSE: 200 CGRAY
RAD ONC MSQ ACTUAL TOTAL DOSE: 5800 CGRAY
RAD ONC MSQ ELAPSED DAYS: 41
RAD ONC MSQ LAST DATE: NORMAL
RAD ONC MSQ PRESCRIBED FRACTIONAL DOSE: 200 CGRAY
RAD ONC MSQ PRESCRIBED NUMBER OF FRACTIONS: 35
RAD ONC MSQ PRESCRIBED TECHNIQUE: NORMAL
RAD ONC MSQ PRESCRIBED TOTAL DOSE: 7000 CGRAY
RAD ONC MSQ PRESCRIPTION PATTERN COMMENT: NORMAL
RAD ONC MSQ START DATE: NORMAL
RAD ONC MSQ TREATMENT COURSE NUMBER: 1
RAD ONC MSQ TREATMENT SITE: NORMAL
RBC # BLD AUTO: 2.99 X10*6/UL (ref 4–5.2)
SODIUM SERPL-SCNC: 139 MMOL/L (ref 136–145)
WBC # BLD AUTO: 2.6 X10*3/UL (ref 4.4–11.3)

## 2025-04-28 PROCEDURE — 96361 HYDRATE IV INFUSION ADD-ON: CPT | Mod: INF

## 2025-04-28 PROCEDURE — 2500000004 HC RX 250 GENERAL PHARMACY W/ HCPCS (ALT 636 FOR OP/ED): Mod: JZ | Performed by: STUDENT IN AN ORGANIZED HEALTH CARE EDUCATION/TRAINING PROGRAM

## 2025-04-28 PROCEDURE — 96375 TX/PRO/DX INJ NEW DRUG ADDON: CPT | Mod: INF

## 2025-04-28 PROCEDURE — 77386 HC INTENSITY-MODULATED RADIATION THERAPY (IMRT), COMPLEX: CPT

## 2025-04-28 PROCEDURE — 83735 ASSAY OF MAGNESIUM: CPT

## 2025-04-28 PROCEDURE — 96413 CHEMO IV INFUSION 1 HR: CPT

## 2025-04-28 PROCEDURE — 99214 OFFICE O/P EST MOD 30 MIN: CPT | Performed by: NURSE PRACTITIONER

## 2025-04-28 PROCEDURE — 85025 COMPLETE CBC W/AUTO DIFF WBC: CPT

## 2025-04-28 PROCEDURE — 96368 THER/DIAG CONCURRENT INF: CPT

## 2025-04-28 PROCEDURE — 96367 TX/PROPH/DG ADDL SEQ IV INF: CPT

## 2025-04-28 PROCEDURE — 3078F DIAST BP <80 MM HG: CPT | Performed by: NURSE PRACTITIONER

## 2025-04-28 PROCEDURE — 80053 COMPREHEN METABOLIC PANEL: CPT

## 2025-04-28 PROCEDURE — 1126F AMNT PAIN NOTED NONE PRSNT: CPT | Performed by: NURSE PRACTITIONER

## 2025-04-28 PROCEDURE — 3074F SYST BP LT 130 MM HG: CPT | Performed by: NURSE PRACTITIONER

## 2025-04-28 PROCEDURE — 2500000004 HC RX 250 GENERAL PHARMACY W/ HCPCS (ALT 636 FOR OP/ED): Performed by: NURSE PRACTITIONER

## 2025-04-28 RX ORDER — DEXAMETHASONE 6 MG/1
12 TABLET ORAL ONCE
Status: COMPLETED | OUTPATIENT
Start: 2025-04-28 | End: 2025-04-28

## 2025-04-28 RX ORDER — ALBUTEROL SULFATE 0.83 MG/ML
3 SOLUTION RESPIRATORY (INHALATION) AS NEEDED
Status: DISCONTINUED | OUTPATIENT
Start: 2025-04-28 | End: 2025-04-28 | Stop reason: HOSPADM

## 2025-04-28 RX ORDER — DEXAMETHASONE 6 MG/1
12 TABLET ORAL ONCE
Status: CANCELLED | OUTPATIENT
Start: 2025-04-28 | End: 2025-04-28

## 2025-04-28 RX ORDER — PROCHLORPERAZINE EDISYLATE 5 MG/ML
10 INJECTION INTRAMUSCULAR; INTRAVENOUS EVERY 6 HOURS PRN
Status: DISCONTINUED | OUTPATIENT
Start: 2025-04-28 | End: 2025-04-28 | Stop reason: HOSPADM

## 2025-04-28 RX ORDER — DIPHENHYDRAMINE HYDROCHLORIDE 50 MG/ML
50 INJECTION, SOLUTION INTRAMUSCULAR; INTRAVENOUS AS NEEDED
Status: DISCONTINUED | OUTPATIENT
Start: 2025-04-28 | End: 2025-04-28 | Stop reason: HOSPADM

## 2025-04-28 RX ORDER — PALONOSETRON 0.05 MG/ML
0.25 INJECTION, SOLUTION INTRAVENOUS ONCE
Status: COMPLETED | OUTPATIENT
Start: 2025-04-28 | End: 2025-04-28

## 2025-04-28 RX ORDER — FAMOTIDINE 10 MG/ML
20 INJECTION, SOLUTION INTRAVENOUS ONCE AS NEEDED
Status: DISCONTINUED | OUTPATIENT
Start: 2025-04-28 | End: 2025-04-28 | Stop reason: HOSPADM

## 2025-04-28 RX ORDER — EPINEPHRINE 0.3 MG/.3ML
0.3 INJECTION SUBCUTANEOUS EVERY 5 MIN PRN
Status: DISCONTINUED | OUTPATIENT
Start: 2025-04-28 | End: 2025-04-28 | Stop reason: HOSPADM

## 2025-04-28 RX ORDER — PROCHLORPERAZINE MALEATE 10 MG
10 TABLET ORAL EVERY 6 HOURS PRN
Status: DISCONTINUED | OUTPATIENT
Start: 2025-04-28 | End: 2025-04-28 | Stop reason: HOSPADM

## 2025-04-28 RX ORDER — MAGNESIUM SULFATE HEPTAHYDRATE 40 MG/ML
2 INJECTION, SOLUTION INTRAVENOUS ONCE
Status: COMPLETED | OUTPATIENT
Start: 2025-04-28 | End: 2025-04-28

## 2025-04-28 RX ADMIN — FOSAPREPITANT 150 MG: 150 INJECTION, POWDER, LYOPHILIZED, FOR SOLUTION INTRAVENOUS at 09:52

## 2025-04-28 RX ADMIN — PALONOSETRON HYDROCHLORIDE 0.25 MG: 0.25 INJECTION INTRAVENOUS at 09:49

## 2025-04-28 RX ADMIN — CISPLATIN 54 MG: 1 INJECTION INTRAVENOUS at 10:39

## 2025-04-28 RX ADMIN — SODIUM CHLORIDE 1000 ML: 9 INJECTION, SOLUTION INTRAVENOUS at 11:49

## 2025-04-28 RX ADMIN — DEXAMETHASONE 12 MG: 6 TABLET ORAL at 08:41

## 2025-04-28 RX ADMIN — POTASSIUM CHLORIDE 999 ML/HR: 2 INJECTION, SOLUTION, CONCENTRATE INTRAVENOUS at 08:41

## 2025-04-28 RX ADMIN — MAGNESIUM SULFATE HEPTAHYDRATE 2 G: 40 INJECTION, SOLUTION INTRAVENOUS at 10:47

## 2025-04-28 ASSESSMENT — PAIN SCALES - GENERAL
PAINLEVEL_OUTOF10: 0-NO PAIN
PAINLEVEL_OUTOF10: 0-NO PAIN

## 2025-04-28 NOTE — PROGRESS NOTES
Subjective:   Patient Name: Ingris Mckinney    : 1955     MRN: [id number]     Age: 69 y.o.     Gender: female  Referring Provider: ARNAV    CC: Management of newly diagnosed L sided supraglottic squamous cell carcinoma ([id number])    Presenting History (3/13/2025): At time of initial presentation a 69 y.o. female, current smoker (started at age 19,1/3 pack per day) with a past medical history of osteoporosis, HLD, COPD (occasionally on O2) referred for management of suprglottic cancer.      She has been following with ENT for history of vocal cord paralysis without CT abnormality since , which was successfully managed with injection medialization.     She then developed acute worsening of her voice in 2024, with scope exam shortly after that showed mucosal irregularity along the anterior left false cord. The left true vocal cord was fixed in the paramedian position. The right true vocal cord exhibited normal movement. She was taken to the operating room for direct and biopsy on 2025. Operative findings showed a left false cord mass lesion which was not obstructive, and extended from anterior to posterior. The mass extended to the laryngeal ventricle on the left, but spared the true vocal cord. The mass did not appear to cross to the right. The vallecula, base of tongue and hypopharynx were normal. Biopsies from the left supraglottic mass were positive for invasive moderately differentiated squamous cell carcinoma.     CT neck on 2025 showed an enhancing mass in the left supraglottic larynx, 1.0 x 1.7 x 1.4 cm in size, which approached the epiglottis without definitive invasion. There was an area of irregularity/thickening on the right true vocal cord of unclear etiology.     2025: PET/CT: 1.  Focal intense hypermetabolic activity at the left aryepiglottic fold compatible with biopsy-proven squamous cell carcinoma. 2. No evidence of hypermetabolic kevin or distant metastatic  disease.3. Mild hypermetabolic activity associated with the right upper lobe pulmonary nodule. Continued attention on follow-up chest CT is recommended.    She was seen by ENT Dr. Lo and unfortunately was deemed not surgically resectable. She presented to [facility] for a consultation accompanied by her brother in law. She lives on her own. She states that she has been lazy over the past months and has lost a lot of weight. She doesn't really eat much food other than cereals. Luckily her sister and in-law brought her dinner. She has gained 12 lb in the past weeks. She denies any dysphagia or odynophagia. NO pain. NO numbness or tingling. No heating issues. No fever or chills. No n/v/d/c    Social Hx:   No etoh or illicit drugs.   2 sisters   No kids      Treatment Summary:  - March 18, 2025 C1D1 Cisplatin - weekly with concurrent RT     Current Treatment:  - March 18, 2025 C1D1 Cisplatin 40mg/m2 IV - weekly with concurrent RT   - March 25, 2025 C1D8 Cisplatin 40mg/m2 IV  - March 31, 2025 C1D15 Cisplatin 40mg/m2 IV  - April 7, 2025 C1D22 Cisplatin 40mg/m2 IV  - April 14, 2025 C1D29 Cisplatin 40mg/m2 IV  - April 21, 2025 C1D36 Cisplatin 40mg/m2 IV  - April 28, 2025 C1D43 Cisplatin 40mg/m2 IV     Interval History (4/28/2025):    Patient presents today for toxicity check and treatment readiness visit. She is tolerating Cisplatin very well. She is alone at today's visit. She denies any fevers, chills or night sweats. + cough chronic COPD, no chest pain, JONES interferes with ADLs. On home oxygen has portable oxygen concentrator. No nausea or vomiting. Increased throat pain. + dysphagia. Now eating for pleasure only. Mainly liquids at this time. Have encouraged hydration via g-tube. Weight loss noted this week. She reports sleeping most of weekend little intake and has not utilized g-tube to date. Denies constipation or diarrhea. No urinary symptoms. No rash. No neuropathy. Denies hearing changes or tinnitus. No further  facial swelling.      G-tube placed 4/3/2025    Review of Systems:  A review of systems has been completed and are negative for complaints except what is stated in the HPI and/or past medical history      Medical History:   Past Medical History:   Diagnosis Date    Anxiety     At risk for falling     COPD (chronic obstructive pulmonary disease) (Multi)     Hypercholesteremia     Hypoxia     Larynx cancer (Multi)     Malnutrition (Multi)     Personal history of other diseases of the respiratory system     History of chronic obstructive lung disease    Respiratory failure (Multi)     Tachycardia     Weakness        Surgical History:   Past Surgical History:   Procedure Laterality Date    CT GUIDED IMAGING FOR NEEDLE PLACEMENT  05/05/2020    CT GUIDED IMAGING FOR NEEDLE PLACEMENT [facility] CLINICAL LEGACY    LARYNGOSCOPY      direct w/ bx    OTHER SURGICAL HISTORY  11/11/2022    Hip surgery    OTHER SURGICAL HISTORY  11/11/2022    Arm surgery    OTHER SURGICAL HISTORY  11/11/2022    Leg surgery       Family History:  Family History   Problem Relation Name Age of Onset    Other (chronic lung disease) Other         Allergies:  Allergies   Allergen Reactions    Scallops Anaphylaxis    Iodinated Contrast Media Itching       Meds (Current):  Current Medications[1]      Pain Assessment:  Pain is: 0    Performance Status (ECOG): 2  ECOG Definition  0 Fully active; no performance restrictions.  1 Strenuous physical activity restricted; fully ambulatory and able to carry out light work.  2 Capable of all self-care but unable to carry out any work activities. Up and about >50% of waking hours.  3 Capable of only limited self-care; confined to bed or chair >50% of waking hours.  4 Completely disabled; cannot carry out any self-care; totally confined to bed or chair.  Exam:    Vital Signs:  /64 (BP Location: Right arm, Patient Position: Sitting)   Pulse (!) 112   Temp 36.5 °C (97.7 °F) (Temporal)   Resp 18   Wt (!) 39.8 kg (87  lb 13.7 oz)   SpO2 96%   BMI 16.17 kg/m²     Wt Readings from Last 5 Encounters:   25 (!) 39.8 kg (87 lb 13.7 oz)   25 (!) 39.8 kg (87 lb 13.7 oz)   25 (!) 43.6 kg (96 lb 0.2 oz)   25 (!) 42.5 kg (93 lb 12.9 oz)   25 (!) 41.7 kg (91 lb 14.9 oz)         Physical Exam:  ECO  Pain: 1  Constitutional: Well developed, awake/alert/oriented x3, no distress, alert and cooperative  Eyes: PER. sclera anicteric  ENMT: Oral mucosa moist, no lesions or thrush identified  Respiratory/Thorax: Breathing is non-labored. Lungs are clear to auscultation bilaterally. No adventitious breath sounds  Cardiovascular: S1-S2. Regular rate and rhythm. No murmurs, rubs, or gallops appreciated  Gastrointestinal: Abdomen soft nontender, nondistended, normal active bowel sounds.  Musculoskeletal: ROM intact, no joint swelling, normal strength  Extremities: normal extremities, no cyanosis, no edema, no clubbing  Lymphatics: no palpable lymphadenopathy   Skin: no rash  Neurologic: alert and oriented x3. Nonfocal exam. No myoclonus  Psychological: Pleasant, appropriate and easily engaged     Labs:  Lab Results   Component Value Date    WBC 2.6 (L) 2025    HGB 9.6 (L) 2025    HCT 28.3 (L) 2025    MCV 95 2025     2025      Lab Results   Component Value Date    NEUTROABS 1.95 2025      Lab Results   Component Value Date    GLUCOSE 103 (H) 2025    CALCIUM 8.6 2025     2025    K 3.2 (L) 2025    CO2 30 2025    CL 98 2025    BUN 10 2025    CREATININE 0.43 (L) 2025    MG 1.28 (L) 2025     Lab Results   Component Value Date    ALT 14 2025    AST 17 2025    ALKPHOS 43 2025    BILITOT 0.4 2025      Lab Results   Component Value Date    TSH 4.09 (H) 2025    FREET4 1.06 2025       Imaging:  No new imaging        Assessment/Plan:   69 y.o. female with past medical history as stated referred  for management of supraglottic squamous cell carcinoma.    The patient's records and imaging have been reviewed in detail.  I have discussed with the patient the natural history of their disease at length.  The following has also been discussed with the patient:    # Cancer:   L sided supraglottic squamous cell carcinoma ([id number]). Given that she is not surgically resectable. I thus recommend treatment of weekly cisplatin with concurrent RT for 7 weeks. Side effects of chenotherapy including but not limited to nausea, vomiting, diarrhea, anemia, thrombocytopenia,  leukopenia, neutropenic fever, kidney toxicities, liver toxicities, rash, infusion related reaction, secondary malignancies MDS or AML, and fertility impact, neuropathy and hearing impact. Consent signed. To start next week.     - Interval Imagin2025: PET/CT: 1.  Focal intense hypermetabolic activity at the left aryepiglottic fold compatible with biopsy-proven squamous cell carcinoma. 2. No evidence of hypermetabolic kevin or distant metastatic disease.3. Mild hypermetabolic activity associated with the right upper lobe pulmonary nodule. Continued attention on follow-up chest CT is recommended.    # Pulmonary nodule: will continue to follow.    # Clinical Trials:  None currently.     # Access: Peripheral veins. Will likely require Mediport.     # Pain: No acute pain.    # Bone Health: No signs of bony disease.     # Psychosocial: Coping well, no acute issues.  Good support from family & friends.  Social work support offered.  **OKAY to discuss treatment plans with sister.     # Hearing: NO baseline hearing issues.    # Speech and swallow: Order for PEG Tube placement   Has been sent to GI (will not put PEG in), General Surgery (consult appt ) and [facility] (no longer places PEGs)  Call out to IR at [facility] and social work will assist in transportation.   4/3/2025: G-tube was placed at [facility]  2025: Weight loss identified this  week. Increased dysphagia and pain. Will need to start tube feeds. Soft food for pleasure.  4/21/2025: 2 Boost/Ensure daily. Encouraged use of tube feed.   4/28/2025: Weight loss identified over past week. Encouraged tube feed. Continue to follow with dietitian.     # GI/CINV: severe malnutrition. Nutrition following.    # Smoking: N/A, current smoker, working on quitting.    # Fertility: N/A.  Oncofertility support available as needed.      # Heme/ESAs: N/A.    #JONES: Has home oxygen ([employer]). Portable concentrator ordered. Oxygen 93% RA at rest. 88% walking short distance with assistance. 97% at rest on 2L via NC.     # GOC: Patient is aware of curative intent goals of care.    # Language: English.    # Interdisciplinary Patient/Family Education Record:  Learner:  Patient.  Learning Needs Reviewed:  Treatments, Medications, Disease Process, Diagnostic Tests.  Barriers: None.  Teaching Method: Discussion, Handout(s).  Comprehension:  Verbalized Understanding.  Follow-up Plan:  Return to office as per MD.    # Dispo:   RTC in 1 week   Final dose cisplatin today  Hydration scheduled for Thursdays   Plan for weekly lab check and IV fluid +/-electrolytes         Michael Fregoso, APRN-CNP  [facility]  Thoracic + H&N Medical Oncology     I spent a total of 35+ minutes on the date of the service which included preparing to see the patient, face-to-face patient care, completing clinical documentation, obtaining and / or reviewing separately obtained history, counseling and educating the patient/family/caregiver, ordering medications, tests, or procedures, communicating with other healthcare providers (not separately reported), independently interpreting results, not separately reported, and communicating results to the patient/family/caregiver, Name and date of birth verified.                [1]   Current Outpatient Medications:     albuterol 2.5 mg /3 mL (0.083 %) nebulizer solution, Take 3 mL (2.5 mg) by  nebulization every 4 hours if needed for wheezing., Disp: , Rfl:     albuterol 90 mcg/actuation inhaler, Inhale 2 puffs every 4 hours if needed., Disp: , Rfl:     alendronate (Fosamax) 70 mg tablet, Take 1 tablet (70 mg) by mouth every 7 days. Take in the morning with a full glass of water, on an empty stomach, and do not take anything else by mouth or lie down for the next 30 min., Disp: 12 tablet, Rfl: 3    dexAMETHasone (Decadron) 4 mg tablet, Take 2 tablets (8 mg) by mouth once daily. For 3 days per week starting the day after treatment., Disp: 42 tablet, Rfl: 0    Dulera 200-5 mcg/actuation inhaler, Inhale 2 puffs twice a day., Disp: , Rfl:     OLANZapine (ZyPREXA) 5 mg tablet, Take 1 tablet (5 mg) by mouth once daily at bedtime., Disp: 30 tablet, Rfl: 1    ondansetron (Zofran) 8 mg tablet, Take 1 tablet (8 mg) by mouth every 8 hours if needed for nausea or vomiting., Disp: 30 tablet, Rfl: 5    prochlorperazine (Compazine) 10 mg tablet, Take 1 tablet (10 mg) by mouth every 6 hours if needed for nausea or vomiting., Disp: 30 tablet, Rfl: 5    rosuvastatin (Crestor) 10 mg tablet, Take 1 tablet (10 mg) by mouth once daily., Disp: 90 tablet, Rfl: 2    umeclidinium (Incruse Ellipta) 62.5 mcg/actuation inhalation, Inhale 1 puff (62.5 mcg) once daily., Disp: , Rfl:

## 2025-04-28 NOTE — PROGRESS NOTES
Patient here for follow up visit squamous cell supraglottitis. Peg tube in place, patient refuses to use tube. She does have boost available. She admits to not eating at times. She is noted to have lost 6 lbs. Patient ws sen and assessed by Michael Fregoso  NP in infusion, aware of intake concerns.  Angelique Sinha, dietitian also made aware of continued weight loss and no  use of peg at this time.      Patient educated on the importance of eating or using the peg for support. She denies fear of use of the peg, stating I just slept a lot this weekend. She was reminded that not having enough reserves from not eating will hinder her healing and make things harder, she feels she does not struggle with eating and would prefer to eat vs use the Peg. She agrees to drinking 2 ensure during infusion today. She was given 2 ensure, one over ice and she did start drinking it. She declined review and education on the use of the peg tube and denies fear of using the tube at this time.      Patient here alone    Medications and Allergies reviewed and reconciled this visit.    Follow up per MD request.    Patient reports availability and use of mychart, Reviewed this is a good place to communicate with the team as well as review labs and upcoming orders.      No barriers to education noted, patient agrees to current plan and verbalized understanding using teach back method.

## 2025-04-28 NOTE — PROGRESS NOTES
Patient was seen today by Michael GARCIA CNP prior to treatment. RN notified team of 6 lb weight loss since Thursday as patient still hasn't used PEG tube. Post treatment hydration will include an additional 2 grams of magnesium. Patient refused AVS printout. Patient brought back to waiting room and checked in for radiation appointment. Patient without any further questions or needs at this time.

## 2025-04-29 ENCOUNTER — HOSPITAL ENCOUNTER (OUTPATIENT)
Dept: RADIATION ONCOLOGY | Facility: CLINIC | Age: 70
Setting detail: RADIATION/ONCOLOGY SERIES
Discharge: HOME | End: 2025-04-29
Payer: MEDICARE

## 2025-04-29 ENCOUNTER — APPOINTMENT (OUTPATIENT)
Dept: RADIATION ONCOLOGY | Facility: CLINIC | Age: 70
End: 2025-04-29
Payer: MEDICARE

## 2025-04-29 ENCOUNTER — TELEPHONE (OUTPATIENT)
Dept: PALLIATIVE MEDICINE | Facility: CLINIC | Age: 70
End: 2025-04-29
Payer: MEDICARE

## 2025-04-29 DIAGNOSIS — Z51.0 ENCOUNTER FOR ANTINEOPLASTIC RADIATION THERAPY: ICD-10-CM

## 2025-04-29 DIAGNOSIS — C32.8 MALIGNANT NEOPLASM OF OVERLAPPING SITES OF LARYNX (MULTI): ICD-10-CM

## 2025-04-29 LAB
RAD ONC MSQ ACTUAL FRACTIONS DELIVERED: 30
RAD ONC MSQ ACTUAL SESSION DELIVERED DOSE: 200 CGRAY
RAD ONC MSQ ACTUAL TOTAL DOSE: 6000 CGRAY
RAD ONC MSQ ELAPSED DAYS: 42
RAD ONC MSQ LAST DATE: NORMAL
RAD ONC MSQ PRESCRIBED FRACTIONAL DOSE: 200 CGRAY
RAD ONC MSQ PRESCRIBED NUMBER OF FRACTIONS: 35
RAD ONC MSQ PRESCRIBED TECHNIQUE: NORMAL
RAD ONC MSQ PRESCRIBED TOTAL DOSE: 7000 CGRAY
RAD ONC MSQ PRESCRIPTION PATTERN COMMENT: NORMAL
RAD ONC MSQ START DATE: NORMAL
RAD ONC MSQ TREATMENT COURSE NUMBER: 1
RAD ONC MSQ TREATMENT SITE: NORMAL

## 2025-04-29 PROCEDURE — 77336 RADIATION PHYSICS CONSULT: CPT

## 2025-04-29 PROCEDURE — 77386 HC INTENSITY-MODULATED RADIATION THERAPY (IMRT), COMPLEX: CPT

## 2025-04-29 NOTE — TELEPHONE ENCOUNTER
Patient was scheduled to be seen in Supportive Oncology/Palliative Care clinic today by Fallon Hope CNP. Patient did not show up for appointment. Patient is almost done with RT and is not on any pain medications so may not need appointment. I will call later this week to check in if no call from patient.

## 2025-04-30 ENCOUNTER — APPOINTMENT (OUTPATIENT)
Dept: RADIATION ONCOLOGY | Facility: CLINIC | Age: 70
End: 2025-04-30
Payer: MEDICARE

## 2025-04-30 ENCOUNTER — HOSPITAL ENCOUNTER (OUTPATIENT)
Dept: RADIATION ONCOLOGY | Facility: CLINIC | Age: 70
Setting detail: RADIATION/ONCOLOGY SERIES
Discharge: HOME | End: 2025-04-30
Payer: MEDICARE

## 2025-04-30 DIAGNOSIS — Z51.0 ENCOUNTER FOR ANTINEOPLASTIC RADIATION THERAPY: ICD-10-CM

## 2025-04-30 DIAGNOSIS — C32.8 MALIGNANT NEOPLASM OF OVERLAPPING SITES OF LARYNX (MULTI): ICD-10-CM

## 2025-04-30 LAB
RAD ONC MSQ ACTUAL FRACTIONS DELIVERED: 31
RAD ONC MSQ ACTUAL SESSION DELIVERED DOSE: 200 CGRAY
RAD ONC MSQ ACTUAL TOTAL DOSE: 6200 CGRAY
RAD ONC MSQ ELAPSED DAYS: 43
RAD ONC MSQ LAST DATE: NORMAL
RAD ONC MSQ PRESCRIBED FRACTIONAL DOSE: 200 CGRAY
RAD ONC MSQ PRESCRIBED NUMBER OF FRACTIONS: 35
RAD ONC MSQ PRESCRIBED TECHNIQUE: NORMAL
RAD ONC MSQ PRESCRIBED TOTAL DOSE: 7000 CGRAY
RAD ONC MSQ PRESCRIPTION PATTERN COMMENT: NORMAL
RAD ONC MSQ START DATE: NORMAL
RAD ONC MSQ TREATMENT COURSE NUMBER: 1
RAD ONC MSQ TREATMENT SITE: NORMAL

## 2025-04-30 PROCEDURE — 77386 HC INTENSITY-MODULATED RADIATION THERAPY (IMRT), COMPLEX: CPT

## 2025-05-01 ENCOUNTER — HOSPITAL ENCOUNTER (OUTPATIENT)
Dept: RADIATION ONCOLOGY | Facility: CLINIC | Age: 70
Setting detail: RADIATION/ONCOLOGY SERIES
Discharge: HOME | End: 2025-05-01
Payer: MEDICARE

## 2025-05-01 ENCOUNTER — APPOINTMENT (OUTPATIENT)
Dept: RADIATION ONCOLOGY | Facility: CLINIC | Age: 70
End: 2025-05-01
Payer: MEDICARE

## 2025-05-01 ENCOUNTER — INFUSION (OUTPATIENT)
Dept: HEMATOLOGY/ONCOLOGY | Facility: CLINIC | Age: 70
End: 2025-05-01
Payer: MEDICARE

## 2025-05-01 ENCOUNTER — NUTRITION (OUTPATIENT)
Dept: HEMATOLOGY/ONCOLOGY | Facility: CLINIC | Age: 70
End: 2025-05-01
Payer: MEDICARE

## 2025-05-01 VITALS
OXYGEN SATURATION: 92 % | RESPIRATION RATE: 18 BRPM | DIASTOLIC BLOOD PRESSURE: 60 MMHG | BODY MASS INDEX: 16.86 KG/M2 | HEART RATE: 102 BPM | TEMPERATURE: 98.4 F | WEIGHT: 91.6 LBS | SYSTOLIC BLOOD PRESSURE: 103 MMHG

## 2025-05-01 VITALS
RESPIRATION RATE: 18 BRPM | BODY MASS INDEX: 16.88 KG/M2 | WEIGHT: 91.71 LBS | DIASTOLIC BLOOD PRESSURE: 68 MMHG | OXYGEN SATURATION: 92 % | TEMPERATURE: 97 F | SYSTOLIC BLOOD PRESSURE: 117 MMHG | HEART RATE: 103 BPM

## 2025-05-01 DIAGNOSIS — C32.8 MALIGNANT NEOPLASM OF OVERLAPPING SITES OF LARYNX (MULTI): ICD-10-CM

## 2025-05-01 DIAGNOSIS — Z51.0 ENCOUNTER FOR ANTINEOPLASTIC RADIATION THERAPY: ICD-10-CM

## 2025-05-01 DIAGNOSIS — C32.1 PRIMARY SQUAMOUS CELL CARCINOMA OF SUPRAGLOTTIS: ICD-10-CM

## 2025-05-01 LAB
RAD ONC MSQ ACTUAL FRACTIONS DELIVERED: 32
RAD ONC MSQ ACTUAL SESSION DELIVERED DOSE: 200 CGRAY
RAD ONC MSQ ACTUAL TOTAL DOSE: 6400 CGRAY
RAD ONC MSQ ELAPSED DAYS: 44
RAD ONC MSQ LAST DATE: NORMAL
RAD ONC MSQ PRESCRIBED FRACTIONAL DOSE: 200 CGRAY
RAD ONC MSQ PRESCRIBED NUMBER OF FRACTIONS: 35
RAD ONC MSQ PRESCRIBED TECHNIQUE: NORMAL
RAD ONC MSQ PRESCRIBED TOTAL DOSE: 7000 CGRAY
RAD ONC MSQ PRESCRIPTION PATTERN COMMENT: NORMAL
RAD ONC MSQ START DATE: NORMAL
RAD ONC MSQ TREATMENT COURSE NUMBER: 1
RAD ONC MSQ TREATMENT SITE: NORMAL

## 2025-05-01 PROCEDURE — 96360 HYDRATION IV INFUSION INIT: CPT | Mod: INF

## 2025-05-01 PROCEDURE — 77386 HC INTENSITY-MODULATED RADIATION THERAPY (IMRT), COMPLEX: CPT

## 2025-05-01 PROCEDURE — 2500000004 HC RX 250 GENERAL PHARMACY W/ HCPCS (ALT 636 FOR OP/ED): Mod: JZ | Performed by: STUDENT IN AN ORGANIZED HEALTH CARE EDUCATION/TRAINING PROGRAM

## 2025-05-01 RX ORDER — HEPARIN 100 UNIT/ML
500 SYRINGE INTRAVENOUS AS NEEDED
OUTPATIENT
Start: 2025-05-01

## 2025-05-01 RX ORDER — HEPARIN SODIUM,PORCINE/PF 10 UNIT/ML
50 SYRINGE (ML) INTRAVENOUS AS NEEDED
OUTPATIENT
Start: 2025-05-01

## 2025-05-01 RX ADMIN — SODIUM CHLORIDE 1000 ML: 0.9 INJECTION, SOLUTION INTRAVENOUS at 13:38

## 2025-05-01 ASSESSMENT — PAIN SCALES - GENERAL
PAINLEVEL_OUTOF10: 0-NO PAIN
PAINLEVEL_OUTOF10: 0-NO PAIN

## 2025-05-01 NOTE — PROGRESS NOTES
Radiation Oncology On Treatment Visit    Patient Name:  Ingris Mckinney  MRN:  [id number]  :  1955    Referring Provider: No ref. provider found  Primary Care Provider: Marge Camacho MD  Care Team: Patient Care Team:  Marge Camacho MD as PCP - General (Internal Medicine)  Ngoc Selby MD as PCP - [employer] PCP  Noemy Rivas DO as Radiation Oncologist (Radiation Oncology)  Tamiko Mahoney RN as Registered Nurse (Radiation Therapy)  Daphney Loo RN as Nurse Navigator (Hematology and Oncology)  Kimmie Waters MD MPH as Consulting Physician (Hematology and Oncology)  RADHA Mendoza-CNP as Nurse Practitioner (Hematology and Oncology)    Date of Service: 2025     Diagnosis:   Specialty Problems          Radiation Oncology Problems    Primary squamous cell carcinoma of supraglottis         Treatment Summary:  Radiation Therapy    Treatment Period Technique Fraction Dose Fractions Total Dose   Course 1 3/18/2025-2025  (days elapsed: 44)         H&N 3/18/2025-2025 VMAT 200 / 200 cGy 32 / 35 6,400 / 7,000 cGy     SUBJECTIVE: Tolerating as expected, and taking 2 Boosts + solid foods. Denies any odynophagia. Using baking soda rinses. Has some dysgeusia but not significantly worsened. Continues to have thickened secretions which are mild. Some dry dermatitis along neck, using cream now.      OBJECTIVE:   Vital Signs:  /60   Pulse 102   Temp 36.9 °C (98.4 °F)   Resp 18   Wt (!) 41.6 kg (91 lb 9.6 oz)   SpO2 92%   BMI 16.86 kg/m²     Daily Weight  25 : (!) 41.6 kg (91 lb 9.6 oz)  25 : (!) 39.8 kg (87 lb 13.7 oz)  25 : (!) 39.8 kg (87 lb 13.7 oz)  25 : (!) 43.6 kg (96 lb 0.2 oz)  25 : (!) 42.5 kg (93 lb 12.9 oz)  25 : (!) 41.7 kg (91 lb 14.9 oz)  25 : (!) 41.7 kg (91 lb 14.9 oz)  25 : (!) 42.5 kg (93 lb 11.1 oz)  25 : (!) 42 kg (92 lb 7.7 oz)  25 : (!) 41.3 kg (90 lb 15 oz)     Other Pertinent Findings:      Toxicity Assessment          3/20/2025    10:44 3/20/2025    10:46 3/27/2025    11:14 4/4/2025    13:06 4/10/2025    12:45 4/17/2025    12:50 4/24/2025    13:11   Toxicity Assessment   Treatment  and neck  Head and neck Head and neck Head and neck Head and neck Head and neck   Anorexia Grade 0       eating regular food plus 2 anna calorie Boosts/day Grade 0       drinking 2 high calorie Boosts/day and eating regular foods Grade 0       Drinking 2 high protein shakes/day and eating what she wants Grade 1       Eating soft foods and drinking 1-2 high calorie boosts/day. Patient just had Peg placed yesterday Grade 0       drinking 2 boost plus eating Grade 1       eating the meals provided and 2 cans of Boost/day Grade 1       eating frozen meals and drinking 2 boosts/day   Anxiety  Grade 1 Grade 0 Grade 0 Grade 0 Grade 1 Grade 1   Dehydration  Grade 0 Grade 0 Grade 0 Grade 0 Grade 0 Grade 0   Depression  Grade 0 Grade 0 Grade 0 Grade 0 Grade 0 Grade 1   Dermatitis Radiation  Grade 0 Grade 0 Grade 0 Grade 1       some redness Grade 0 Grade 1       dry skin   Diarrhea  Grade 1       with anxiety Grade 0 Grade 0 Grade 0 Grade 0 Grade 0   Fatigue  Grade 1 Grade 1       naps during the day Grade 1       naps during the day Grade 1       taking naps, going to bed early Grade 1       naps during the day Grade 1   Fibrosis Deep Connective Tissue     Grade 0     Fracture     Grade 0     Nausea  Grade 0 Grade 0 Grade 0 Grade 0 Grade 0 Grade 0   Pain  Grade 0 Grade 0 Grade 2       pain level 8 to abdomen--from Peg placement Grade 0 Grade 0 Grade 0   Treatment Related Secondary Malignancy     Grade 0     Tumor Pain     Grade 0     Vomiting  Grade 0 Grade 0 Grade 0 Grade 0 Grade 0 Grade 0   Dysphagia  Grade 0 Grade 0 Grade 0 Grade 0 Grade 0 Grade 1   Esophagitis  Grade 0 Grade 0 Grade 1 Grade 0 Grade 0 Grade 1       occasional   Mucositis Oral  Grade 0       using salt and soda rinse and Blis lozenges Grade 0 Grade 0  Grade 0       using baking soda and salt rinse, blis lozenges Grade 0 Grade 0   Hearing Impaired  Grade 0 Grade 0 Grade 0 Grade 0 Grade 0 Grade 0   Blurred Vision  Grade 0 Grade 0 Grade 0 Grade 0 Grade 0 Grade 0   Dry Eye  Grade 0 Grade 0 Grade 0 Grade 0 Grade 0 Grade 0   Eye Pain  Grade 0 Grade 0 Grade 0 Grade 0 Grade 0 Grade 0   Retinopathy     Grade 0     Dry Mouth  Grade 0 Grade 1 Grade 0 Grade 1 Grade 1 Grade 1   Pneumonitis     Grade 0     Ear Pain  Grade 0 Grade 0 Grade 0 Grade 0 Grade 0 Grade 0   External Ear Pain  Grade 0 Grade 0 Grade 0 Grade 0 Grade 0 Grade 0   Tinnitus  Grade 0 Grade 0 Grade 0 Grade 0 Grade 0 Grade 0   Watering Eyes     Grade 0 Grade 0 Grade 0   Oral Pain  Grade 0 Grade 0 Grade 0 Grade 0 Grade 0 Grade 0   Salivary Duct Inflammation   Grade 0 Grade 0 Grade 0 Grade 1 Grade 1   Edema Face  Grade 0 Grade 0  Grade 0 Grade 0 Grade 0   Malaise     Grade 0     Neck Edema   Grade 0 Grade 1       new. started early this am Grade 0 Grade 0 Grade 0   Head Soft Tissue Necrosis     Grade 0     Neck Soft Tissue Necrosis     Grade 0     Osteonecrosis of Jaw     Grade 0     Superficial Soft Tissue Fibrosis     Grade 0     Trismus  Grade 0 Grade 0 Grade 0 Grade 0 Grade 0 Grade 0   Dysarthria  Grade 0 Grade 0 Grade 0 Grade 0 Grade 0 Grade 0   Dysesthesia  Grade 0 Grade 0 Grade 0 Grade 0 Grade 0 Grade 0   Dysgeusia  Grade 0 Grade 0 Grade 0 Grade 0 Grade 1 Grade 0   Facial Nerve Disorder     Grade 0     Hypoglossal Nerve Disorder     Grade 0     Oculomotor Nerve Disorder     Grade 0     Paresthesia     Grade 0     Stroke     Grade 0     Transient Ischemic Attacks     Grade 0     Trigeminal Nerve Disorder     Grade 0     Aspiration  Grade 0 Grade 0 Grade 0 Grade 0 Grade 0    Hoarseness  Grade 2 Grade 1       baseline Grade 3 Grade 3 Grade 3 Grade 2   Laryngeal Edema     Grade 0     Stridor     Grade 0     Tracheal Mucositis     Grade 0     Voice Alteration  Grade 2 Grade 1 Grade 2 Grade 2 Grade 3 Grade 2         Assessment / Plan:  The patient is tolerating radiation therapy as anticipated.  Continue per current treatment plan.

## 2025-05-01 NOTE — PROGRESS NOTES
"NUTRITION Follow Up NOTE    Nutrition Assessment     Reason for Visit:  Ingris Mckinney is a 69 y.o. female who presents for primary SCC supraglottis  Pt in for radiation consult, asked to see 2/2 dx, weight status, weight loss    TX: Cisplatin with concurrent radiation  Patient lives in [place] alone, Zulema, sister, lives in [place], other [facility]. Sisters help pt with buying groceries etc    Contact Zulema Mckinney [phone]    Her insurance company provides transportation    PEG placed 4/3/25  16 F legacy tube    5/1- FUV - with OTV. Pt had a bad weekend, slept a lot and was not keeping up with nutrition and hydration, she is back to taking ~ 2 Boost VHC/day and a frozen meal. RT completes 5/6 - so 3 more treatments.      Patient Active Problem List   Diagnosis    Acute on chronic respiratory failure with hypoxia and hypercapnia    Anxiety    Balance disorder    Blood loss anemia    Closed fracture of pelvis (Multi)    Closed fracture of right inferior pubic ramus    Degenerative joint disease (DJD) of hip    Depression    Essential hypertension    ETOH abuse    Hyperlipidemia    Hypoxemia    Centrilobular emphysema (Multi)    COPD with exacerbation (Multi)    Severe protein-calorie malnutrition (Multi)    Lesion of larynx    Primary squamous cell carcinoma of supraglottis       Nutrition Significant Labs:  Lab Results   Component Value Date/Time    GLUCOSE 103 (H) 04/28/2025 0741     04/28/2025 0741    K 3.2 (L) 04/28/2025 0741    CL 98 04/28/2025 0741    CO2 30 04/28/2025 0741    ANIONGAP 14 04/28/2025 0741    BUN 10 04/28/2025 0741    CREATININE 0.43 (L) 04/28/2025 0741    EGFR >90 04/28/2025 0741    CALCIUM 8.6 04/28/2025 0741    ALBUMIN 4.0 04/28/2025 0741    ALKPHOS 43 04/28/2025 0741    PROT 6.6 04/28/2025 0741    AST 17 04/28/2025 0741    BILITOT 0.4 04/28/2025 0741    ALT 14 04/28/2025 0741    MG 1.28 (L) 04/28/2025 0741     No results found for: \"VITD25\"      Anthropometrics:             " "            Daily Weight  05/01/25 : (!) 41.6 kg (91 lb 9.6 oz)  04/28/25 : (!) 39.8 kg (87 lb 13.7 oz)  04/28/25 : (!) 39.8 kg (87 lb 13.7 oz)  04/24/25 : (!) 43.6 kg (96 lb 0.2 oz)  04/24/25 : (!) 42.5 kg (93 lb 12.9 oz)  04/21/25 : (!) 41.7 kg (91 lb 14.9 oz)  04/21/25 : (!) 41.7 kg (91 lb 14.9 oz)  04/17/25 : (!) 42.5 kg (93 lb 11.1 oz)  04/17/25 : (!) 42 kg (92 lb 7.7 oz)  04/14/25 : (!) 41.3 kg (90 lb 15 oz)  04/14/25 : (!) 41.3 kg (90 lb 15 oz)  04/10/25 : (!) 44.4 kg (97 lb 15.9 oz)  04/07/25 : (!) 43.2 kg (95 lb 3.8 oz)  04/07/25 : (!) 43.2 kg (95 lb 2.1 oz)  04/04/25 : (!) 44.8 kg (98 lb 12.3 oz)  03/31/25 : (!) 43 kg (94 lb 12.8 oz)  03/31/25 : (!) 43 kg (94 lb 12.8 oz)  03/27/25 : 46.1 kg (101 lb 10.1 oz)  03/24/25 : (!) 42.8 kg (94 lb 7.5 oz)  03/24/25 : (!) 42.8 kg (94 lb 7.5 oz)     3/20- radonc wt 43.4kg / 95#  4/4- OTV wt 44.8 kg / 98#  4/14 - infusion 41.3 kg (90#)  4/21 - infusion 41.7 kg (91#)   5/1- OTV wt 41.6 kg (91#)     Weight Change %:  Weight History / % Weight Change: weight up / down few pounds but over all stable. Had gained weight prior to treatment. 3% weight loss over past month  Significant Weight Loss: No    Nutrition History:  Food & Nutrition History: no appetite, tired. \"Trying\" to take ONS + frozen meal  Food Allergies:  (scallops)  Food Intolerances:    Vitamin/mineral intake:       Herbal supplements:    Medication and Complementary/Alternative Medicine Use:    Dentition:    Sleep Habits:      Diet Recall:  Meal 1:    Snack 1:    Meal 2:    Snack 2:    Meal 3: frozen meal  Snack 3:    Food Variety:    Oral Nutrition Supplement Use: Oral Nutrition Supplements: Boost Very High Calorie Frequency: back to 2/day Calories: 530 each Protein: 22 each  Fluid Intake: water  Energy Intake: Fair 50-75 %, Good > 75 %    Food Preparation:  Cooking: Patient, Family  Grocery Shopping: Family  Dining Out:      Medications:  Current Outpatient Medications   Medication Instructions    albuterol " 90 mcg/actuation inhaler 2 puffs, Every 4 hours PRN    albuterol 2.5 mg, Every 4 hours PRN    alendronate (FOSAMAX) 70 mg, oral, Every 7 days, Take in the morning with a full glass of water, on an empty stomach, and do not take anything else by mouth or lie down for the next 30 min.    dexAMETHasone (DECADRON) 8 mg, oral, Daily, For 3 days per week starting the day after treatment.    Dulera 200-5 mcg/actuation inhaler 2 puffs, 2 times daily    OLANZapine (ZYPREXA) 5 mg, oral, Nightly    ondansetron (ZOFRAN) 8 mg, oral, Every 8 hours PRN    prochlorperazine (COMPAZINE) 10 mg, oral, Every 6 hours PRN    rosuvastatin (CRESTOR) 10 mg, oral, Daily    umeclidinium (Incruse Ellipta) 62.5 mcg/actuation inhalation 1 puff, Daily RT       Nutrition Focused Physical Exam Findings: 4/7/25    Subcutaneous Fat Loss:   Orbital Fat Pads: Severe (dark circles, hollowing and loose skin)  Buccal Fat Pads: Severe (hollow, sunken and narrow face)  Triceps: Severe (negligible fat tissue)  Ribs: Severe (depression between the ribs very apparent, iliac crest very prominent)    Muscle Wasting:  Temporalis: Severe (hollowed scooping depression)  Pectoralis (Clavicular Region): Severe (protruding prominent clavicle)  Deltoid/Trapezius: Severe (squared shoulders, acromion process prominent)  Interosseous: Severe (depressed area between thumb and forefinger)  Trapezius/Infraspinatus/Supraspinatus (Scapular Region): Severe (prominent visual scapula, depression between ribs, scapula or shoulder)  Quadriceps: Severe (depressions on inner and outer thigh)  Gastrocnemius: Severe (minimal muscle definition)    Physical Findings:  Hair: Negative  Eyes: Negative  Nails: Negative  Skin:  (reddened skin from radiation)  Digestive System Findings: Constipation (intermittent - Miralax when needed)  Mouth Findings:  (thick saliva)       Estimated Needs:          Total Energy Estimated Needs in 24 hours (kCal): 1425 kCal  Energy Estimated Needs per kg Body  "Weight in 24 hours (kCal/kg): 35 kCal/kg (4/14- recalculated based on current weight 90#)  Total Protein Estimated Needs in 24 Hours (g): 61 g  Protein Estimated Needs per kg Body Weight in 24 Hours (g/kg): 1.5 g/kg  Total Fluid Estimated Needs in 24 Hours (mL): 1425 mL  Method for Estimating 24 Hour Fluid Needs: 1 ml/kcal                       Nutrition Diagnosis   Malnutrition Diagnosis  Patient has Malnutrition Diagnosis: Yes  Diagnosis Status: Active  Malnutrition Diagnosis: Severe malnutrition related to chronic disease or condition  Related to: 9% weight loss over 1 month, poor oral intake, findings on NFPE (severe depletion fat and muscle stores)    Nutrition Diagnosis  Patient has Nutrition Diagnosis: Yes  Diagnosis Status (1): Active  Nutrition Diagnosis 1: Predicted inadequate energy intake  Related to (1): pathophsyiology of disease and treatment  As Evidenced by (1): anticipated nutrition impact symptoms affecting oral intake and weight  Additional Nutrition Diagnosis: Diagnosis 3  Diagnosis Status (2): Active  Nutrition Diagnosis 2: Increased energy expenditure  Related to (2): increased metabolic demand, disease process  As Evidenced by (2): cancer diagnosis, planned treatment  Diagnosis Status (3): Active  Nutrition Diagnosis 3: Disordered eating pattern  Related to (3): patients meal habits  As Evidenced by (3): pt not eating, caring for self, will not prepare food  Additional Assessment Information (3): 4/14 - intake down \"I mostly didn't try\"       Nutrition Interventions/Recommendations   Nutrition Prescription: Oral nutrition, Enteral nutrition soft/puree/liquid diet, high calorie high protein, management of NIS, enteral nutrition as needed    Nutrition Interventions:   Food and Nutrient Delivery: Meals & Snacks: Energy-modified diet, Fluid-modified diet, Modification of schedule of oral intake, Protein-modified diet, Texture-Modified Diet  Goals: Stressed importance of taking Boost VHC 2/day " everyday + frozen meal/1 meal per day and anything she can eat. If she cannot drink Boost VHC orally she can use through PEG, diluted slightly with water. She is flushing once daily. Encouraged adequate fluids  Enteral Nutrition:  (+ PEG. When indicated Isosource 1.5 (3.5) cartons/day provides 1312 valentina, 60 grams pro, 669 ml free water. She can  use Boost VHC in PEG diluted with water, this was explained to her. [facility] holding onto enteral orders until needed and pt meets criteria)  Medical Food Supplement: Commercial beverage medical food supplement therapy  Goals: Continue Boost VHC BID consistently, daily  Feeding Assistance Management: Mouth care management  Goals: +s/s rinse  Other:: fluid  Goals: use PEG for hydration, fluid as needed     Coordination of Care: Collaboration and referral of nutrition care:  (collaboration with other providers)  Goals: work with team to achieve best possible outcomes     DME: [facility]  Boost VHC BID  Provides 1060 calories and 44 grams protein  Comments   Enteral orders sent 4/3/25 Isosource 1.5 3.5 cartons per day to provide 1312 calories, 60 grams protein 669 ml free water  - [facility] holding enteral orders for time being  Pt receives O2 from [facility]      Nutrition Education:   Nutrition Education Content:            Nutrition Monitoring and Evaluation   Food and Nutrient Intake  Monitoring and Evaluation Plan: Energy intake, Meal/snack pattern, Protein intake, Fluid intake, Amount of food  Energy Intake: Estimated energy intake  Criteria: Meet >75% estimated energy needs- food recall  Fluid Intake: Estimated fluid intake  Criteria: maintain hydration  Amount of Food: Medical food intake  Criteria: ONS as recommeded  Meal/Snack Pattern: Estimated meal and snack pattern  Criteria: 2-4 small meals/snacks per day  Estimated protein intake: Estimated protein intake  Criteria: meet >75% estimated protein needs - food recall    Anthropometric measurements  Monitoring and Evaluation  Plan: Weight  Body Weight: Measured body weight  Criteria: maintain/gain weight         Nutrition Focused Physical Findings  Monitoring and Evaluation Plan: Adipose, Muscle, Digestive System  Adipose Finding: Loss of subcutaneous fat  Criteria: prevent further losses  Digestive System Finding: Constipation  Criteria: manage symptoms, adhere to regime  Muscle Finding: Muscle atrophy  Criteria: prevent further losses         Follow Up: as needed      Time Spent  Prep time on day of patient encounter: 5 minutes  Time spent directly with patient, family or caregiver: 15 minutes  Additional Time Spent on Patient Care Activities: 0 minutes  Documentation Time: 20 minutes  Other Time Spent: 0 minutes  Total: 40 minutes

## 2025-05-02 ENCOUNTER — APPOINTMENT (OUTPATIENT)
Dept: RADIATION ONCOLOGY | Facility: CLINIC | Age: 70
End: 2025-05-02
Payer: MEDICARE

## 2025-05-02 ENCOUNTER — HOSPITAL ENCOUNTER (OUTPATIENT)
Dept: RADIATION ONCOLOGY | Facility: CLINIC | Age: 70
Setting detail: RADIATION/ONCOLOGY SERIES
Discharge: HOME | End: 2025-05-02
Payer: MEDICARE

## 2025-05-02 DIAGNOSIS — Z51.0 ENCOUNTER FOR ANTINEOPLASTIC RADIATION THERAPY: ICD-10-CM

## 2025-05-02 DIAGNOSIS — C32.8 MALIGNANT NEOPLASM OF OVERLAPPING SITES OF LARYNX (MULTI): ICD-10-CM

## 2025-05-02 LAB
RAD ONC MSQ ACTUAL FRACTIONS DELIVERED: 33
RAD ONC MSQ ACTUAL SESSION DELIVERED DOSE: 200 CGRAY
RAD ONC MSQ ACTUAL TOTAL DOSE: 6600 CGRAY
RAD ONC MSQ ELAPSED DAYS: 45
RAD ONC MSQ LAST DATE: NORMAL
RAD ONC MSQ PRESCRIBED FRACTIONAL DOSE: 200 CGRAY
RAD ONC MSQ PRESCRIBED NUMBER OF FRACTIONS: 35
RAD ONC MSQ PRESCRIBED TECHNIQUE: NORMAL
RAD ONC MSQ PRESCRIBED TOTAL DOSE: 7000 CGRAY
RAD ONC MSQ PRESCRIPTION PATTERN COMMENT: NORMAL
RAD ONC MSQ START DATE: NORMAL
RAD ONC MSQ TREATMENT COURSE NUMBER: 1
RAD ONC MSQ TREATMENT SITE: NORMAL

## 2025-05-02 PROCEDURE — 77386 HC INTENSITY-MODULATED RADIATION THERAPY (IMRT), COMPLEX: CPT

## 2025-05-02 NOTE — TELEPHONE ENCOUNTER
Phone call to patient and VM left providing our call back number if patient is interested in rescheduling missed appointment on 4/29/25 (NPV with supportive oncology).

## 2025-05-05 ENCOUNTER — APPOINTMENT (OUTPATIENT)
Dept: PALLIATIVE MEDICINE | Facility: CLINIC | Age: 70
End: 2025-05-05
Payer: MEDICARE

## 2025-05-05 ENCOUNTER — APPOINTMENT (OUTPATIENT)
Dept: RADIATION ONCOLOGY | Facility: CLINIC | Age: 70
End: 2025-05-05
Payer: MEDICARE

## 2025-05-05 ENCOUNTER — HOSPITAL ENCOUNTER (OUTPATIENT)
Dept: RADIATION ONCOLOGY | Facility: CLINIC | Age: 70
Setting detail: RADIATION/ONCOLOGY SERIES
Discharge: HOME | End: 2025-05-05
Payer: MEDICARE

## 2025-05-05 DIAGNOSIS — C32.8 MALIGNANT NEOPLASM OF OVERLAPPING SITES OF LARYNX (MULTI): ICD-10-CM

## 2025-05-05 DIAGNOSIS — Z51.0 ENCOUNTER FOR ANTINEOPLASTIC RADIATION THERAPY: ICD-10-CM

## 2025-05-05 LAB
RAD ONC MSQ ACTUAL FRACTIONS DELIVERED: 34
RAD ONC MSQ ACTUAL SESSION DELIVERED DOSE: 200 CGRAY
RAD ONC MSQ ACTUAL TOTAL DOSE: 6800 CGRAY
RAD ONC MSQ ELAPSED DAYS: 48
RAD ONC MSQ LAST DATE: NORMAL
RAD ONC MSQ PRESCRIBED FRACTIONAL DOSE: 200 CGRAY
RAD ONC MSQ PRESCRIBED NUMBER OF FRACTIONS: 35
RAD ONC MSQ PRESCRIBED TECHNIQUE: NORMAL
RAD ONC MSQ PRESCRIBED TOTAL DOSE: 7000 CGRAY
RAD ONC MSQ PRESCRIPTION PATTERN COMMENT: NORMAL
RAD ONC MSQ START DATE: NORMAL
RAD ONC MSQ TREATMENT COURSE NUMBER: 1
RAD ONC MSQ TREATMENT SITE: NORMAL

## 2025-05-05 PROCEDURE — 77386 HC INTENSITY-MODULATED RADIATION THERAPY (IMRT), COMPLEX: CPT

## 2025-05-06 ENCOUNTER — INFUSION (OUTPATIENT)
Dept: HEMATOLOGY/ONCOLOGY | Facility: CLINIC | Age: 70
End: 2025-05-06
Payer: MEDICARE

## 2025-05-06 ENCOUNTER — HOSPITAL ENCOUNTER (OUTPATIENT)
Dept: RADIATION ONCOLOGY | Facility: CLINIC | Age: 70
Setting detail: RADIATION/ONCOLOGY SERIES
Discharge: HOME | End: 2025-05-06
Payer: MEDICARE

## 2025-05-06 ENCOUNTER — OFFICE VISIT (OUTPATIENT)
Dept: HEMATOLOGY/ONCOLOGY | Facility: CLINIC | Age: 70
End: 2025-05-06
Payer: MEDICARE

## 2025-05-06 ENCOUNTER — NUTRITION (OUTPATIENT)
Dept: HEMATOLOGY/ONCOLOGY | Facility: CLINIC | Age: 70
End: 2025-05-06

## 2025-05-06 VITALS
TEMPERATURE: 97 F | OXYGEN SATURATION: 93 % | WEIGHT: 88.85 LBS | RESPIRATION RATE: 18 BRPM | SYSTOLIC BLOOD PRESSURE: 103 MMHG | DIASTOLIC BLOOD PRESSURE: 60 MMHG | HEART RATE: 93 BPM | BODY MASS INDEX: 16.35 KG/M2

## 2025-05-06 DIAGNOSIS — Z51.0 ENCOUNTER FOR ANTINEOPLASTIC RADIATION THERAPY: ICD-10-CM

## 2025-05-06 DIAGNOSIS — R53.0 NEOPLASTIC MALIGNANT RELATED FATIGUE: ICD-10-CM

## 2025-05-06 DIAGNOSIS — C32.8 MALIGNANT NEOPLASM OF OVERLAPPING SITES OF LARYNX (MULTI): ICD-10-CM

## 2025-05-06 DIAGNOSIS — E83.42 HYPOMAGNESEMIA: ICD-10-CM

## 2025-05-06 DIAGNOSIS — C32.1 PRIMARY SQUAMOUS CELL CARCINOMA OF SUPRAGLOTTIS: ICD-10-CM

## 2025-05-06 DIAGNOSIS — E43 SEVERE PROTEIN-CALORIE MALNUTRITION (MULTI): ICD-10-CM

## 2025-05-06 DIAGNOSIS — E87.6 HYPOKALEMIA: ICD-10-CM

## 2025-05-06 DIAGNOSIS — C32.1 PRIMARY SQUAMOUS CELL CARCINOMA OF SUPRAGLOTTIS: Primary | ICD-10-CM

## 2025-05-06 LAB
ALBUMIN SERPL BCP-MCNC: 4.2 G/DL (ref 3.4–5)
ALP SERPL-CCNC: 43 U/L (ref 33–136)
ALT SERPL W P-5'-P-CCNC: 15 U/L (ref 7–45)
ANION GAP SERPL CALC-SCNC: 15 MMOL/L (ref 10–20)
AST SERPL W P-5'-P-CCNC: 21 U/L (ref 9–39)
BASOPHILS # BLD AUTO: 0.01 X10*3/UL (ref 0–0.1)
BASOPHILS NFR BLD AUTO: 0.3 %
BILIRUB SERPL-MCNC: 0.4 MG/DL (ref 0–1.2)
BUN SERPL-MCNC: 8 MG/DL (ref 6–23)
CALCIUM SERPL-MCNC: 8.6 MG/DL (ref 8.6–10.3)
CHLORIDE SERPL-SCNC: 95 MMOL/L (ref 98–107)
CO2 SERPL-SCNC: 33 MMOL/L (ref 21–32)
CREAT SERPL-MCNC: 0.49 MG/DL (ref 0.5–1.05)
EGFRCR SERPLBLD CKD-EPI 2021: >90 ML/MIN/1.73M*2
EOSINOPHIL # BLD AUTO: 0.01 X10*3/UL (ref 0–0.7)
EOSINOPHIL NFR BLD AUTO: 0.3 %
ERYTHROCYTE [DISTWIDTH] IN BLOOD BY AUTOMATED COUNT: 13.4 % (ref 11.5–14.5)
GLUCOSE SERPL-MCNC: 107 MG/DL (ref 74–99)
HCT VFR BLD AUTO: 30.3 % (ref 36–46)
HGB BLD-MCNC: 10.5 G/DL (ref 12–16)
IMM GRANULOCYTES # BLD AUTO: 0.01 X10*3/UL (ref 0–0.7)
IMM GRANULOCYTES NFR BLD AUTO: 0.3 % (ref 0–0.9)
LYMPHOCYTES # BLD AUTO: 0.47 X10*3/UL (ref 1.2–4.8)
LYMPHOCYTES NFR BLD AUTO: 16.3 %
MAGNESIUM SERPL-MCNC: 1.18 MG/DL (ref 1.6–2.4)
MCH RBC QN AUTO: 32.5 PG (ref 26–34)
MCHC RBC AUTO-ENTMCNC: 34.7 G/DL (ref 32–36)
MCV RBC AUTO: 94 FL (ref 80–100)
MONOCYTES # BLD AUTO: 0.38 X10*3/UL (ref 0.1–1)
MONOCYTES NFR BLD AUTO: 13.2 %
NEUTROPHILS # BLD AUTO: 2 X10*3/UL (ref 1.2–7.7)
NEUTROPHILS NFR BLD AUTO: 69.6 %
NRBC BLD-RTO: 0 /100 WBCS (ref 0–0)
PLATELET # BLD AUTO: 142 X10*3/UL (ref 150–450)
POTASSIUM SERPL-SCNC: 3.2 MMOL/L (ref 3.5–5.3)
PROT SERPL-MCNC: 6.7 G/DL (ref 6.4–8.2)
RAD ONC MSQ ACTUAL FRACTIONS DELIVERED: 35
RAD ONC MSQ ACTUAL SESSION DELIVERED DOSE: 200 CGRAY
RAD ONC MSQ ACTUAL TOTAL DOSE: 7000 CGRAY
RAD ONC MSQ ELAPSED DAYS: 49
RAD ONC MSQ LAST DATE: NORMAL
RAD ONC MSQ PRESCRIBED FRACTIONAL DOSE: 200 CGRAY
RAD ONC MSQ PRESCRIBED NUMBER OF FRACTIONS: 35
RAD ONC MSQ PRESCRIBED TECHNIQUE: NORMAL
RAD ONC MSQ PRESCRIBED TOTAL DOSE: 7000 CGRAY
RAD ONC MSQ PRESCRIPTION PATTERN COMMENT: NORMAL
RAD ONC MSQ START DATE: NORMAL
RAD ONC MSQ TREATMENT COURSE NUMBER: 1
RAD ONC MSQ TREATMENT SITE: NORMAL
RBC # BLD AUTO: 3.23 X10*6/UL (ref 4–5.2)
SODIUM SERPL-SCNC: 140 MMOL/L (ref 136–145)
WBC # BLD AUTO: 2.9 X10*3/UL (ref 4.4–11.3)

## 2025-05-06 PROCEDURE — 3074F SYST BP LT 130 MM HG: CPT | Performed by: NURSE PRACTITIONER

## 2025-05-06 PROCEDURE — 77386 HC INTENSITY-MODULATED RADIATION THERAPY (IMRT), COMPLEX: CPT

## 2025-05-06 PROCEDURE — 77336 RADIATION PHYSICS CONSULT: CPT

## 2025-05-06 PROCEDURE — 1126F AMNT PAIN NOTED NONE PRSNT: CPT | Performed by: NURSE PRACTITIONER

## 2025-05-06 PROCEDURE — 96366 THER/PROPH/DIAG IV INF ADDON: CPT | Mod: INF

## 2025-05-06 PROCEDURE — 80053 COMPREHEN METABOLIC PANEL: CPT

## 2025-05-06 PROCEDURE — 96368 THER/DIAG CONCURRENT INF: CPT

## 2025-05-06 PROCEDURE — 1159F MED LIST DOCD IN RCRD: CPT | Performed by: NURSE PRACTITIONER

## 2025-05-06 PROCEDURE — 99214 OFFICE O/P EST MOD 30 MIN: CPT | Mod: 25 | Performed by: NURSE PRACTITIONER

## 2025-05-06 PROCEDURE — 96365 THER/PROPH/DIAG IV INF INIT: CPT | Mod: INF

## 2025-05-06 PROCEDURE — 2500000004 HC RX 250 GENERAL PHARMACY W/ HCPCS (ALT 636 FOR OP/ED): Performed by: NURSE PRACTITIONER

## 2025-05-06 PROCEDURE — 85025 COMPLETE CBC W/AUTO DIFF WBC: CPT

## 2025-05-06 PROCEDURE — 99214 OFFICE O/P EST MOD 30 MIN: CPT | Performed by: NURSE PRACTITIONER

## 2025-05-06 PROCEDURE — 83735 ASSAY OF MAGNESIUM: CPT

## 2025-05-06 PROCEDURE — 3078F DIAST BP <80 MM HG: CPT | Performed by: NURSE PRACTITIONER

## 2025-05-06 PROCEDURE — 2500000004 HC RX 250 GENERAL PHARMACY W/ HCPCS (ALT 636 FOR OP/ED): Mod: JZ | Performed by: NURSE PRACTITIONER

## 2025-05-06 RX ORDER — ALBUTEROL SULFATE 0.83 MG/ML
3 SOLUTION RESPIRATORY (INHALATION) AS NEEDED
OUTPATIENT
Start: 2025-05-06

## 2025-05-06 RX ORDER — MAGNESIUM SULFATE HEPTAHYDRATE 40 MG/ML
2 INJECTION, SOLUTION INTRAVENOUS ONCE
Status: CANCELLED | OUTPATIENT
Start: 2025-05-06 | End: 2025-05-06

## 2025-05-06 RX ORDER — POTASSIUM CHLORIDE 14.9 MG/ML
20 INJECTION INTRAVENOUS ONCE
Status: COMPLETED | OUTPATIENT
Start: 2025-05-06 | End: 2025-05-06

## 2025-05-06 RX ORDER — DIPHENHYDRAMINE HYDROCHLORIDE 50 MG/ML
50 INJECTION, SOLUTION INTRAMUSCULAR; INTRAVENOUS AS NEEDED
OUTPATIENT
Start: 2025-05-06

## 2025-05-06 RX ORDER — EPINEPHRINE 0.3 MG/.3ML
0.3 INJECTION SUBCUTANEOUS EVERY 5 MIN PRN
OUTPATIENT
Start: 2025-05-06

## 2025-05-06 RX ORDER — MAGNESIUM SULFATE HEPTAHYDRATE 40 MG/ML
2 INJECTION, SOLUTION INTRAVENOUS ONCE
Status: CANCELLED | OUTPATIENT
Start: 2025-05-06

## 2025-05-06 RX ORDER — FAMOTIDINE 10 MG/ML
20 INJECTION, SOLUTION INTRAVENOUS ONCE AS NEEDED
OUTPATIENT
Start: 2025-05-06

## 2025-05-06 RX ORDER — SODIUM CHLORIDE 9 MG/ML
500 INJECTION, SOLUTION INTRAVENOUS ONCE
Status: COMPLETED | OUTPATIENT
Start: 2025-05-06 | End: 2025-05-06

## 2025-05-06 RX ORDER — POTASSIUM CHLORIDE 14.9 MG/ML
20 INJECTION INTRAVENOUS ONCE
Status: CANCELLED | OUTPATIENT
Start: 2025-05-06 | End: 2025-05-06

## 2025-05-06 RX ORDER — SODIUM CHLORIDE 9 MG/ML
500 INJECTION, SOLUTION INTRAVENOUS ONCE
Status: CANCELLED | OUTPATIENT
Start: 2025-05-06

## 2025-05-06 RX ORDER — POTASSIUM CHLORIDE 14.9 MG/ML
20 INJECTION INTRAVENOUS ONCE
Status: CANCELLED | OUTPATIENT
Start: 2025-05-06

## 2025-05-06 RX ORDER — MAGNESIUM SULFATE HEPTAHYDRATE 40 MG/ML
2 INJECTION, SOLUTION INTRAVENOUS ONCE
Status: COMPLETED | OUTPATIENT
Start: 2025-05-06 | End: 2025-05-06

## 2025-05-06 RX ADMIN — MAGNESIUM SULFATE HEPTAHYDRATE 2 G: 40 INJECTION, SOLUTION INTRAVENOUS at 09:59

## 2025-05-06 RX ADMIN — POTASSIUM CHLORIDE 20 MEQ: 200 INJECTION, SOLUTION INTRAVENOUS at 09:58

## 2025-05-06 RX ADMIN — SODIUM CHLORIDE 500 ML/HR: 0.9 INJECTION, SOLUTION INTRAVENOUS at 09:58

## 2025-05-06 ASSESSMENT — PAIN SCALES - GENERAL: PAINLEVEL_OUTOF10: 0-NO PAIN

## 2025-05-06 NOTE — PROGRESS NOTES
NUTRITION Follow Up NOTE    Nutrition Assessment     Reason for Visit:  Ingris Mckinney is a 69 y.o. female who presents for primary SCC supraglottis  Pt in for radiation consult, asked to see 2/2 dx, weight status, weight loss    TX: Cisplatin with concurrent radiation - RT completes 5/6    Patient lives in [place] alone, Zulema, sister, lives in [place], other sister in [facility]. Sisters help pt with buying groceries etc    Contact Zulema Mckinney [phone]    Her insurance company provides transportation    PEG placed 4/3/25  16 F legacy tube    5/6- FUV, pt receiving IVF. Chemotherapy completed, RT today.   Pt resting but sat up for visit. Fatigued, sleeps a lot. Quiet, states she is doing fine and she is going to start eating more. Pt is known for simply not eating, staying on the couch, not getting up to get food. She made great strides and gained weight, was eating consistently and now seems to be reverting back. Pt made aware weight has fallen <90# and reminded her how hard she worked to get weight up and that she doesn't want to keep losing now, how that will affect her recovery, overall nutrition status and independence. She has frozen meals at home, yesterday had [employer]. Does not appear to have many snacks around, some cookies. Provided some suggestions, encouraged her to make a list, contact sister to order some groceries for her. Stressed every bite counts and it is important for her to get back to eating breakfast, dinner and some snacks + Boost VHC. Support provided  Have offered to order Boost VHC from [facility], she states she has the number in her contacts. If she is taking ONS she should need to place a re-order. LVM for [facility] re last time pt ordered.     Patient Active Problem List   Diagnosis    Acute on chronic respiratory failure with hypoxia and hypercapnia    Anxiety    Balance disorder    Blood loss anemia    Closed fracture of pelvis (Multi)    Closed fracture of right inferior pubic ramus     "Degenerative joint disease (DJD) of hip    Depression    Essential hypertension    ETOH abuse    Hyperlipidemia    Hypoxemia    Centrilobular emphysema (Multi)    COPD with exacerbation (Multi)    Severe protein-calorie malnutrition (Multi)    Lesion of larynx    Primary squamous cell carcinoma of supraglottis       Nutrition Significant Labs:  Lab Results   Component Value Date/Time    GLUCOSE 107 (H) 05/06/2025 0935     05/06/2025 0935    K 3.2 (L) 05/06/2025 0935    CL 95 (L) 05/06/2025 0935    CO2 33 (H) 05/06/2025 0935    ANIONGAP 15 05/06/2025 0935    BUN 8 05/06/2025 0935    CREATININE 0.49 (L) 05/06/2025 0935    EGFR >90 05/06/2025 0935    CALCIUM 8.6 05/06/2025 0935    ALBUMIN 4.2 05/06/2025 0935    ALKPHOS 43 05/06/2025 0935    PROT 6.7 05/06/2025 0935    AST 21 05/06/2025 0935    BILITOT 0.4 05/06/2025 0935    ALT 15 05/06/2025 0935    MG 1.18 (L) 05/06/2025 0935     No results found for: \"VITD25\"      Anthropometrics:               IBW/kg (Dietitian Calculated): 47.7 kg Percent of IBW: 85 %       Daily Weight  05/06/25 : (!) 40.3 kg (88 lb 13.5 oz)  05/01/25 : (!) 41.6 kg (91 lb 9.6 oz)  05/01/25 : (!) 41.6 kg (91 lb 11.4 oz)  04/28/25 : (!) 39.8 kg (87 lb 13.7 oz)  04/28/25 : (!) 39.8 kg (87 lb 13.7 oz)  04/24/25 : (!) 43.6 kg (96 lb 0.2 oz)  04/24/25 : (!) 42.5 kg (93 lb 12.9 oz)  04/21/25 : (!) 41.7 kg (91 lb 14.9 oz)  04/21/25 : (!) 41.7 kg (91 lb 14.9 oz)  04/17/25 : (!) 42.5 kg (93 lb 11.1 oz)  04/17/25 : (!) 42 kg (92 lb 7.7 oz)  04/14/25 : (!) 41.3 kg (90 lb 15 oz)  04/14/25 : (!) 41.3 kg (90 lb 15 oz)  04/10/25 : (!) 44.4 kg (97 lb 15.9 oz)  04/07/25 : (!) 43.2 kg (95 lb 3.8 oz)  04/07/25 : (!) 43.2 kg (95 lb 2.1 oz)  04/04/25 : (!) 44.8 kg (98 lb 12.3 oz)  03/31/25 : (!) 43 kg (94 lb 12.8 oz)  03/31/25 : (!) 43 kg (94 lb 12.8 oz)  03/27/25 : 46.1 kg (101 lb 10.1 oz)     3/20- radonc wt 43.4kg / 95#  4/4- OTV wt 44.8 kg / 98#  4/14 - infusion 41.3 kg (90#)  4/21 - infusion 41.7 kg (91#) "   5/1- OTV wt 41.6 kg (91#)     Weight Change %:  Weight History / % Weight Change: weight trending down    Nutrition History:  Food & Nutrition History:    Food Allergies:  (scallops)  Food Intolerances:    Vitamin/mineral intake:       Herbal supplements:    Medication and Complementary/Alternative Medicine Use:    Dentition:    Sleep Habits:      Diet Recall:  Meal 1:    Snack 1:    Meal 2:    Snack 2:    Meal 3: frozen meal  Snack 3:    Food Variety:    Oral Nutrition Supplement Use: Oral Nutrition Supplements: Boost Very High Calorie Frequency: back to 2/day Calories: 530 each Protein: 22 each  Fluid Intake: water  Energy Intake: Fair 50-75 %    Food Preparation:  Cooking: Patient  Grocery Shopping: Family  Dining Out:      Medications:  Current Outpatient Medications   Medication Instructions    albuterol 90 mcg/actuation inhaler 2 puffs, Every 4 hours PRN    albuterol 2.5 mg, Every 4 hours PRN    alendronate (FOSAMAX) 70 mg, oral, Every 7 days, Take in the morning with a full glass of water, on an empty stomach, and do not take anything else by mouth or lie down for the next 30 min.    dexAMETHasone (DECADRON) 8 mg, oral, Daily, For 3 days per week starting the day after treatment.    Dulera 200-5 mcg/actuation inhaler 2 puffs, 2 times daily    OLANZapine (ZYPREXA) 5 mg, oral, Nightly    ondansetron (ZOFRAN) 8 mg, oral, Every 8 hours PRN    prochlorperazine (COMPAZINE) 10 mg, oral, Every 6 hours PRN    rosuvastatin (CRESTOR) 10 mg, oral, Daily    umeclidinium (Incruse Ellipta) 62.5 mcg/actuation inhalation 1 puff, Daily RT       Nutrition Focused Physical Exam Findings: 5/6/25    Subcutaneous Fat Loss:   Orbital Fat Pads: Severe (dark circles, hollowing and loose skin)  Buccal Fat Pads: Severe (hollow, sunken and narrow face)  Triceps: Severe (negligible fat tissue)  Ribs: Severe (depression between the ribs very apparent, iliac crest very prominent)    Muscle Wasting:  Temporalis: Severe (hollowed scooping  depression)  Pectoralis (Clavicular Region): Severe (protruding prominent clavicle)  Deltoid/Trapezius: Severe (squared shoulders, acromion process prominent)  Interosseous: Severe (depressed area between thumb and forefinger)  Trapezius/Infraspinatus/Supraspinatus (Scapular Region): Severe (prominent visual scapula, depression between ribs, scapula or shoulder)  Quadriceps: Severe (depressions on inner and outer thigh)  Gastrocnemius: Severe (minimal muscle definition)    Physical Findings:  Hair: Negative  Eyes: Negative  Nails: Negative  Skin:  (reddened skin from radiation)  Digestive System Findings: Constipation (intermittent - Miralax when needed)  Mouth Findings: Dysgeusia, Dysphagia (states mouth feels pretty good, taste off alittle, minimal throat pain. Thick saliva)       Estimated Needs:          Total Energy Estimated Needs in 24 hours (kCal): 1425 kCal  Energy Estimated Needs per kg Body Weight in 24 hours (kCal/kg): 35 kCal/kg (4/14- recalculated based on current weight 90#)  Total Protein Estimated Needs in 24 Hours (g): 61 g  Protein Estimated Needs per kg Body Weight in 24 Hours (g/kg): 1.5 g/kg  Total Fluid Estimated Needs in 24 Hours (mL): 1425 mL  Method for Estimating 24 Hour Fluid Needs: 1 ml/kcal                       Nutrition Diagnosis   Malnutrition Diagnosis  Patient has Malnutrition Diagnosis: Yes  Diagnosis Status: Active  Malnutrition Diagnosis: Severe malnutrition related to chronic disease or condition  Related to: 9% weight loss over 1 month, poor oral intake, findings on NFPE (severe depletion fat and muscle stores)    Nutrition Diagnosis  Patient has Nutrition Diagnosis: Yes  Diagnosis Status (1): Active  Nutrition Diagnosis 1: Predicted inadequate energy intake  Related to (1): pathophsyiology of disease and treatment  As Evidenced by (1): anticipated nutrition impact symptoms affecting oral intake and weight  Additional Nutrition Diagnosis: Diagnosis 3  Diagnosis Status (2):  "Active  Nutrition Diagnosis 2: Increased energy expenditure  Related to (2): increased metabolic demand, disease process  As Evidenced by (2): cancer diagnosis, planned treatment  Diagnosis Status (3): Active  Nutrition Diagnosis 3: Disordered eating pattern  Related to (3): patients meal habits  As Evidenced by (3): pt not eating, caring for self, will not prepare food  Additional Assessment Information (3): 4/14 - intake down \"I mostly didn't try\"       Nutrition Interventions/Recommendations   Nutrition Prescription: Oral nutrition, Enteral nutrition soft/puree/liquid diet, high calorie high protein, management of NIS, enteral nutrition as needed    Nutrition Interventions:   Food and Nutrient Delivery: Meals & Snacks: Energy-modified diet, Fluid-modified diet, Modification of schedule of oral intake, Protein-modified diet, Texture-Modified Diet  Goals: Stressed importance of taking Boost VHC 2/day everyday + frozen meal and encouraged to get back to eating breakfast. Cereal, toast. Encouraged snacks and provided suggestions  Enteral Nutrition:  (+ PEG. When indicated Isosource 1.5 (3.5) cartons/day provides 1312 valentina, 60 grams pro, 669 ml free water. She can  use Boost VHC in PEG diluted with water, this was explained to her. [facility] holding onto enteral orders until needed and pt meets criteria)  Medical Food Supplement: Commercial beverage medical food supplement therapy  Goals: Continue Boost VHC BID consistently, daily  Feeding Assistance Management: Mouth care management  Goals: +s/s rinse  Other:: fluid  Goals: use PEG for hydration, fluid as needed     Coordination of Care: Collaboration and referral of nutrition care:  (collaboration with other providers)  Goals: work with team to achieve best possible outcomes     DME: [facility]  Boost VHC BID  Provides 1060 calories and 44 grams protein  Comments   Enteral orders sent 4/3/25 Isosource 1.5 3.5 cartons per day to provide 1312 calories, 60 grams protein 669 " ml free water  - [facility] holding enteral orders   Pt receives O2 from [facility]      Nutrition Education:   Nutrition Education Content:            Nutrition Monitoring and Evaluation   Food and Nutrient Intake  Monitoring and Evaluation Plan: Energy intake, Meal/snack pattern, Protein intake, Fluid intake, Amount of food  Energy Intake: Estimated energy intake  Criteria: Meet >75% estimated energy needs- food recall  Fluid Intake: Estimated fluid intake  Criteria: maintain hydration  Amount of Food: Medical food intake  Criteria: ONS as recommeded  Meal/Snack Pattern: Estimated meal and snack pattern  Criteria: 2-4 small meals/snacks per day  Estimated protein intake: Estimated protein intake  Criteria: meet >75% estimated protein needs - food recall    Anthropometric measurements  Monitoring and Evaluation Plan: Weight  Body Weight: Measured body weight  Criteria: maintain/gain weight         Nutrition Focused Physical Findings  Monitoring and Evaluation Plan: Adipose, Muscle, Digestive System  Adipose Finding: Loss of subcutaneous fat  Criteria: prevent further losses  Digestive System Finding: Constipation  Criteria: manage symptoms, adhere to regime  Muscle Finding: Muscle atrophy  Criteria: prevent further losses         Follow Up: as needed      Time Spent  Prep time on day of patient encounter: 5 minutes  Time spent directly with patient, family or caregiver: 15 minutes  Additional Time Spent on Patient Care Activities: 0 minutes  Documentation Time: 20 minutes  Other Time Spent: 0 minutes  Total: 40 minutes

## 2025-05-06 NOTE — PROGRESS NOTES
Subjective:   Patient Name: Ingris Mckinney    : 1955     MRN: [id number]     Age: 69 y.o.     Gender: female  Referring Provider: ARNAV    CC: Management of newly diagnosed L sided supraglottic squamous cell carcinoma ([id number])    Presenting History (3/13/2025): At time of initial presentation a 69 y.o. female, current smoker (started at age 19,1/3 pack per day) with a past medical history of osteoporosis, HLD, COPD (occasionally on O2) referred for management of suprglottic cancer.      She has been following with ENT for history of vocal cord paralysis without CT abnormality since , which was successfully managed with injection medialization.     She then developed acute worsening of her voice in 2024, with scope exam shortly after that showed mucosal irregularity along the anterior left false cord. The left true vocal cord was fixed in the paramedian position. The right true vocal cord exhibited normal movement. She was taken to the operating room for direct and biopsy on 2025. Operative findings showed a left false cord mass lesion which was not obstructive, and extended from anterior to posterior. The mass extended to the laryngeal ventricle on the left, but spared the true vocal cord. The mass did not appear to cross to the right. The vallecula, base of tongue and hypopharynx were normal. Biopsies from the left supraglottic mass were positive for invasive moderately differentiated squamous cell carcinoma.     CT neck on 2025 showed an enhancing mass in the left supraglottic larynx, 1.0 x 1.7 x 1.4 cm in size, which approached the epiglottis without definitive invasion. There was an area of irregularity/thickening on the right true vocal cord of unclear etiology.     2025: PET/CT: 1.  Focal intense hypermetabolic activity at the left aryepiglottic fold compatible with biopsy-proven squamous cell carcinoma. 2. No evidence of hypermetabolic kevin or distant metastatic disease.3.  "Mild hypermetabolic activity associated with the right upper lobe pulmonary nodule. Continued attention on follow-up chest CT is recommended.    She was seen by ENT Dr. Lo and unfortunately was deemed not surgically resectable. She presented to [facility] for a consultation accompanied by her brother in law. She lives on her own. She states that she has been lazy over the past months and has lost a lot of weight. She doesn't really eat much food other than cereals. Luckily her sister and in-law brought her dinner. She has gained 12 lb in the past weeks. She denies any dysphagia or odynophagia. NO pain. NO numbness or tingling. No heating issues. No fever or chills. No n/v/d/c    Social Hx:   No etoh or illicit drugs.   2 sisters   No kids      Treatment Summary:  - March 18, 2025 C1D1 Cisplatin - weekly with concurrent RT     Current Treatment:  - March 18, 2025 C1D1 Cisplatin 40mg/m2 IV - weekly with concurrent RT   - March 25, 2025 C1D8 Cisplatin 40mg/m2 IV  - March 31, 2025 C1D15 Cisplatin 40mg/m2 IV  - April 7, 2025 C1D22 Cisplatin 40mg/m2 IV  - April 14, 2025 C1D29 Cisplatin 40mg/m2 IV  - April 21, 2025 C1D36 Cisplatin 40mg/m2 IV  - April 28, 2025 C1D43 Cisplatin 40mg/m2 IV     Interval History (05/06/2025):    Patient presents today for toxicity check. She completed her 7th and final dose of Cisplatin on April 28, 2025. She is alone at today's visit. Aside from weight loss she tolerated treatment very well. She report \"normal\" weight for her is 90-100lbs.   PEG tube was placed. She has declined to use. She drinks 1 Boost daily. Eating little. Denies any throat pain today. No nausea or vomiting. She denies any fevers, chills or night sweats. + cough chronic COPD, no chest pain, JONES interferes with ADLs. On home oxygen has portable oxygen concentrator.  She reports fatigue. Denies constipation or diarrhea. No urinary symptoms. No rash. No neuropathy. Denies hearing changes or tinnitus. No further facial swelling.  "     G-tube placed 4/3/2025    Review of Systems:  A review of systems has been completed and are negative for complaints except what is stated in the HPI and/or past medical history      Medical History:   Past Medical History:   Diagnosis Date    Anxiety     At risk for falling     COPD (chronic obstructive pulmonary disease) (Multi)     Hypercholesteremia     Hypoxia     Larynx cancer (Multi)     Malnutrition (Multi)     Personal history of other diseases of the respiratory system     History of chronic obstructive lung disease    Respiratory failure (Multi)     Tachycardia     Weakness        Surgical History:   Past Surgical History:   Procedure Laterality Date    CT GUIDED IMAGING FOR NEEDLE PLACEMENT  05/05/2020    CT GUIDED IMAGING FOR NEEDLE PLACEMENT [facility]    LARYNGOSCOPY      direct w/ bx    OTHER SURGICAL HISTORY  11/11/2022    Hip surgery    OTHER SURGICAL HISTORY  11/11/2022    Arm surgery    OTHER SURGICAL HISTORY  11/11/2022    Leg surgery       Family History:  Family History   Problem Relation Name Age of Onset    Other (chronic lung disease) Other         Allergies:  Allergies   Allergen Reactions    Scallops Anaphylaxis    Iodinated Contrast Media Itching       Meds (Current):  Current Medications[1]      Pain Assessment:  Pain is: 0    Performance Status (ECOG): 2  ECOG Definition  0 Fully active; no performance restrictions.  1 Strenuous physical activity restricted; fully ambulatory and able to carry out light work.  2 Capable of all self-care but unable to carry out any work activities. Up and about >50% of waking hours.  3 Capable of only limited self-care; confined to bed or chair >50% of waking hours.  4 Completely disabled; cannot carry out any self-care; totally confined to bed or chair.  Exam:    Vital Signs:  /60   Pulse 93   Temp 36.1 °C (97 °F) (Temporal)   Resp 18   Wt (!) 40.3 kg (88 lb 13.5 oz)   SpO2 93%   BMI 16.35 kg/m²     Wt Readings from Last 5  Encounters:   25 (!) 40.3 kg (88 lb 13.5 oz)   25 (!) 41.6 kg (91 lb 9.6 oz)   25 (!) 41.6 kg (91 lb 11.4 oz)   25 (!) 39.8 kg (87 lb 13.7 oz)   25 (!) 39.8 kg (87 lb 13.7 oz)         Physical Exam:  ECO  Pain: 0  Constitutional: Thin appearing woman. awake/alert/oriented x3, no distress, alert and cooperative  Eyes: PER. sclera anicteric  ENMT: Oral mucosa moist, no lesions or thrush identified  Respiratory/Thorax: Breathing is non-labored. Lungs are clear to auscultation bilaterally. No adventitious breath sounds  Cardiovascular: S1-S2. Regular rate and rhythm. No murmurs, rubs, or gallops appreciated  Gastrointestinal: Abdomen soft nontender, nondistended, normal active bowel sounds. G-tube in place   Musculoskeletal: ROM intact, no joint swelling, normal strength  Extremities: normal extremities, no cyanosis, no edema, no clubbing  Lymphatics: no palpable lymphadenopathy   Skin: no rash  Neurologic: alert and oriented x3. Nonfocal exam. No myoclonus  Psychological: Pleasant, appropriate and easily engaged     Labs:  Lab Results   Component Value Date    WBC 2.9 (L) 2025    HGB 10.5 (L) 2025    HCT 30.3 (L) 2025    MCV 94 2025     (L) 2025      Lab Results   Component Value Date    NEUTROABS 2.00 2025      Lab Results   Component Value Date    GLUCOSE 107 (H) 2025    CALCIUM 8.6 2025     2025    K 3.2 (L) 2025    CO2 33 (H) 2025    CL 95 (L) 2025    BUN 8 2025    CREATININE 0.49 (L) 2025    MG 1.18 (L) 2025     Lab Results   Component Value Date    ALT 15 2025    AST 21 2025    ALKPHOS 43 2025    BILITOT 0.4 2025      Lab Results   Component Value Date    TSH 4.09 (H) 2025    FREET4 1.06 2025       Imaging:  No new imaging        Assessment/Plan:   69 y.o. female with past medical history as stated referred for management of supraglottic  squamous cell carcinoma.    The patient's records and imaging have been reviewed in detail.  I have discussed with the patient the natural history of their disease at length.  The following has also been discussed with the patient:    # Cancer:   L sided supraglottic squamous cell carcinoma ([id number]). Given that she is not surgically resectable. I thus recommend treatment of weekly cisplatin with concurrent RT for 7 weeks. Side effects of chenotherapy including but not limited to nausea, vomiting, diarrhea, anemia, thrombocytopenia,  leukopenia, neutropenic fever, kidney toxicities, liver toxicities, rash, infusion related reaction, secondary malignancies MDS or AML, and fertility impact, neuropathy and hearing impact. Consent signed. To start next week.     - Interval Imagin2025: PET/CT: 1.  Focal intense hypermetabolic activity at the left aryepiglottic fold compatible with biopsy-proven squamous cell carcinoma. 2. No evidence of hypermetabolic kevin or distant metastatic disease.3. Mild hypermetabolic activity associated with the right upper lobe pulmonary nodule. Continued attention on follow-up chest CT is recommended.    # Pulmonary nodule: will continue to follow.    # Clinical Trials:  None currently.     # Access: Peripheral veins.      # Pain: No acute pain.    # Bone Health: No signs of bony disease.     # Psychosocial: Coping well, no acute issues.  Good support from family & friends.  Social work support offered.  **OKAY to discuss treatment plans with sister.     # Hearing: NO baseline hearing issues.    # Speech and swallow: Order for G-tube placement   Has been sent to GI (will not put PEG in), General Surgery (consult appt ) and [facility] (no longer places PEGs)  Call out to IR at [facility] and social work will assist in transportation.   4/3/2025: G-tube was placed at [facility]  2025: Weight loss identified this week. Increased dysphagia and pain. Will need to start tube  feeds. Soft food for pleasure.  4/21/2025: 2 Boost/Ensure daily. Encouraged use of tube feed.   4/28/2025: Weight loss identified over past week. Encouraged tube feed. Continue to follow with dietitian.   5/6/2025: continue to monitor weight. Malnourished. Pt verbalizes her baseline is      # GI/CINV: severe malnutrition. Nutrition following.    # Smoking: N/A, current smoker, working on quitting.    # Fertility: N/A.  Oncofertility support available as needed.      # Heme/ESAs: N/A.    #JONES: Has home oxygen ([employer]). Portable concentrator ordered. Oxygen 93% RA at rest. 88% walking short distance with assistance. 97% at rest on 2L via NC.     # GOC: Patient is aware of curative intent goals of care.    # Language: English.    # Interdisciplinary Patient/Family Education Record:  Learner:  Patient.  Learning Needs Reviewed:  Treatments, Medications, Disease Process, Diagnostic Tests.  Barriers: None.  Teaching Method: Discussion, Handout(s).  Comprehension:  Verbalized Understanding.  Follow-up Plan:  Return to office as per MD.    # Dispo:   RTC in 2 week provider visit and labs (CBCd, CMP, Mg)      Michael Fregoso, APRN-CNP  [facility]  Thoracic + H&N Medical Oncology     I spent a total of 35+ minutes on the date of the service which included preparing to see the patient, face-to-face patient care, completing clinical documentation, obtaining and / or reviewing separately obtained history, counseling and educating the patient/family/caregiver, ordering medications, tests, or procedures, communicating with other healthcare providers (not separately reported), independently interpreting results, not separately reported, and communicating results to the patient/family/caregiver, Name and date of birth verified.              [1]   Current Outpatient Medications:     albuterol 2.5 mg /3 mL (0.083 %) nebulizer solution, Take 3 mL (2.5 mg) by nebulization every 4 hours if needed for wheezing., Disp: ,  Rfl:     albuterol 90 mcg/actuation inhaler, Inhale 2 puffs every 4 hours if needed., Disp: , Rfl:     alendronate (Fosamax) 70 mg tablet, Take 1 tablet (70 mg) by mouth every 7 days. Take in the morning with a full glass of water, on an empty stomach, and do not take anything else by mouth or lie down for the next 30 min., Disp: 12 tablet, Rfl: 3    dexAMETHasone (Decadron) 4 mg tablet, Take 2 tablets (8 mg) by mouth once daily. For 3 days per week starting the day after treatment., Disp: 42 tablet, Rfl: 0    Dulera 200-5 mcg/actuation inhaler, Inhale 2 puffs twice a day., Disp: , Rfl:     OLANZapine (ZyPREXA) 5 mg tablet, Take 1 tablet (5 mg) by mouth once daily at bedtime., Disp: 30 tablet, Rfl: 1    ondansetron (Zofran) 8 mg tablet, Take 1 tablet (8 mg) by mouth every 8 hours if needed for nausea or vomiting., Disp: 30 tablet, Rfl: 5    prochlorperazine (Compazine) 10 mg tablet, Take 1 tablet (10 mg) by mouth every 6 hours if needed for nausea or vomiting., Disp: 30 tablet, Rfl: 5    rosuvastatin (Crestor) 10 mg tablet, Take 1 tablet (10 mg) by mouth once daily., Disp: 90 tablet, Rfl: 2    umeclidinium (Incruse Ellipta) 62.5 mcg/actuation inhalation, Inhale 1 puff (62.5 mcg) once daily., Disp: , Rfl:   No current facility-administered medications for this visit.

## 2025-05-06 NOTE — PROGRESS NOTES
Patient here for follow up visit squamous cell superglottis. Last tx cisplatin 5/1. She is feeling pretty good other than really fatigued.   Continues with boost 2 x a day at this time. 88 lbs today. Was 90 lbs at start of treatment.  Denies N/V/D/C  Denies neuropathy.   Patient here alone  Arrives in a wheelchair with portable oxygen.   Denies pain  Breathing has not changed  Now mouth sores no open wounds.   Peg remains in place, she declines to use it at this time.     Medications and Allergies reviewed and reconciled this visit.    Follow up per MD request.    Patient reports availability and use of mychart, Reviewed this is a good place to communicate with the team as well as review labs and upcoming orders.      No barriers to education noted, patient agrees to current plan and verbalized understanding using teach back method.

## 2025-05-07 ENCOUNTER — DOCUMENTATION (OUTPATIENT)
Dept: RADIATION ONCOLOGY | Facility: CLINIC | Age: 70
End: 2025-05-07
Payer: MEDICARE

## 2025-05-07 NOTE — PROGRESS NOTES
Patient finished 35fx to the supraglottis on 5.6.25. Per Dr. Rivas patient will follow up on 6.10.25. Patient given and reviewed What you need to know after radiation. We reviewed side effects of radiation and how the effects can linger for 1-2 weeks. Patient instructed to call with questions or concerns. Patient verbalizes understanding with verbal teach back.

## 2025-05-07 NOTE — PROGRESS NOTES
Radiation Oncology Treatment Summary    Patient Name:  Ingris Mckinney  MRN:  [id number]  :  1955    Referring Provider: No ref. provider found  Primary Care Provider: Marge Camacho MD    Brief History: Ingris Mckinney is a 69 y.o. female with Primary squamous cell carcinoma of supraglottis, Clinical: Stage III (cT3, cN0, cM0).  The patient completed radiotherapy as outlined below.    Radiation Treatment Summary:    Radiation Therapy    Treatment Period Technique Fraction Dose Fractions Total Dose   Course 1 3/18/2025-2025  (days elapsed: 49)         H&N 3/18/2025-2025 VMAT 200 / 200 cGy 35 / 35 7,000 / 7,000 cGy       Please see the patient's [facility] chart for further details regarding the radiation plan, including beam energy.    Concurrent Chemotherapy:  Treatment Plans       Name Type Plan Dates Plan Provider         Active    CISplatin with Concurrent Radiation (Weekly), 7 Week Cycle Oncology Treatment 3/16/2025 - Present Kimmie Waters MD MPH                    CTCAE Toxicity Overview:   Toxicity Assessment          3/27/2025    11:14 2025    13:06 4/10/2025    12:45 2025    12:50 2025    13:11 2025    12:34   Toxicity Assessment   Treatment  and neck Head and neck Head and neck Head and neck Head and neck Head and neck   Anorexia Grade 0       Drinking 2 high protein shakes/day and eating what she wants Grade 1       Eating soft foods and drinking 1-2 high calorie boosts/day. Patient just had Peg placed yesterday Grade 0       drinking 2 boost plus eating Grade 1       eating the meals provided and 2 cans of Boost/day Grade 1       eating frozen meals and drinking 2 boosts/day Grade 1       drinking 2 Boosts/day and eating foods that they are bringing   Anxiety Grade 0 Grade 0 Grade 0 Grade 1 Grade 1 Grade 1   Dehydration Grade 0 Grade 0 Grade 0 Grade 0 Grade 0 Grade 0       flushing Peg once a day   Depression Grade 0 Grade 0 Grade 0 Grade 0 Grade 1 Grade 1   Dermatitis  Radiation Grade 0 Grade 0 Grade 1       some redness Grade 0 Grade 1       dry skin Grade 0   Diarrhea Grade 0 Grade 0 Grade 0 Grade 0 Grade 0 Grade 0   Fatigue Grade 1       naps during the day Grade 1       naps during the day Grade 1       taking naps, going to bed early Grade 1       naps during the day Grade 1 Grade 1       naps during the day   Fibrosis Deep Connective Tissue   Grade 0      Fracture   Grade 0      Nausea Grade 0 Grade 0 Grade 0 Grade 0 Grade 0 Grade 0   Pain Grade 0 Grade 2       pain level 8 to abdomen--from Peg placement Grade 0 Grade 0 Grade 0 Grade 0   Treatment Related Secondary Malignancy   Grade 0      Tumor Pain   Grade 0      Vomiting Grade 0 Grade 0 Grade 0 Grade 0 Grade 0 Grade 0   Dysphagia Grade 0 Grade 0 Grade 0 Grade 0 Grade 1 Grade 0   Esophagitis Grade 0 Grade 1 Grade 0 Grade 0 Grade 1       occasional Grade 0   Mucositis Oral Grade 0 Grade 0 Grade 0       using baking soda and salt rinse, blis lozenges Grade 0 Grade 0 Grade 0       using the salt and soda rinse   Hearing Impaired Grade 0 Grade 0 Grade 0 Grade 0 Grade 0 Grade 0   Blurred Vision Grade 0 Grade 0 Grade 0 Grade 0 Grade 0 Grade 0   Dry Eye Grade 0 Grade 0 Grade 0 Grade 0 Grade 0 Grade 0   Eye Pain Grade 0 Grade 0 Grade 0 Grade 0 Grade 0 Grade 0   Retinopathy   Grade 0      Dry Mouth Grade 1 Grade 0 Grade 1 Grade 1 Grade 1 Grade 0   Pneumonitis   Grade 0      Ear Pain Grade 0 Grade 0 Grade 0 Grade 0 Grade 0 Grade 0   External Ear Pain Grade 0 Grade 0 Grade 0 Grade 0 Grade 0 Grade 0   Tinnitus Grade 0 Grade 0 Grade 0 Grade 0 Grade 0 Grade 0   Watering Eyes   Grade 0 Grade 0 Grade 0 Grade 0   Oral Pain Grade 0 Grade 0 Grade 0 Grade 0 Grade 0 Grade 0   Salivary Duct Inflammation Grade 0 Grade 0 Grade 0 Grade 1 Grade 1 Grade 1   Edema Face Grade 0  Grade 0 Grade 0 Grade 0 Grade 0   Malaise   Grade 0      Neck Edema Grade 0 Grade 1       new. started early this am Grade 0 Grade 0 Grade 0 Grade 0   Head Soft Tissue  Necrosis   Grade 0      Neck Soft Tissue Necrosis   Grade 0      Osteonecrosis of Jaw   Grade 0      Superficial Soft Tissue Fibrosis   Grade 0      Trismus Grade 0 Grade 0 Grade 0 Grade 0 Grade 0 Grade 0   Dysarthria Grade 0 Grade 0 Grade 0 Grade 0 Grade 0 Grade 0   Dysesthesia Grade 0 Grade 0 Grade 0 Grade 0 Grade 0 Grade 0   Dysgeusia Grade 0 Grade 0 Grade 0 Grade 1 Grade 0 Grade 1       food taste bland   Facial Nerve Disorder   Grade 0      Hypoglossal Nerve Disorder   Grade 0      Oculomotor Nerve Disorder   Grade 0      Paresthesia   Grade 0      Stroke   Grade 0      Transient Ischemic Attacks   Grade 0      Trigeminal Nerve Disorder   Grade 0      Aspiration Grade 0 Grade 0 Grade 0 Grade 0  Grade 0   Hoarseness Grade 1       baseline Grade 3 Grade 3 Grade 3 Grade 2 Grade 2   Laryngeal Edema   Grade 0      Stridor   Grade 0      Tracheal Mucositis   Grade 0      Voice Alteration Grade 1 Grade 2 Grade 2 Grade 3 Grade 2 Grade 2     Patient Disposition: Per Dr. Rivas patient will follow up on 6.10.25.

## 2025-05-19 ENCOUNTER — TELEPHONE (OUTPATIENT)
Dept: RADIATION ONCOLOGY | Facility: CLINIC | Age: 70
End: 2025-05-19
Payer: MEDICARE

## 2025-05-21 ENCOUNTER — TELEPHONE (OUTPATIENT)
Dept: RADIATION ONCOLOGY | Facility: CLINIC | Age: 70
End: 2025-05-21
Payer: MEDICARE

## 2025-06-05 ENCOUNTER — NUTRITION (OUTPATIENT)
Dept: HEMATOLOGY/ONCOLOGY | Facility: CLINIC | Age: 70
End: 2025-06-05
Payer: MEDICARE

## 2025-06-05 NOTE — PROGRESS NOTES
"NUTRITION Follow Up NOTE    Nutrition Assessment     Reason for Visit:  Ingris Mckinney is a 69 y.o. female who presents for primary SCC supraglottis  Pt in for radiation consult, asked to see 2/2 dx, weight status, weight loss    TX: Cisplatin with concurrent radiation - RT completed 5/6    Patient lives in [place] alone, Zulema, sister, lives in [place], other sister in [facility]. Sisters help pt with buying groceries etc    Contact Zulema Mckinney [phone]    Her insurance company provides transportation    PEG placed 4/3/25  16 F legacy tube    Phoned pt for follow-up. No answer, left message with number encouraging call back. Also called pt's contact, sister Zulema. No answer. Left message with number encouraging call back.      Patient Active Problem List   Diagnosis    Acute on chronic respiratory failure with hypoxia and hypercapnia    Anxiety    Balance disorder    Blood loss anemia    Closed fracture of pelvis (Multi)    Closed fracture of right inferior pubic ramus    Degenerative joint disease (DJD) of hip    Depression    Essential hypertension    ETOH abuse    Hyperlipidemia    Hypoxemia    Centrilobular emphysema (Multi)    COPD with exacerbation (Multi)    Severe protein-calorie malnutrition (Multi)    Lesion of larynx    Primary squamous cell carcinoma of supraglottis       Nutrition Significant Labs:  Lab Results   Component Value Date/Time    GLUCOSE 107 (H) 05/06/2025 0935     05/06/2025 0935    K 3.2 (L) 05/06/2025 0935    CL 95 (L) 05/06/2025 0935    CO2 33 (H) 05/06/2025 0935    ANIONGAP 15 05/06/2025 0935    BUN 8 05/06/2025 0935    CREATININE 0.49 (L) 05/06/2025 0935    EGFR >90 05/06/2025 0935    CALCIUM 8.6 05/06/2025 0935    ALBUMIN 4.2 05/06/2025 0935    ALKPHOS 43 05/06/2025 0935    PROT 6.7 05/06/2025 0935    AST 21 05/06/2025 0935    BILITOT 0.4 05/06/2025 0935    ALT 15 05/06/2025 0935    MG 1.18 (L) 05/06/2025 0935     No results found for: \"VITD25\"      Anthropometrics:        "                  Daily Weight  05/06/25 : (!) 40.3 kg (88 lb 13.5 oz)  05/01/25 : (!) 41.6 kg (91 lb 9.6 oz)  05/01/25 : (!) 41.6 kg (91 lb 11.4 oz)  04/28/25 : (!) 39.8 kg (87 lb 13.7 oz)  04/28/25 : (!) 39.8 kg (87 lb 13.7 oz)  04/24/25 : (!) 43.6 kg (96 lb 0.2 oz)  04/24/25 : (!) 42.5 kg (93 lb 12.9 oz)  04/21/25 : (!) 41.7 kg (91 lb 14.9 oz)  04/21/25 : (!) 41.7 kg (91 lb 14.9 oz)  04/17/25 : (!) 42.5 kg (93 lb 11.1 oz)  04/17/25 : (!) 42 kg (92 lb 7.7 oz)  04/14/25 : (!) 41.3 kg (90 lb 15 oz)  04/14/25 : (!) 41.3 kg (90 lb 15 oz)  04/10/25 : (!) 44.4 kg (97 lb 15.9 oz)  04/07/25 : (!) 43.2 kg (95 lb 3.8 oz)  04/07/25 : (!) 43.2 kg (95 lb 2.1 oz)  04/04/25 : (!) 44.8 kg (98 lb 12.3 oz)  03/31/25 : (!) 43 kg (94 lb 12.8 oz)  03/31/25 : (!) 43 kg (94 lb 12.8 oz)  03/27/25 : 46.1 kg (101 lb 10.1 oz)     3/20- radonc wt 43.4kg / 95#  4/4- OTV wt 44.8 kg / 98#  4/14 - infusion 41.3 kg (90#)  4/21 - infusion 41.7 kg (91#)   5/1- OTV wt 41.6 kg (91#)     Weight Change %:       Nutrition History:  Food & Nutrition History:    Food Allergies:    Food Intolerances:    Vitamin/mineral intake:       Herbal supplements:    Medication and Complementary/Alternative Medicine Use:    Dentition:    Sleep Habits:      Diet Recall:  Meal 1:    Snack 1:    Meal 2:    Snack 2:    Meal 3:    Snack 3:    Food Variety:    Oral Nutrition Supplement Use:          Fluid Intake:    Energy Intake:      Food Preparation:  Cooking:    Grocery Shopping:    Dining Out:      Medications:  Current Outpatient Medications   Medication Instructions    albuterol 90 mcg/actuation inhaler 2 puffs, Every 4 hours PRN    albuterol 2.5 mg, Every 4 hours PRN    alendronate (FOSAMAX) 70 mg, oral, Every 7 days, Take in the morning with a full glass of water, on an empty stomach, and do not take anything else by mouth or lie down for the next 30 min.    dexAMETHasone (DECADRON) 8 mg, oral, Daily, For 3 days per week starting the day after treatment.    Dulera  200-5 mcg/actuation inhaler 2 puffs, 2 times daily    OLANZapine (ZYPREXA) 5 mg, oral, Nightly    ondansetron (ZOFRAN) 8 mg, oral, Every 8 hours PRN    prochlorperazine (COMPAZINE) 10 mg, oral, Every 6 hours PRN    rosuvastatin (CRESTOR) 10 mg, oral, Daily    umeclidinium (Incruse Ellipta) 62.5 mcg/actuation inhalation 1 puff, Daily RT       Nutrition Focused Physical Exam Findings:     Subcutaneous Fat Loss:        Muscle Wasting:       Physical Findings:          Estimated Needs:          Total Energy Estimated Needs in 24 hours (kCal): 1425 kCal  Energy Estimated Needs per kg Body Weight in 24 hours (kCal/kg): 35 kCal/kg (4/14- recalculated based on current weight 90#)  Total Protein Estimated Needs in 24 Hours (g): 61 g  Protein Estimated Needs per kg Body Weight in 24 Hours (g/kg): 1.5 g/kg  Total Fluid Estimated Needs in 24 Hours (mL): 1425 mL  Method for Estimating 24 Hour Fluid Needs: 1 ml/kcal                       Nutrition Diagnosis                Nutrition Interventions/Recommendations   Nutrition Prescription:        No answer. Left message and encouraged call back.     Nutrition Interventions:   Food and Nutrient Delivery:       Coordination of Care:       DME: [employer]  Boost VHC BID  Provides 1060 calories and 44 grams protein  Comments   Enteral orders sent 4/3/25 [employer]ource 1.5 3.5 cartons per day to provide 1312 calories, 60 grams protein 669 ml free water  - [facility] holding enteral orders   Pt receives O2 from [facility]      Nutrition Education:   Nutrition Education Content:            Nutrition Monitoring and Evaluation                            Follow Up: as needed      Time Spent  Prep time on day of patient encounter: 5 minutes  Time spent directly with patient, family or caregiver: 0 minutes  Additional Time Spent on Patient Care Activities: 0 minutes  Documentation Time: 5 minutes  Other Time Spent: 0 minutes  Total: 10 minutes

## 2025-06-09 ENCOUNTER — TELEPHONE (OUTPATIENT)
Dept: HEMATOLOGY/ONCOLOGY | Facility: CLINIC | Age: 70
End: 2025-06-09
Payer: MEDICARE

## 2025-06-10 ENCOUNTER — HOSPITAL ENCOUNTER (OUTPATIENT)
Dept: RADIATION ONCOLOGY | Facility: CLINIC | Age: 70
Setting detail: RADIATION/ONCOLOGY SERIES
Discharge: HOME | End: 2025-06-10
Payer: MEDICARE

## 2025-06-10 ENCOUNTER — LAB (OUTPATIENT)
Dept: LAB | Facility: CLINIC | Age: 70
End: 2025-06-10
Payer: MEDICARE

## 2025-06-10 ENCOUNTER — OFFICE VISIT (OUTPATIENT)
Dept: HEMATOLOGY/ONCOLOGY | Facility: CLINIC | Age: 70
End: 2025-06-10
Payer: MEDICARE

## 2025-06-10 VITALS
OXYGEN SATURATION: 95 % | TEMPERATURE: 95.7 F | HEART RATE: 110 BPM | RESPIRATION RATE: 18 BRPM | SYSTOLIC BLOOD PRESSURE: 116 MMHG | BODY MASS INDEX: 14.95 KG/M2 | WEIGHT: 81.24 LBS | DIASTOLIC BLOOD PRESSURE: 60 MMHG

## 2025-06-10 VITALS
HEART RATE: 108 BPM | DIASTOLIC BLOOD PRESSURE: 64 MMHG | SYSTOLIC BLOOD PRESSURE: 114 MMHG | WEIGHT: 81.13 LBS | OXYGEN SATURATION: 92 % | RESPIRATION RATE: 18 BRPM | BODY MASS INDEX: 14.93 KG/M2 | TEMPERATURE: 96.4 F

## 2025-06-10 DIAGNOSIS — E43 SEVERE PROTEIN-CALORIE MALNUTRITION (MULTI): ICD-10-CM

## 2025-06-10 DIAGNOSIS — C32.1 PRIMARY SQUAMOUS CELL CARCINOMA OF SUPRAGLOTTIS: Primary | ICD-10-CM

## 2025-06-10 DIAGNOSIS — C32.1 PRIMARY SQUAMOUS CELL CARCINOMA OF SUPRAGLOTTIS: ICD-10-CM

## 2025-06-10 LAB
ALBUMIN SERPL BCP-MCNC: 4.1 G/DL (ref 3.4–5)
ALP SERPL-CCNC: 36 U/L (ref 33–136)
ALT SERPL W P-5'-P-CCNC: 6 U/L (ref 7–45)
ANION GAP SERPL CALC-SCNC: 12 MMOL/L (ref 10–20)
AST SERPL W P-5'-P-CCNC: 15 U/L (ref 9–39)
BASOPHILS # BLD AUTO: 0.03 X10*3/UL (ref 0–0.1)
BASOPHILS NFR BLD AUTO: 1 %
BILIRUB SERPL-MCNC: 0.6 MG/DL (ref 0–1.2)
BUN SERPL-MCNC: 7 MG/DL (ref 6–23)
CALCIUM SERPL-MCNC: 9.6 MG/DL (ref 8.6–10.3)
CHLORIDE SERPL-SCNC: 97 MMOL/L (ref 98–107)
CO2 SERPL-SCNC: 34 MMOL/L (ref 21–32)
CREAT SERPL-MCNC: 0.49 MG/DL (ref 0.5–1.05)
EGFRCR SERPLBLD CKD-EPI 2021: >90 ML/MIN/1.73M*2
EOSINOPHIL # BLD AUTO: 0.07 X10*3/UL (ref 0–0.7)
EOSINOPHIL NFR BLD AUTO: 2.3 %
ERYTHROCYTE [DISTWIDTH] IN BLOOD BY AUTOMATED COUNT: 16 % (ref 11.5–14.5)
GLUCOSE SERPL-MCNC: 114 MG/DL (ref 74–99)
HCT VFR BLD AUTO: 28.9 % (ref 36–46)
HGB BLD-MCNC: 9.4 G/DL (ref 12–16)
IMM GRANULOCYTES # BLD AUTO: 0.01 X10*3/UL (ref 0–0.7)
IMM GRANULOCYTES NFR BLD AUTO: 0.3 % (ref 0–0.9)
LYMPHOCYTES # BLD AUTO: 0.62 X10*3/UL (ref 1.2–4.8)
LYMPHOCYTES NFR BLD AUTO: 20.2 %
MAGNESIUM SERPL-MCNC: 1.7 MG/DL (ref 1.6–2.4)
MCH RBC QN AUTO: 34.2 PG (ref 26–34)
MCHC RBC AUTO-ENTMCNC: 32.5 G/DL (ref 32–36)
MCV RBC AUTO: 105 FL (ref 80–100)
MONOCYTES # BLD AUTO: 0.28 X10*3/UL (ref 0.1–1)
MONOCYTES NFR BLD AUTO: 9.1 %
NEUTROPHILS # BLD AUTO: 2.06 X10*3/UL (ref 1.2–7.7)
NEUTROPHILS NFR BLD AUTO: 67.1 %
NRBC BLD-RTO: 0 /100 WBCS (ref 0–0)
PLATELET # BLD AUTO: 195 X10*3/UL (ref 150–450)
POTASSIUM SERPL-SCNC: 3.4 MMOL/L (ref 3.5–5.3)
PROT SERPL-MCNC: 6.8 G/DL (ref 6.4–8.2)
RBC # BLD AUTO: 2.75 X10*6/UL (ref 4–5.2)
SODIUM SERPL-SCNC: 140 MMOL/L (ref 136–145)
WBC # BLD AUTO: 3.1 X10*3/UL (ref 4.4–11.3)

## 2025-06-10 PROCEDURE — 85025 COMPLETE CBC W/AUTO DIFF WBC: CPT

## 2025-06-10 PROCEDURE — G2211 COMPLEX E/M VISIT ADD ON: HCPCS | Performed by: STUDENT IN AN ORGANIZED HEALTH CARE EDUCATION/TRAINING PROGRAM

## 2025-06-10 PROCEDURE — 1126F AMNT PAIN NOTED NONE PRSNT: CPT | Performed by: NURSE PRACTITIONER

## 2025-06-10 PROCEDURE — 80053 COMPREHEN METABOLIC PANEL: CPT

## 2025-06-10 PROCEDURE — 83735 ASSAY OF MAGNESIUM: CPT

## 2025-06-10 PROCEDURE — 99213 OFFICE O/P EST LOW 20 MIN: CPT | Performed by: NURSE PRACTITIONER

## 2025-06-10 PROCEDURE — 36415 COLL VENOUS BLD VENIPUNCTURE: CPT

## 2025-06-10 PROCEDURE — 3074F SYST BP LT 130 MM HG: CPT | Performed by: NURSE PRACTITIONER

## 2025-06-10 PROCEDURE — 99211 OFF/OP EST MAY X REQ PHY/QHP: CPT | Performed by: STUDENT IN AN ORGANIZED HEALTH CARE EDUCATION/TRAINING PROGRAM

## 2025-06-10 PROCEDURE — 3078F DIAST BP <80 MM HG: CPT | Performed by: NURSE PRACTITIONER

## 2025-06-10 PROCEDURE — 1159F MED LIST DOCD IN RCRD: CPT | Performed by: NURSE PRACTITIONER

## 2025-06-10 ASSESSMENT — ENCOUNTER SYMPTOMS
LOSS OF SENSATION IN FEET: 0
DEPRESSION: 0
OCCASIONAL FEELINGS OF UNSTEADINESS: 1

## 2025-06-10 ASSESSMENT — PAIN SCALES - GENERAL
PAINLEVEL_OUTOF10: 0-NO PAIN
PAINLEVEL_OUTOF10: 0-NO PAIN

## 2025-06-10 NOTE — ADDENDUM NOTE
Encounter addended by: Kevin Gant RN on: 6/10/2025 9:13 AM   Actions taken: Patient Education assessment filed, Patient Education documented on, Clinical Note Signed

## 2025-06-10 NOTE — PROGRESS NOTES
Patient here for follow up visitSquamous cell base of tongue, completed concurrent treatment 1 month ago. Seh is slowly recovering. Seh offers no concerns or complaints.   Denies N/V/D/C  Denies pain at this time.  Feels like her taste is improving.   No concerns or complaints noted at this time.   Patient here with    Medications and Allergies reviewed and reconciled this visit.    Follow up per MD request.    Patient reports availability and use of mychart, Reviewed this is a good place to communicate with the team as well as review labs and upcoming orders.      No barriers to education noted, patient agrees to current plan and verbalized understanding using teach back method.

## 2025-06-10 NOTE — ADDENDUM NOTE
Encounter addended by: Kevin Gant RN on: 6/10/2025 9:15 AM   Actions taken: Patient Education assessment deleted, Patient Education assessment filed

## 2025-06-10 NOTE — PROGRESS NOTES
Radiation Oncology Follow-Up    Patient Name:  Ingris Mckinney  MRN:  [id number]  :  1955    Referring Provider: Noemy Rivas DO  Primary Care Provider: Marge Camacho MD  Care Team: Patient Care Team:  Marge Camacho MD as PCP - General (Internal Medicine)  Ngoc Selby MD as PCP - [employer] PCP  Noemy Rivas DO as Radiation Oncologist (Radiation Oncology)  Tamiko Mahoney RN as Registered Nurse (Radiation Therapy)  Daphney Loo, TWYLA as Nurse Navigator (Hematology and Oncology)  Kimmie Waters MD MPH as Consulting Physician (Hematology and Oncology)  RADHA Mendoza-CNP as Nurse Practitioner (Hematology and Oncology)    Date of Service: 6/10/2025    Diagnosis: 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 Mckinney is a 69 y.o. woman, who returns to radiation oncology clinic today for follow-up after completing chemoradiation in May, as below. She had worsening dysphagia following treatment and had decreased PO intake and subsequent additional weight loss. She has not been using her feeding tube regularly, but does note her dysphagia has improved over the past few days. She stated she will start increasing to two Boosts/day now, and has also been tolerating small amounts of regular p.o intake. She denies any significant odynophagia. She denies any weakness/dizziness or lightheadedness. She continues to have significant hoarseness. She has some mild xerostomia which has been improving.       Treatment Rendered:   Radiation Therapy    Treatment Period Technique Fraction Dose Fractions Total Dose   Course 1 3/18/2025-2025  (days elapsed: 49)         H&N 3/18/2025-2025 VMAT 200 / 200 cGy 35 / 35 7,000 / 7,000 cGy       Review of Systems:   Review of Systems - Oncology    Performance Status:   The Karnofsky performance scale today is 80,  Normal activity with effort; some signs or symptoms of disease (ECOG equivalent 1).       OBJECTIVE  Vital Signs:  /60   Pulse 110   Temp 35.4 °C (95.7 °F) (Temporal)   Resp 18   Wt (!) 36.8 kg (81 lb 3.8 oz)   SpO2 95%   BMI 14.95 kg/m²   Daily Weight  06/10/25 : (!) 36.8 kg (81 lb 3.8 oz)  05/06/25 : (!) 40.3 kg (88 lb 13.5 oz)  05/01/25 : (!) 41.6 kg (91 lb 9.6 oz)  05/01/25 : (!) 41.6 kg (91 lb 11.4 oz)  04/28/25 : (!) 39.8 kg (87 lb 13.7 oz)  04/28/25 : (!) 39.8 kg (87 lb 13.7 oz)  04/24/25 : (!) 43.6 kg (96 lb 0.2 oz)  04/24/25 : (!) 42.5 kg (93 lb 12.9 oz)  04/21/25 : (!) 41.7 kg (91 lb 14.9 oz)  04/21/25 : (!) 41.7 kg (91 lb 14.9 oz)     Physical Exam  Vitals reviewed.   Constitutional:       General: She is not in acute distress.     Appearance: Normal appearance. She is underweight. She is not ill-appearing.   HENT:      Head: Normocephalic and atraumatic.      Mouth/Throat:      Mouth: Mucous membranes are moist.      Comments: No appreciable mucositis on exam  Eyes:      Conjunctiva/sclera: Conjunctivae normal.   Neck:      Comments: No significant dermatitis  Cardiovascular:      Rate and Rhythm: Normal rate.   Pulmonary:      Effort: Pulmonary effort is normal.   Abdominal:      General: There is no distension.   Musculoskeletal:         General: Normal range of motion.   Lymphadenopathy:      Cervical: No cervical adenopathy.   Skin:     General: Skin is warm and dry.   Neurological:      Mental Status: She is alert and oriented to person, place, and time.   Psychiatric:         Mood and Affect: Mood normal.         Behavior: Behavior normal.            ASSESSMENT:   Ms. Mckinney is a 69 y.o. man with left Primary squamous cell carcinoma of supraglottis, Clinical: Stage III (cT3, cN0, cM0), status post definitive chemoradiation.She has clinically been recovering relatively well since completing treatment, though has lost additional weight due to some residual dysphagia which has now been  improving, inconsistent Boost intake and without use of her feeding tube.    PLAN: We discussed the critical importance of improving her nutritional status, and supplementing with both Boost and her feeding tube if needed to help regain some weight while she would like to continue to eat solid foods, we strongly encouraged her to continue to supplement with until her weight recovers, to which she expressed understanding Boost/feeding tube. We will otherwise plan to have her return for follow-up in approximately 2 months, with repeat PET/CT prior to her next visit. We also recommend she reestablish follow-up with ENT in the interim.  NCCN Guidelines were applicable to guide this patients treatment plan.  Noemy Rivas DO

## 2025-06-10 NOTE — PROGRESS NOTES
Radiation Oncology Nursing Note    Pain: The patient's current pain level was assessed.  They report currently having a pain of 0 out of 10.  They feel their pain is under control without the use of pain medications.    Review of Systems:  Review of Systems - Oncology     Education Documentation  Treatment Plan and Schedule, taught by Kevin Gant RN at 6/10/2025  9:12 AM.  Learner: Patient  Readiness: Acceptance  Method: Explanation  Response: Verbalizes Understanding    Education Comments  No comments found.      Patient will follow up in 2 months with Dr. Rivas with PET prior.

## 2025-06-10 NOTE — PROGRESS NOTES
Subjective:   Patient Name: Ingris Mckinney    : 1955     MRN: [id number]     Age: 69 y.o.     Gender: female  Referring Provider: ARNAV    CC: Management of newly diagnosed L sided supraglottic squamous cell carcinoma ([id number])    Presenting History (3/13/2025): At time of initial presentation a 69 y.o. female, current smoker (started at age 19,1/3 pack per day) with a past medical history of osteoporosis, HLD, COPD (occasionally on O2) referred for management of suprglottic cancer.      She has been following with ENT for history of vocal cord paralysis without CT abnormality since , which was successfully managed with injection medialization.     She then developed acute worsening of her voice in 2024, with scope exam shortly after that showed mucosal irregularity along the anterior left false cord. The left true vocal cord was fixed in the paramedian position. The right true vocal cord exhibited normal movement. She was taken to the operating room for direct and biopsy on 2025. Operative findings showed a left false cord mass lesion which was not obstructive, and extended from anterior to posterior. The mass extended to the laryngeal ventricle on the left, but spared the true vocal cord. The mass did not appear to cross to the right. The vallecula, base of tongue and hypopharynx were normal. Biopsies from the left supraglottic mass were positive for invasive moderately differentiated squamous cell carcinoma.     CT neck on 2025 showed an enhancing mass in the left supraglottic larynx, 1.0 x 1.7 x 1.4 cm in size, which approached the epiglottis without definitive invasion. There was an area of irregularity/thickening on the right true vocal cord of unclear etiology.     2025: PET/CT: 1.  Focal intense hypermetabolic activity at the left aryepiglottic fold compatible with biopsy-proven squamous cell carcinoma. 2. No evidence of hypermetabolic kevin or distant metastatic disease.3.  "Mild hypermetabolic activity associated with the right upper lobe pulmonary nodule. Continued attention on follow-up chest CT is recommended.    She was seen by ENT Dr. Lo and unfortunately was deemed not surgically resectable. She presented to [facility] for a consultation accompanied by her brother in law. She lives on her own. She states that she has been lazy over the past months and has lost a lot of weight. She doesn't really eat much food other than cereals. Luckily her sister and in-law brought her dinner. She has gained 12 lb in the past weeks. She denies any dysphagia or odynophagia. NO pain. NO numbness or tingling. No heating issues. No fever or chills. No n/v/d/c    Social Hx:   No etoh or illicit drugs.   2 sisters   No kids      Treatment Summary:  - March 18, 2025 C1D1 Cisplatin - weekly with concurrent RT     Current Treatment:  - March 18, 2025 C1D1 Cisplatin 40mg/m2 IV - weekly with concurrent RT   - March 25, 2025 C1D8 Cisplatin 40mg/m2 IV  - March 31, 2025 C1D15 Cisplatin 40mg/m2 IV  - April 7, 2025 C1D22 Cisplatin 40mg/m2 IV  - April 14, 2025 C1D29 Cisplatin 40mg/m2 IV  - April 21, 2025 C1D36 Cisplatin 40mg/m2 IV  - April 28, 2025 C1D43 Cisplatin 40mg/m2 IV   - May 6, 2025 final RT    Interval History (06/10/2025):    Patient presents today for follow-up evaluation. She is alone at today's visit. She completed her 7th and final dose of Cisplatin on April 28 and final radiation treatment on May 6, 2025. Aside from weight loss she tolerated treatment very well. She is noted to have continued loss of weight since competition of treatment. She reports experiencing worsening dysphagia upon completion of treatment. She reports this has improved over the past week. She was not utilizing the PEG tube. She report \"normal\" weight for her is 90-100lbs.   Today she denies any throat pain today. Denies dysphagia. No nausea or vomiting. She denies any fevers, chills or night sweats. + cough chronic COPD, no " chest pain, JONES interferes with ADLs. On home oxygen has portable oxygen concentrator.  She reports fatigue. Denies constipation or diarrhea. No urinary symptoms. No rash. No neuropathy. Denies hearing changes or tinnitus. No further facial swelling.      G-tube placed 4/3/2025    Review of Systems:  A review of systems has been completed and are negative for complaints except what is stated in the HPI and/or past medical history      Medical History:   Past Medical History:   Diagnosis Date    Anxiety     At risk for falling     COPD (chronic obstructive pulmonary disease) (Multi)     Hypercholesteremia     Hypoxia     Larynx cancer (Multi)     Malnutrition (Multi)     Personal history of other diseases of the respiratory system     History of chronic obstructive lung disease    Respiratory failure (Multi)     Tachycardia     Weakness        Surgical History:   Past Surgical History:   Procedure Laterality Date    CT GUIDED IMAGING FOR NEEDLE PLACEMENT  05/05/2020    CT GUIDED IMAGING FOR NEEDLE PLACEMENT [facility]    LARYNGOSCOPY      direct w/ bx    OTHER SURGICAL HISTORY  11/11/2022    Hip surgery    OTHER SURGICAL HISTORY  11/11/2022    Arm surgery    OTHER SURGICAL HISTORY  11/11/2022    Leg surgery       Family History:  Family History   Problem Relation Name Age of Onset    Other (chronic lung disease) Other         Allergies:  Allergies   Allergen Reactions    Scallops Anaphylaxis    Iodinated Contrast Media Itching       Meds (Current):  Current Medications[1]        Pain Assessment:  Pain is: 0    Performance Status (ECOG): 2  ECOG Definition  0 Fully active; no performance restrictions.  1 Strenuous physical activity restricted; fully ambulatory and able to carry out light work.  2 Capable of all self-care but unable to carry out any work activities. Up and about >50% of waking hours.  3 Capable of only limited self-care; confined to bed or chair >50% of waking hours.  4 Completely disabled;  cannot carry out any self-care; totally confined to bed or chair.  Exam:    Vital Signs:  /64 (BP Location: Left arm, Patient Position: Sitting, BP Cuff Size: Adult long)   Pulse 108   Temp 35.8 °C (96.4 °F) (Temporal)   Resp 18   Wt (!) 36.8 kg (81 lb 2.1 oz)   SpO2 92%   BMI 14.93 kg/m²     Wt Readings from Last 5 Encounters:   06/10/25 (!) 36.8 kg (81 lb 2.1 oz)   06/10/25 (!) 36.8 kg (81 lb 3.8 oz)   25 (!) 40.3 kg (88 lb 13.5 oz)   25 (!) 41.6 kg (91 lb 9.6 oz)   25 (!) 41.6 kg (91 lb 11.4 oz)         Physical Exam:  ECO  Pain: 0  Constitutional: Thin appearing woman. awake/alert/oriented x3, no distress, alert and cooperative  Eyes: PER. sclera anicteric  ENMT: Oral mucosa moist, no lesions or thrush identified  Respiratory/Thorax: Breathing is non-labored. Lungs are clear to auscultation bilaterally. No adventitious breath sounds  Cardiovascular: S1-S2. Regular rate and rhythm. No murmurs, rubs, or gallops appreciated  Gastrointestinal: Abdomen soft nontender, nondistended, normal active bowel sounds. G-tube in place   Musculoskeletal: ROM intact, no joint swelling, normal strength  Extremities: normal extremities, no cyanosis, no edema, no clubbing  Lymphatics: no palpable lymphadenopathy   Skin: no rash  Neurologic: alert and oriented x3. Nonfocal exam. No myoclonus  Psychological: Pleasant, appropriate and easily engaged     Labs:  Lab Results   Component Value Date    WBC 3.1 (L) 06/10/2025    HGB 9.4 (L) 06/10/2025    HCT 28.9 (L) 06/10/2025     (H) 06/10/2025     06/10/2025      Lab Results   Component Value Date    NEUTROABS 2.06 06/10/2025      Lab Results   Component Value Date    GLUCOSE 114 (H) 06/10/2025    CALCIUM 9.6 06/10/2025     06/10/2025    K 3.4 (L) 06/10/2025    CO2 34 (H) 06/10/2025    CL 97 (L) 06/10/2025    BUN 7 06/10/2025    CREATININE 0.49 (L) 06/10/2025    MG 1.70 06/10/2025     Lab Results   Component Value Date    ALT 6 (L)  06/10/2025    AST 15 06/10/2025    ALKPHOS 36 06/10/2025    BILITOT 0.6 06/10/2025      Lab Results   Component Value Date    TSH 4.09 (H) 2025    FREET4 1.06 2025           Imaging:  No new imaging        Assessment/Plan:   69 y.o. female with past medical history as stated referred for management of supraglottic squamous cell carcinoma.    The patient's records and imaging have been reviewed in detail.  I have discussed with the patient the natural history of their disease at length.  The following has also been discussed with the patient:    # Cancer:   L sided supraglottic squamous cell carcinoma ([id number]). Given that she is not surgically resectable. I thus recommend treatment of weekly cisplatin with concurrent RT for 7 weeks. Side effects of chenotherapy including but not limited to nausea, vomiting, diarrhea, anemia, thrombocytopenia,  leukopenia, neutropenic fever, kidney toxicities, liver toxicities, rash, infusion related reaction, secondary malignancies MDS or AML, and fertility impact, neuropathy and hearing impact. Consent signed. To start next week.     - Interval Imagin2025: PET/CT: 1.  Focal intense hypermetabolic activity at the left aryepiglottic fold compatible with biopsy-proven squamous cell carcinoma. 2. No evidence of hypermetabolic kevin or distant metastatic disease.3. Mild hypermetabolic activity associated with the right upper lobe pulmonary nodule. Continued attention on follow-up chest CT is recommended.    # Pulmonary nodule: will continue to follow.    # Clinical Trials:  None currently.     # Access: Peripheral veins.      # Pain: No acute pain.    # Bone Health: No signs of bony disease.     # Psychosocial: Coping well, no acute issues.  Good support from family & friends.  Social work support offered.  **OKAY to discuss treatment plans with sister.     # Hearing: NO baseline hearing issues.    # Speech and swallow: Order for G-tube placement   Has been sent to  GI (will not put PEG in), General Surgery (consult appt April 8) and [facility] (no longer places PEGs)  Call out to IR at [facility] and social work will assist in transportation.   4/3/2025: G-tube was placed at [facility]  4/14/2025: Weight loss identified this week. Increased dysphagia and pain. Will need to start tube feeds. Soft food for pleasure.  4/21/2025: 2 Boost/Ensure daily. Encouraged use of tube feed.   4/28/2025: Weight loss identified over past week. Encouraged tube feed. Continue to follow with dietitian.   5/6/2025: continue to monitor weight. Malnourished. Pt verbalizes her baseline is    6/10/2025: continued wt loss. Not using PEG. Agrees to food for pleasure and Boost starting 2 per day via PEG    # GI/CINV: severe malnutrition. Nutrition following.    # Smoking: N/A, current smoker, working on quitting.    # Fertility: N/A.  Oncofertility support available as needed.      # Heme/ESAs: N/A.    #JONES: Has home oxygen ([employer]). Portable concentrator ordered. Oxygen 93% RA at rest. 88% walking short distance with assistance. 97% at rest on 2L via NC.     # GOC: Patient is aware of curative intent goals of care.    # Language: English.    # Interdisciplinary Patient/Family Education Record:  Learner:  Patient.  Learning Needs Reviewed:  Treatments, Medications, Disease Process, Diagnostic Tests.  Barriers: None.  Teaching Method: Discussion, Handout(s).  Comprehension:  Verbalized Understanding.  Follow-up Plan:  Return to office as per MD.    # Dispo:   RTC July 7, 2025 - 1100am      Michael Fregoso, APRN-CNP  [facility]  Thoracic + [facility]&N Medical Oncology     I spent a total of 30+ minutes on the date of the service which included preparing to see the patient, face-to-face patient care, completing clinical documentation, obtaining and / or reviewing separately obtained history, counseling and educating the patient/family/caregiver, ordering medications, tests, or procedures,  communicating with other healthcare providers (not separately reported), independently interpreting results, not separately reported, and communicating results to the patient/family/caregiver, Name and date of birth verified.                [1]   Current Outpatient Medications:     albuterol 2.5 mg /3 mL (0.083 %) nebulizer solution, Take 3 mL (2.5 mg) by nebulization every 4 hours if needed for wheezing., Disp: , Rfl:     albuterol 90 mcg/actuation inhaler, Inhale 2 puffs every 4 hours if needed., Disp: , Rfl:     alendronate (Fosamax) 70 mg tablet, Take 1 tablet (70 mg) by mouth every 7 days. Take in the morning with a full glass of water, on an empty stomach, and do not take anything else by mouth or lie down for the next 30 min., Disp: 12 tablet, Rfl: 3    dexAMETHasone (Decadron) 4 mg tablet, Take 2 tablets (8 mg) by mouth once daily. For 3 days per week starting the day after treatment., Disp: 42 tablet, Rfl: 0    Dulera 200-5 mcg/actuation inhaler, Inhale 2 puffs twice a day., Disp: , Rfl:     OLANZapine (ZyPREXA) 5 mg tablet, Take 1 tablet (5 mg) by mouth once daily at bedtime., Disp: 30 tablet, Rfl: 1    rosuvastatin (Crestor) 10 mg tablet, Take 1 tablet (10 mg) by mouth once daily., Disp: 90 tablet, Rfl: 2    umeclidinium (Incruse Ellipta) 62.5 mcg/actuation inhalation, Inhale 1 puff (62.5 mcg) once daily., Disp: , Rfl:     ondansetron (Zofran) 8 mg tablet, Take 1 tablet (8 mg) by mouth every 8 hours if needed for nausea or vomiting. (Patient not taking: Reported on 6/10/2025), Disp: 30 tablet, Rfl: 5    prochlorperazine (Compazine) 10 mg tablet, Take 1 tablet (10 mg) by mouth every 6 hours if needed for nausea or vomiting. (Patient not taking: Reported on 6/10/2025), Disp: 30 tablet, Rfl: 5

## 2025-06-12 NOTE — Clinical Note
6/12/25 @1046    Phone Number Called: [phone]    Call outcome: Left detailed message for patient. Informed to call back with any additional questions.    Message: I called mom back and left her a voicemail letting her know 7/22/25 is already booked. I left her a voicemail yesterday letting her know 2 other dates Dr. Gaspar was able to offer to her; 7/10 or 8/12. I requested a call back if she wants to reschedule to one of those dates.   Patient was discharged to Holding area/[facility].

## 2025-06-16 ENCOUNTER — TELEPHONE (OUTPATIENT)
Dept: RADIATION ONCOLOGY | Facility: CLINIC | Age: 70
End: 2025-06-16
Payer: MEDICARE

## 2025-06-20 ENCOUNTER — NUTRITION (OUTPATIENT)
Dept: HEMATOLOGY/ONCOLOGY | Facility: CLINIC | Age: 70
End: 2025-06-20
Payer: MEDICARE

## 2025-06-20 VITALS — HEIGHT: 62 IN | BODY MASS INDEX: 14.93 KG/M2

## 2025-06-20 NOTE — PROGRESS NOTES
NUTRITION Virtual Visit Follow-up NOTE    Nutrition Assessment     Reason for Visit:  Ingris Mckinney is a 69 y.o. female who presents for primary SCC supraglottis  Pt in for radiation consult, asked to see 2/2 dx, weight status, weight loss    TX: Cisplatin with concurrent radiation - RT completed 5/6    Patient lives in [place] alone, Zulema, sister, lives in [place], other sister in [facility]. Sisters help pt with buying groceries etc    Contact Zulema Mckinney [phone]; asked pt today as she said she cannot talk long due to difficulty talking; pt reported that she does not want me to call her sister and prefers all contact to go through her.     Her insurance company provides transportation    PEG placed 4/3/25  16 F legacy tube    Phoned pt today for follow-up. Pt answered HIPAA compliant. Pt was only able to speak on phone for a limited time due to difficulty talking.   Virtual or Telephone Consent  While technically available, the patient was unable or unwilling to consent to connect via audio/video telehealth technology; therefore, I performed this visit using a real-time audio only connection between Ingris Mckinney & Vanessa Plata RD, LD.  Verbal consent was requested and obtained from Ingris Mckinney on this date, 06/20/25 for a telehealth visit and the patient's location was confirmed at the time of the visit.    Problem List[1]    Nutrition Significant labs:  Lab Results   Component Value Date/Time    GLUCOSE 114 (H) 06/10/2025 0814     06/10/2025 0814    K 3.4 (L) 06/10/2025 0814    CL 97 (L) 06/10/2025 0814    CO2 34 (H) 06/10/2025 0814    ANIONGAP 12 06/10/2025 0814    BUN 7 06/10/2025 0814    CREATININE 0.49 (L) 06/10/2025 0814    EGFR >90 06/10/2025 0814    CALCIUM 9.6 06/10/2025 0814    ALBUMIN 4.1 06/10/2025 0814    ALKPHOS 36 06/10/2025 0814    PROT 6.8 06/10/2025 0814    AST 15 06/10/2025 0814    BILITOT 0.6 06/10/2025 0814    ALT 6 (L) 06/10/2025 0814    MG 1.70 06/10/2025 0814     No  "results found for: \"VITD25\"        Anthropometrics:  Height: 157 cm (5' 1.81\")     36.8kg        IBW/kg (Dietitian Calculated): 47.7 kg Percent of IBW: 77 %          Weight History:   Wt Readings from Last 15 Encounters:   06/10/25 (!) 36.8 kg (81 lb 3.8 oz)   06/10/25 (!) 36.8 kg (81 lb 2.1 oz)   05/06/25 (!) 40.3 kg (88 lb 13.5 oz)   05/01/25 (!) 41.6 kg (91 lb 9.6 oz)   05/01/25 (!) 41.6 kg (91 lb 11.4 oz)   04/28/25 (!) 39.8 kg (87 lb 13.7 oz)   04/28/25 (!) 39.8 kg (87 lb 13.7 oz)   04/24/25 (!) 43.6 kg (96 lb 0.2 oz)   04/24/25 (!) 42.5 kg (93 lb 12.9 oz)   04/21/25 (!) 41.7 kg (91 lb 14.9 oz)   04/21/25 (!) 41.7 kg (91 lb 14.9 oz)   04/17/25 (!) 42.5 kg (93 lb 11.1 oz)   04/17/25 (!) 42 kg (92 lb 7.7 oz)   04/14/25 (!) 41.3 kg (90 lb 15 oz)   04/14/25 (!) 41.3 kg (90 lb 15 oz)     Weight Change %:  Weight History / % Weight Change: weight loss in the last month of 8.6%; per chart review, last UBW reported of 90-100lbs  Significant Weight Loss: Yes  Interpretation of Weight Loss: >5% in 1 month    Nutrition History:  Food and Nutrient History  Food and Nutrient History: Pt reports being able to do some softer foods by mouth and is trying to eat multiple times a day as she is able. Pt is doing some frozen meals, sometimes able to finish all of it. Pt reports that she is trying to get in 2 Boost VHC daily. Confirms she is not using her tube but is flushing it at least daily with 60mL water  Energy Intake: Poor < 50 %, Fair 50-75 %    Food Intake  Amount of Food: on 6/10 pt reports to other providers that she has been \"lazy\" and not eating.  Fluid Intake: water    Food Supplement Intake  Oral Nutrition Supplements: Boost Very High Calorie  Frequency: back to 2/day  Protein: 22 each  Calories: 530 each         Current Medications[2]     Nutrition Focused Physical Exam Findings:  On 5/6/25 pt was noted severe muscle and adipose losses   Subcutaneous Fat Loss  Defer Subcutaneous Fat Loss Assessment: Defer all  Defer " All Reason: phone visit         Physical Findings  Digestive System Findings:  (denies)  Mouth Findings: Dysgeusia (taste is improving)         Estimated Needs:  Weight Used for Equation Calculations: 36.8 kg (81 lb 2.1 oz)    Estimated Energy Needs  Total Energy Estimated Needs in 24 hours (kCal): 1288 kCal  Energy Estimated Needs per kg Body Weight in 24 hours (kCal/kg): 35 kCal/kg  Method for Estimating Needs: up to 1472kcal/day  Estimated Protein Needs  Total Protein Estimated Needs in 24 Hours (g): 55 g  Protein Estimated Needs per kg Body Weight in 24 Hours (g/kg): 1.5 g/kg  Estimated Fluid Needs  Total Fluid Estimated Needs in 24 Hours (mL): 1288 mL  Method for Estimating 24 Hour Fluid Needs: up to 1472mL/day (1mL/kcal)             Nutrition Diagnosis   Malnutrition Diagnosis  Patient has Malnutrition Diagnosis: Yes  Diagnosis Status: Active  Malnutrition Diagnosis: Severe malnutrition related to chronic disease or condition  Related to: unintentional weight loss in setting of increased metabolic demand  As Evidenced by: 9% weight loss over 1 month, poor oral intake, findings on NFPE (severe depletion fat and muscle stores)    Nutrition Diagnosis  Patient has Nutrition Diagnosis: Yes  Diagnosis Status (1): Active  Nutrition Diagnosis 1: Predicted inadequate energy intake  Related to (1): pathophsyiology of disease and treatment  As Evidenced by (1): anticipated nutrition impact symptoms affecting oral intake and weight  Additional Nutrition Diagnosis: Diagnosis 3  Diagnosis Status (2): Active  Nutrition Diagnosis 2: Increased energy expenditure  Related to (2): increased metabolic demand, disease process  As Evidenced by (2): cancer diagnosis, planned treatment  Diagnosis Status (3): Active  Nutrition Diagnosis 3: Disordered eating pattern  Related to (3): patients meal habits  As Evidenced by (3): pt not eating, caring for self, will not prepare food  Additional Assessment Information (3): Pt is likely not  eating or consuming ONS per noted weight loss.       Nutrition Interventions/Recommendations   Nutrition Prescription: Individualized Nutrition Prescription Provided for : Oral nutrition, Enteral nutrition     Recommendations: Individualized Nutrition Prescription Provided for : soft/puree/liquid diet, high calorie high protein, management of NIS, enteral nutrition as needed    Nutrition Interventions:   Food and Nutrient Delivery: Food and Nutrition Delivery  Meals & Snacks: Energy-modified diet, Fluid-modified diet, Modification of schedule of oral intake, Protein-modified diet, Texture-Modified Diet  Goals: Reviewed importance of taking Boost VHC 2/day everyday + frozen meal. If unable to consume frozen meal- recommend trial of 2.5-3 Boost VHC daily.  Enteral Nutrition: Other, specify:  Goals: Pt would benefit from using PEG due to severe malnutrition and inadequate oral intake. However, pt declined using PEG at this time, she doesn't wish to use it yet.  Medical Food Supplement: Commercial beverage medical food supplement therapy  Goals: Continue Boost VHC BID consistently, daily; encouraged to trial of 2.5-3/day; 1325-1590kcal, 55-66g ptn - this would provide 100% of pt needs  Other:: fluid  Goals: use PEG for hydration, fluid as needed     Coordination of Care:       Nutrition Education:   Nutrition Education Content: Nutrition Education Content: Content related nutrition education  Goals: Encouraged pt to try and eat more frequently; pt was having difficulty talking so not able to talk long.   Handouts provided/ reviewed:  PEG guide provided previously   Called [facility] for clarification on orders- report pt has not re-ordered Boost since March. Per chart review, RDN has offered to call in the past and reorder for pt and pt has declined. Pt is likely not consuming especially with continued weight loss noted. Should pt agree to using PEG, would recommend going slow due to risked for refeeding and  supplementation of 100mg Thiamine daily.   DME: [facility]- current order for Boost VHC BID; order still active and pt can reorder at any time.  Previous enteral order on hold as pt does not wish to use PEG; Isosource 1.5 3.5 cartons per day to provide 1312 calories, 60 grams protein 669 ml free water     Readiness to Change : limited  Level of Understanding : unable to assess due to limited time with pt  Anticipated Compliant : as able/tolerated/ within patients abilities         Nutrition Monitoring and Evaluation   Food and Nutrient Intake  Monitoring and Evaluation Plan: Energy intake, Meal/snack pattern, Protein intake, Fluid intake, Amount of food  Energy Intake: Estimated energy intake  Criteria: Meet >75% estimated energy needs- food recall  Fluid Intake: Estimated fluid intake  Criteria: maintain hydration  Amount of Food: Medical food intake  Criteria: ONS as recommeded  Meal/Snack Pattern: Estimated meal and snack pattern  Criteria: 2-4 small meals/snacks per day  Estimated protein intake: Estimated protein intake  Criteria: meet >75% estimated protein needs - food recall    Anthropometric measurements  Monitoring and Evaluation Plan: Weight  Body Weight: Measured body weight  Criteria: maintain/gain weight         Nutrition Focused Physical Findings  Monitoring and Evaluation Plan: Adipose, Muscle, Digestive System  Adipose Finding: Loss of subcutaneous fat  Criteria: prevent further losses  Digestive System Finding: Constipation  Criteria: manage symptoms, adhere to regime as needed (Denies constipation at this time)  Muscle Finding: Muscle atrophy  Criteria: prevent further losses    Follow Up: as needed     Time Spent  Prep time on day of patient encounter: 5 minutes  Time spent directly with patient, family or caregiver: 10 minutes  Additional Time Spent on Patient Care Activities: 5 minutes  Documentation Time: 20 minutes  Other Time Spent: 0 minutes  Total: 40 minutes                [1]   Patient  Active Problem List  Diagnosis    Acute on chronic respiratory failure with hypoxia and hypercapnia    Anxiety    Balance disorder    Blood loss anemia    Closed fracture of pelvis (Multi)    Closed fracture of right inferior pubic ramus    Degenerative joint disease (DJD) of hip    Depression    Essential hypertension    ETOH abuse    Hyperlipidemia    Hypoxemia    Centrilobular emphysema (Multi)    COPD with exacerbation (Multi)    Severe protein-calorie malnutrition (Multi)    Lesion of larynx    Primary squamous cell carcinoma of supraglottis   [2]   Current Outpatient Medications:     albuterol 2.5 mg /3 mL (0.083 %) nebulizer solution, Take 3 mL (2.5 mg) by nebulization every 4 hours if needed for wheezing., Disp: , Rfl:     albuterol 90 mcg/actuation inhaler, Inhale 2 puffs every 4 hours if needed., Disp: , Rfl:     alendronate (Fosamax) 70 mg tablet, Take 1 tablet (70 mg) by mouth every 7 days. Take in the morning with a full glass of water, on an empty stomach, and do not take anything else by mouth or lie down for the next 30 min., Disp: 12 tablet, Rfl: 3    dexAMETHasone (Decadron) 4 mg tablet, Take 2 tablets (8 mg) by mouth once daily. For 3 days per week starting the day after treatment., Disp: 42 tablet, Rfl: 0    Dulera 200-5 mcg/actuation inhaler, Inhale 2 puffs twice a day., Disp: , Rfl:     OLANZapine (ZyPREXA) 5 mg tablet, Take 1 tablet (5 mg) by mouth once daily at bedtime., Disp: 30 tablet, Rfl: 1    ondansetron (Zofran) 8 mg tablet, Take 1 tablet (8 mg) by mouth every 8 hours if needed for nausea or vomiting. (Patient not taking: Reported on 6/10/2025), Disp: 30 tablet, Rfl: 5    prochlorperazine (Compazine) 10 mg tablet, Take 1 tablet (10 mg) by mouth every 6 hours if needed for nausea or vomiting. (Patient not taking: Reported on 6/10/2025), Disp: 30 tablet, Rfl: 5    rosuvastatin (Crestor) 10 mg tablet, Take 1 tablet (10 mg) by mouth once daily., Disp: 90 tablet, Rfl: 2    umeclidinium  (Incruse Ellipta) 62.5 mcg/actuation inhalation, Inhale 1 puff (62.5 mcg) once daily., Disp: , Rfl:

## 2025-07-01 ENCOUNTER — TELEPHONE (OUTPATIENT)
Dept: HEMATOLOGY/ONCOLOGY | Facility: CLINIC | Age: 70
End: 2025-07-01

## 2025-07-01 ENCOUNTER — TELEPHONE (OUTPATIENT)
Dept: HEMATOLOGY/ONCOLOGY | Facility: CLINIC | Age: 70
End: 2025-07-01
Payer: MEDICARE

## 2025-07-01 NOTE — TELEPHONE ENCOUNTER
Reason for Conversation  tube issues    Background   Patient called and spoke to Junior in [facility]. Junior asked I call patient because she has having stomach issues. Spoke to patient she states the tube in her stomach has fallen out. She states she isn't bleeding but doesn't know what to do. Pls advise Pt can be reached at [phone]    Disposition   No disposition on file.

## 2025-07-01 NOTE — TELEPHONE ENCOUNTER
"Spoke to Ingris.  Her feeding tube \"fell out\".. She has never used it.  Per Michael Fregoso CNP clean site and keep it dry and apply clean gauze dressing .  Monitor for fever, redness or drainage.  Should heal up in next few days.  Ingris did a teachback.  "

## 2025-07-07 ENCOUNTER — APPOINTMENT (OUTPATIENT)
Dept: HEMATOLOGY/ONCOLOGY | Facility: CLINIC | Age: 70
End: 2025-07-07
Payer: MEDICARE

## 2025-07-10 ENCOUNTER — OFFICE VISIT (OUTPATIENT)
Dept: HEMATOLOGY/ONCOLOGY | Facility: CLINIC | Age: 70
End: 2025-07-10
Payer: MEDICARE

## 2025-07-10 VITALS
RESPIRATION RATE: 18 BRPM | SYSTOLIC BLOOD PRESSURE: 112 MMHG | OXYGEN SATURATION: 98 % | WEIGHT: 83.33 LBS | DIASTOLIC BLOOD PRESSURE: 67 MMHG | HEART RATE: 88 BPM | BODY MASS INDEX: 15.34 KG/M2 | TEMPERATURE: 97.3 F

## 2025-07-10 DIAGNOSIS — C32.1 PRIMARY SQUAMOUS CELL CARCINOMA OF SUPRAGLOTTIS: ICD-10-CM

## 2025-07-10 PROCEDURE — 1160F RVW MEDS BY RX/DR IN RCRD: CPT | Performed by: NURSE PRACTITIONER

## 2025-07-10 PROCEDURE — 3078F DIAST BP <80 MM HG: CPT | Performed by: NURSE PRACTITIONER

## 2025-07-10 PROCEDURE — 3074F SYST BP LT 130 MM HG: CPT | Performed by: NURSE PRACTITIONER

## 2025-07-10 PROCEDURE — 99213 OFFICE O/P EST LOW 20 MIN: CPT | Performed by: NURSE PRACTITIONER

## 2025-07-10 PROCEDURE — 1159F MED LIST DOCD IN RCRD: CPT | Performed by: NURSE PRACTITIONER

## 2025-07-10 PROCEDURE — 1126F AMNT PAIN NOTED NONE PRSNT: CPT | Performed by: NURSE PRACTITIONER

## 2025-07-10 ASSESSMENT — PAIN SCALES - GENERAL: PAINLEVEL_OUTOF10: 0-NO PAIN

## 2025-07-10 NOTE — PROGRESS NOTES
Patient here for follow up visit squamous cell superglottis, completed concurrent tx with cisplatin 2 months ago. Peg tube fell out per patient. She was not using it much.   Denies N/V/D/C.  Taste is improving but low appetite due to the heat.    Patient here alone.   Arrives without her oxygen today and doing fairly well, continues with some SOB with excersion D/T COPD    Medications and Allergies reviewed and reconciled this visit.    Follow up per MD request.    Patient reports availability and use of mychart, Reviewed this is a good place to communicate with the team as well as review labs and upcoming orders.      No barriers to education noted, patient agrees to current plan and verbalized understanding using teach back method.

## 2025-07-10 NOTE — PROGRESS NOTES
Subjective:   Patient Name: Ingris Mckinney    : 1955     MRN: [id number]     Age: 69 y.o.     Gender: female  Referring Provider: ARNAV    CC: Management of newly diagnosed L sided supraglottic squamous cell carcinoma ([id number])    Presenting History (3/13/2025): At time of initial presentation a 69 y.o. female, current smoker (started at age 19,1/3 pack per day) with a past medical history of osteoporosis, HLD, COPD (occasionally on O2) referred for management of suprglottic cancer.      She has been following with ENT for history of vocal cord paralysis without CT abnormality since , which was successfully managed with injection medialization.     She then developed acute worsening of her voice in 2024, with scope exam shortly after that showed mucosal irregularity along the anterior left false cord. The left true vocal cord was fixed in the paramedian position. The right true vocal cord exhibited normal movement. She was taken to the operating room for direct and biopsy on 2025. Operative findings showed a left false cord mass lesion which was not obstructive, and extended from anterior to posterior. The mass extended to the laryngeal ventricle on the left, but spared the true vocal cord. The mass did not appear to cross to the right. The vallecula, base of tongue and hypopharynx were normal. Biopsies from the left supraglottic mass were positive for invasive moderately differentiated squamous cell carcinoma.     CT neck on 2025 showed an enhancing mass in the left supraglottic larynx, 1.0 x 1.7 x 1.4 cm in size, which approached the epiglottis without definitive invasion. There was an area of irregularity/thickening on the right true vocal cord of unclear etiology.     2025: PET/CT: 1.  Focal intense hypermetabolic activity at the left aryepiglottic fold compatible with biopsy-proven squamous cell carcinoma. 2. No evidence of hypermetabolic kevin or distant metastatic disease.3.  "Mild hypermetabolic activity associated with the right upper lobe pulmonary nodule. Continued attention on follow-up chest CT is recommended.    She was seen by ENT Dr. Lo and unfortunately was deemed not surgically resectable. She presented to [facility] for a consultation accompanied by her brother in law. She lives on her own. She states that she has been lazy over the past months and has lost a lot of weight. She doesn't really eat much food other than cereals. Luckily her sister and in-law brought her dinner. She has gained 12 lb in the past weeks. She denies any dysphagia or odynophagia. NO pain. NO numbness or tingling. No heating issues. No fever or chills. No n/v/d/c    Social Hx:   No etoh or illicit drugs.   2 sisters   No kids      Treatment Summary:  - March 18, 2025 C1D1 Cisplatin - weekly with concurrent RT     Current Treatment:  - March 18, 2025 C1D1 Cisplatin 40mg/m2 IV - weekly with concurrent RT   - March 25, 2025 C1D8 Cisplatin 40mg/m2 IV  - March 31, 2025 C1D15 Cisplatin 40mg/m2 IV  - April 7, 2025 C1D22 Cisplatin 40mg/m2 IV  - April 14, 2025 C1D29 Cisplatin 40mg/m2 IV  - April 21, 2025 C1D36 Cisplatin 40mg/m2 IV  - April 28, 2025 C1D43 Cisplatin 40mg/m2 IV   - May 6, 2025 final RT    Interval History (06/10/2025):    Patient presents today for follow-up evaluation. She is alone at today's visit. She completed her 7th and final dose of Cisplatin on April 28 and final radiation treatment on May 6, 2025. Aside from weight loss she tolerated treatment very well. She is noted to have continued loss of weight since competition of treatment. She reports experiencing worsening dysphagia upon completion of treatment. She reports this has improved over the past week. She was not utilizing the PEG tube. She report \"normal\" weight for her is 90-100lbs.     7/10/2025  Returns for routine follow-up evaluation. Denies any throat pain today. Denies dysphagia. No xerostomia. Dysgeusia improving. No nausea or " vomiting. She denies any fevers, chills or night sweats. + cough chronic COPD, no chest pain, JONES interferes with ADLs. On home oxygen has portable oxygen concentrator.  She reports fatigue. Denies constipation or diarrhea. No urinary symptoms. No rash. No neuropathy. Denies hearing changes or tinnitus. No further facial swelling.      G-tube placed 4/3/2025 - 7/1/2025 (feel out)    Review of Systems:  A review of systems has been completed and are negative for complaints except what is stated in the HPI and/or past medical history      Medical History:   Past Medical History:   Diagnosis Date    Anxiety     At risk for falling     COPD (chronic obstructive pulmonary disease) (Multi)     Hypercholesteremia     Hypoxia     Larynx cancer (Multi)     Malnutrition (Multi)     Personal history of other diseases of the respiratory system     History of chronic obstructive lung disease    Respiratory failure (Multi)     Tachycardia     Weakness        Surgical History:   Past Surgical History:   Procedure Laterality Date    CT GUIDED IMAGING FOR NEEDLE PLACEMENT  05/05/2020    CT GUIDED IMAGING FOR NEEDLE PLACEMENT [facility]    LARYNGOSCOPY      direct w/ bx    OTHER SURGICAL HISTORY  11/11/2022    Hip surgery    OTHER SURGICAL HISTORY  11/11/2022    Arm surgery    OTHER SURGICAL HISTORY  11/11/2022    Leg surgery       Family History:  Family History   Problem Relation Name Age of Onset    Other (chronic lung disease) Other         Allergies:  Allergies   Allergen Reactions    Scallops Anaphylaxis    Iodinated Contrast Media Itching       Meds (Current):  Current Medications[1]        Pain Assessment:  Pain is: 0    Performance Status (ECOG): 2  ECOG Definition  0 Fully active; no performance restrictions.  1 Strenuous physical activity restricted; fully ambulatory and able to carry out light work.  2 Capable of all self-care but unable to carry out any work activities. Up and about >50% of waking  hours.  3 Capable of only limited self-care; confined to bed or chair >50% of waking hours.  4 Completely disabled; cannot carry out any self-care; totally confined to bed or chair.  Exam:    Vital Signs:  /67 (BP Location: Right arm, Patient Position: Sitting, BP Cuff Size: Small adult)   Pulse 88   Temp 36.3 °C (97.3 °F) (Temporal)   Resp 18   Wt (!) 37.8 kg (83 lb 5.3 oz)   SpO2 98%   BMI 15.34 kg/m²     Wt Readings from Last 5 Encounters:   07/10/25 (!) 37.8 kg (83 lb 5.3 oz)   06/10/25 (!) 36.8 kg (81 lb 3.8 oz)   06/10/25 (!) 36.8 kg (81 lb 2.1 oz)   25 (!) 40.3 kg (88 lb 13.5 oz)   25 (!) 41.6 kg (91 lb 9.6 oz)       Physical Exam:  ECO  Pain: 0  Constitutional: Thin appearing woman. awake/alert/oriented x3, no distress, alert and cooperative  Eyes: PER. sclera anicteric  ENMT: Oral mucosa moist, no lesions or thrush identified  Respiratory/Thorax: Breathing is non-labored. Lungs are clear to auscultation bilaterally. No adventitious breath sounds  Cardiovascular: S1-S2. Regular rate and rhythm. No murmurs, rubs, or gallops appreciated  Gastrointestinal: Abdomen soft nontender, nondistended, normal active bowel sounds. G-tube in place   Musculoskeletal: ROM intact, no joint swelling, normal strength  Extremities: normal extremities, no cyanosis, no edema, no clubbing  Lymphatics: no palpable lymphadenopathy   Skin: no rash  Neurologic: alert and oriented x3. Nonfocal exam. No myoclonus  Psychological: Pleasant, appropriate and easily engaged     Labs:  Lab Results   Component Value Date    WBC 3.1 (L) 06/10/2025    HGB 9.4 (L) 06/10/2025    HCT 28.9 (L) 06/10/2025     (H) 06/10/2025     06/10/2025      Lab Results   Component Value Date    NEUTROABS 2.06 06/10/2025      Lab Results   Component Value Date    GLUCOSE 114 (H) 06/10/2025    CALCIUM 9.6 06/10/2025     06/10/2025    K 3.4 (L) 06/10/2025    CO2 34 (H) 06/10/2025    CL 97 (L) 06/10/2025    BUN 7  06/10/2025    CREATININE 0.49 (L) 06/10/2025    MG 1.70 06/10/2025     Lab Results   Component Value Date    ALT 6 (L) 06/10/2025    AST 15 06/10/2025    ALKPHOS 36 06/10/2025    BILITOT 0.6 06/10/2025      Lab Results   Component Value Date    TSH 4.09 (H) 2025    FREET4 1.06 2025           Imaging:  No new imaging        Assessment/Plan:   69 y.o. female with past medical history as stated referred for management of supraglottic squamous cell carcinoma.    The patient's records and imaging have been reviewed in detail.  I have discussed with the patient the natural history of their disease at length.  The following has also been discussed with the patient:    # Cancer:   L sided supraglottic squamous cell carcinoma ([id number]). Given that she is not surgically resectable. I thus recommend treatment of weekly cisplatin with concurrent RT for 7 weeks. Side effects of chenotherapy including but not limited to nausea, vomiting, diarrhea, anemia, thrombocytopenia,  leukopenia, neutropenic fever, kidney toxicities, liver toxicities, rash, infusion related reaction, secondary malignancies MDS or AML, and fertility impact, neuropathy and hearing impact. Consent signed. To start next week.     - Interval Imagin2025: PET/CT: 1.  Focal intense hypermetabolic activity at the left aryepiglottic fold compatible with biopsy-proven squamous cell carcinoma. 2. No evidence of hypermetabolic kevin or distant metastatic disease.3. Mild hypermetabolic activity associated with the right upper lobe pulmonary nodule. Continued attention on follow-up chest CT is recommended.    # Pulmonary nodule: will continue to follow.    # Clinical Trials:  None currently.     # Access: Peripheral veins.      # Pain: No acute pain.    # Bone Health: No signs of bony disease.     # Psychosocial: Coping well, no acute issues.  Good support from family & friends.  Social work support offered.  **OKAY to discuss treatment plans with  sister.     # Hearing: NO baseline hearing issues.    # Speech and swallow: Order for G-tube placement   Has been sent to GI (will not put PEG in), General Surgery (consult appt April 8) and [facility] (no longer places PEGs)  Call out to IR at [facility] and social work will assist in transportation.   4/3/2025: G-tube was placed at [facility]  4/14/2025: Weight loss identified this week. Increased dysphagia and pain. Will need to start tube feeds. Soft food for pleasure.  4/21/2025: 2 Boost/Ensure daily. Encouraged use of tube feed.   4/28/2025: Weight loss identified over past week. Encouraged tube feed. Continue to follow with dietitian.   5/6/2025: continue to monitor weight. Malnourished. Pt verbalizes her baseline is    6/10/2025: continued wt loss. Not using PEG. Agrees to food for pleasure and Boost starting 2 per day via PEG  7/10/2025: PEG has fallen out. Continue to monitor weight    # GI/CINV: severe malnutrition. Nutrition following.    # Smoking: N/A, current smoker, working on quitting.    # Fertility: N/A.  Oncofertility support available as needed.      # Heme/ESAs: N/A.    #JONES: Has home oxygen ([facility], [place]). Portable concentrator ordered. Oxygen 93% RA at rest. 88% walking short distance with assistance. 97% at rest on 2L via NC.     # GOC: Patient is aware of curative intent goals of care.    # Language: English.    # Interdisciplinary Patient/Family Education Record:  Learner:  Patient.  Learning Needs Reviewed:  Treatments, Medications, Disease Process, Diagnostic Tests.  Barriers: None.  Teaching Method: Discussion, Handout(s).  Comprehension:  Verbalized Understanding.  Follow-up Plan:  Return to office as per MD.    # Dispo:   RTC 3 months      Michael Fregoso, APRN-CNP  [facility]  Thoracic + H&N Medical Oncology     I spent a total of 30+ minutes on the date of the service which included preparing to see the patient, face-to-face patient care, completing clinical  documentation, obtaining and / or reviewing separately obtained history, counseling and educating the patient/family/caregiver, ordering medications, tests, or procedures, communicating with other healthcare providers (not separately reported), independently interpreting results, not separately reported, and communicating results to the patient/family/caregiver, Name and date of birth verified.                    [1]   Current Outpatient Medications:     albuterol 2.5 mg /3 mL (0.083 %) nebulizer solution, Take 3 mL (2.5 mg) by nebulization every 4 hours if needed for wheezing., Disp: , Rfl:     albuterol 90 mcg/actuation inhaler, Inhale 2 puffs every 4 hours if needed., Disp: , Rfl:     alendronate (Fosamax) 70 mg tablet, Take 1 tablet (70 mg) by mouth every 7 days. Take in the morning with a full glass of water, on an empty stomach, and do not take anything else by mouth or lie down for the next 30 min., Disp: 12 tablet, Rfl: 3    Dulera 200-5 mcg/actuation inhaler, Inhale 2 puffs twice a day., Disp: , Rfl:     umeclidinium (Incruse Ellipta) 62.5 mcg/actuation inhalation, Inhale 1 puff (62.5 mcg) once daily., Disp: , Rfl:     rosuvastatin (Crestor) 10 mg tablet, Take 1 tablet (10 mg) by mouth once daily. (Patient not taking: Reported on 7/10/2025), Disp: 90 tablet, Rfl: 2     no

## 2025-08-11 ENCOUNTER — HOSPITAL ENCOUNTER (OUTPATIENT)
Dept: RADIOLOGY | Facility: CLINIC | Age: 70
Discharge: HOME | End: 2025-08-11
Payer: MEDICARE

## 2025-08-11 ENCOUNTER — HOSPITAL ENCOUNTER (OUTPATIENT)
Dept: RADIATION ONCOLOGY | Facility: CLINIC | Age: 70
Setting detail: RADIATION/ONCOLOGY SERIES
Discharge: HOME | End: 2025-08-11
Payer: MEDICARE

## 2025-08-11 ENCOUNTER — NUTRITION (OUTPATIENT)
Dept: HEMATOLOGY/ONCOLOGY | Facility: CLINIC | Age: 70
End: 2025-08-11

## 2025-08-11 VITALS
WEIGHT: 80.25 LBS | OXYGEN SATURATION: 92 % | SYSTOLIC BLOOD PRESSURE: 114 MMHG | BODY MASS INDEX: 14.77 KG/M2 | RESPIRATION RATE: 18 BRPM | DIASTOLIC BLOOD PRESSURE: 67 MMHG | HEART RATE: 99 BPM | TEMPERATURE: 97.2 F

## 2025-08-11 VITALS — WEIGHT: 80.25 LBS | HEIGHT: 62 IN | BODY MASS INDEX: 14.77 KG/M2

## 2025-08-11 DIAGNOSIS — R91.8 MULTIPLE LUNG NODULES: ICD-10-CM

## 2025-08-11 DIAGNOSIS — C32.1 PRIMARY SQUAMOUS CELL CARCINOMA OF SUPRAGLOTTIS: ICD-10-CM

## 2025-08-11 DIAGNOSIS — C32.1 PRIMARY SQUAMOUS CELL CARCINOMA OF SUPRAGLOTTIS: Primary | ICD-10-CM

## 2025-08-11 PROCEDURE — 78815 PET IMAGE W/CT SKULL-THIGH: CPT | Performed by: INTERNAL MEDICINE

## 2025-08-11 PROCEDURE — 31575 DIAGNOSTIC LARYNGOSCOPY: CPT | Performed by: STUDENT IN AN ORGANIZED HEALTH CARE EDUCATION/TRAINING PROGRAM

## 2025-08-11 PROCEDURE — 99213 OFFICE O/P EST LOW 20 MIN: CPT | Mod: 25 | Performed by: STUDENT IN AN ORGANIZED HEALTH CARE EDUCATION/TRAINING PROGRAM

## 2025-08-11 PROCEDURE — A9552 F18 FDG: HCPCS | Performed by: STUDENT IN AN ORGANIZED HEALTH CARE EDUCATION/TRAINING PROGRAM

## 2025-08-11 PROCEDURE — 2500000004 HC RX 250 GENERAL PHARMACY W/ HCPCS (ALT 636 FOR OP/ED): Performed by: STUDENT IN AN ORGANIZED HEALTH CARE EDUCATION/TRAINING PROGRAM

## 2025-08-11 PROCEDURE — 2500000005 HC RX 250 GENERAL PHARMACY W/O HCPCS: Performed by: STUDENT IN AN ORGANIZED HEALTH CARE EDUCATION/TRAINING PROGRAM

## 2025-08-11 PROCEDURE — 3430000001 HC RX 343 DIAGNOSTIC RADIOPHARMACEUTICALS: Performed by: STUDENT IN AN ORGANIZED HEALTH CARE EDUCATION/TRAINING PROGRAM

## 2025-08-11 PROCEDURE — 78815 PET IMAGE W/CT SKULL-THIGH: CPT

## 2025-08-11 RX ORDER — FLUDEOXYGLUCOSE F 18 200 MCI/ML
10.2 INJECTION, SOLUTION INTRAVENOUS
Status: COMPLETED | OUTPATIENT
Start: 2025-08-11 | End: 2025-08-11

## 2025-08-11 RX ORDER — LIDOCAINE HYDROCHLORIDE 20 MG/ML
2 INJECTION, SOLUTION EPIDURAL; INFILTRATION; INTRACAUDAL; PERINEURAL ONCE
Status: COMPLETED | OUTPATIENT
Start: 2025-08-11 | End: 2025-08-11

## 2025-08-11 RX ADMIN — PHENYLEPHRINE HYDROCHLORIDE 2 SPRAY: 0.5 SPRAY NASAL at 12:49

## 2025-08-11 RX ADMIN — LIDOCAINE HYDROCHLORIDE 40 MG: 20 INJECTION, SOLUTION EPIDURAL; INFILTRATION; INTRACAUDAL; PERINEURAL at 12:49

## 2025-08-11 RX ADMIN — FLUDEOXYGLUCOSE F 18 10.2 MILLICURIE: 200 INJECTION, SOLUTION INTRAVENOUS at 10:29

## 2025-08-11 ASSESSMENT — PAIN SCALES - GENERAL: PAINLEVEL_OUTOF10: 0-NO PAIN

## 2025-08-15 ENCOUNTER — APPOINTMENT (OUTPATIENT)
Dept: RADIATION ONCOLOGY | Facility: CLINIC | Age: 70
End: 2025-08-15
Payer: MEDICARE

## 2025-09-19 ENCOUNTER — APPOINTMENT (OUTPATIENT)
Dept: AUDIOLOGY | Facility: CLINIC | Age: 70
End: 2025-09-19
Payer: MEDICARE

## 2025-09-19 ENCOUNTER — APPOINTMENT (OUTPATIENT)
Dept: OTOLARYNGOLOGY | Facility: CLINIC | Age: 70
End: 2025-09-19
Payer: MEDICARE

## (undated) DEVICE — SLEEVE, VASO PRESS, CALF GARMENT, MEDIUM, GREEN

## (undated) DEVICE — GLOVE, SURGICAL, PROTEXIS PI , 7.5, PF, LF

## (undated) DEVICE — Device

## (undated) DEVICE — DRESSING, NON-ADHERENT, CURAD, ABSORBENT, 3 X 8 IN, STERILE

## (undated) DEVICE — SOLUTION, IRRIGATION, X RX SODIUM CHL 0.9%, 1000ML BTL

## (undated) DEVICE — PROTECTOR, TOOTH, ADULT

## (undated) DEVICE — DRAPE, TAPE, ORTHO